# Patient Record
Sex: FEMALE | Race: WHITE | NOT HISPANIC OR LATINO | Employment: OTHER | URBAN - METROPOLITAN AREA
[De-identification: names, ages, dates, MRNs, and addresses within clinical notes are randomized per-mention and may not be internally consistent; named-entity substitution may affect disease eponyms.]

---

## 2017-01-02 ENCOUNTER — HOSPITAL ENCOUNTER (OUTPATIENT)
Dept: RADIOLOGY | Age: 72
Discharge: HOME | End: 2017-01-02
Payer: MEDICARE

## 2017-01-02 ENCOUNTER — TRANSCRIBE ORDERS (OUTPATIENT)
Dept: ADMINISTRATIVE | Age: 72
End: 2017-01-02

## 2017-01-02 DIAGNOSIS — J11.00 PNEUMONIA OF RIGHT MIDDLE LOBE DUE TO INFLUENZA A VIRUS: Primary | ICD-10-CM

## 2017-01-02 DIAGNOSIS — J11.00 PNEUMONIA OF RIGHT MIDDLE LOBE DUE TO INFLUENZA A VIRUS: ICD-10-CM

## 2017-01-02 PROCEDURE — 71020 HB CHEST X-RAY 2VW FRONTAL&LATL: CPT

## 2017-03-17 ENCOUNTER — ALLSCRIPTS OFFICE VISIT (OUTPATIENT)
Dept: OTHER | Facility: OTHER | Age: 72
End: 2017-03-17

## 2017-03-17 DIAGNOSIS — E04.1 NONTOXIC SINGLE THYROID NODULE: ICD-10-CM

## 2017-03-17 DIAGNOSIS — E78.5 HYPERLIPIDEMIA: ICD-10-CM

## 2017-03-17 DIAGNOSIS — E11.65 TYPE 2 DIABETES MELLITUS WITH HYPERGLYCEMIA (HCC): ICD-10-CM

## 2017-03-23 ENCOUNTER — TRANSCRIBE ORDERS (OUTPATIENT)
Dept: ADMINISTRATIVE | Facility: HOSPITAL | Age: 72
End: 2017-03-23

## 2017-03-23 DIAGNOSIS — D75.89 ERYTHRODYSPLASIA: Primary | ICD-10-CM

## 2017-03-24 ENCOUNTER — APPOINTMENT (OUTPATIENT)
Dept: LAB | Age: 72
End: 2017-03-24
Payer: MEDICARE

## 2017-03-24 ENCOUNTER — TRANSCRIBE ORDERS (OUTPATIENT)
Dept: ADMINISTRATIVE | Age: 72
End: 2017-03-24

## 2017-03-24 DIAGNOSIS — E11.65 TYPE 2 DIABETES MELLITUS WITH HYPERGLYCEMIA (HCC): ICD-10-CM

## 2017-03-24 DIAGNOSIS — E78.5 HYPERLIPIDEMIA: ICD-10-CM

## 2017-03-24 DIAGNOSIS — E04.1 NONTOXIC SINGLE THYROID NODULE: ICD-10-CM

## 2017-03-24 DIAGNOSIS — S42.302A CLOSED FRACTURE OF SHAFT OF LEFT HUMERUS, UNSPECIFIED FRACTURE MORPHOLOGY, INITIAL ENCOUNTER: ICD-10-CM

## 2017-03-24 DIAGNOSIS — S42.302A CLOSED FRACTURE OF SHAFT OF LEFT HUMERUS, UNSPECIFIED FRACTURE MORPHOLOGY, INITIAL ENCOUNTER: Primary | ICD-10-CM

## 2017-03-24 LAB
ALBUMIN SERPL BCP-MCNC: 3.8 G/DL (ref 3.5–5)
ALP SERPL-CCNC: 90 U/L (ref 46–116)
ALT SERPL W P-5'-P-CCNC: 25 U/L (ref 12–78)
ANION GAP SERPL CALCULATED.3IONS-SCNC: 10 MMOL/L (ref 4–13)
AST SERPL W P-5'-P-CCNC: 16 U/L (ref 5–45)
BASOPHILS # BLD AUTO: 0.03 THOUSANDS/ΜL (ref 0–0.1)
BASOPHILS NFR BLD AUTO: 0 % (ref 0–1)
BILIRUB SERPL-MCNC: 0.79 MG/DL (ref 0.2–1)
BUN SERPL-MCNC: 28 MG/DL (ref 5–25)
CALCIUM SERPL-MCNC: 9.5 MG/DL (ref 8.3–10.1)
CHLORIDE SERPL-SCNC: 103 MMOL/L (ref 100–108)
CHOLEST SERPL-MCNC: 136 MG/DL (ref 50–200)
CO2 SERPL-SCNC: 24 MMOL/L (ref 21–32)
CREAT SERPL-MCNC: 0.91 MG/DL (ref 0.6–1.3)
CREAT UR-MCNC: 34.9 MG/DL
EOSINOPHIL # BLD AUTO: 0.23 THOUSAND/ΜL (ref 0–0.61)
EOSINOPHIL NFR BLD AUTO: 3 % (ref 0–6)
ERYTHROCYTE [DISTWIDTH] IN BLOOD BY AUTOMATED COUNT: 13.9 % (ref 11.6–15.1)
EST. AVERAGE GLUCOSE BLD GHB EST-MCNC: 177 MG/DL
GFR SERPL CREATININE-BSD FRML MDRD: >60 ML/MIN/1.73SQ M
GLUCOSE P FAST SERPL-MCNC: 133 MG/DL (ref 65–99)
HBA1C MFR BLD: 7.8 % (ref 4.2–6.3)
HCT VFR BLD AUTO: 39.4 % (ref 34.8–46.1)
HDLC SERPL-MCNC: 46 MG/DL (ref 40–60)
HGB BLD-MCNC: 13 G/DL (ref 11.5–15.4)
LDLC SERPL CALC-MCNC: 39 MG/DL (ref 0–100)
LYMPHOCYTES # BLD AUTO: 2.37 THOUSANDS/ΜL (ref 0.6–4.47)
LYMPHOCYTES NFR BLD AUTO: 32 % (ref 14–44)
MCH RBC QN AUTO: 28.8 PG (ref 26.8–34.3)
MCHC RBC AUTO-ENTMCNC: 33 G/DL (ref 31.4–37.4)
MCV RBC AUTO: 87 FL (ref 82–98)
MICROALBUMIN UR-MCNC: 70.2 MG/L (ref 0–20)
MICROALBUMIN/CREAT 24H UR: 201 MG/G CREATININE (ref 0–30)
MONOCYTES # BLD AUTO: 0.63 THOUSAND/ΜL (ref 0.17–1.22)
MONOCYTES NFR BLD AUTO: 8 % (ref 4–12)
NEUTROPHILS # BLD AUTO: 4.21 THOUSANDS/ΜL (ref 1.85–7.62)
NEUTS SEG NFR BLD AUTO: 57 % (ref 43–75)
NRBC BLD AUTO-RTO: 0 /100 WBCS
PLATELET # BLD AUTO: 241 THOUSANDS/UL (ref 149–390)
PMV BLD AUTO: 10.3 FL (ref 8.9–12.7)
POTASSIUM SERPL-SCNC: 3.9 MMOL/L (ref 3.5–5.3)
PROT SERPL-MCNC: 7.9 G/DL (ref 6.4–8.2)
RBC # BLD AUTO: 4.51 MILLION/UL (ref 3.81–5.12)
SODIUM SERPL-SCNC: 137 MMOL/L (ref 136–145)
T4 FREE SERPL-MCNC: 1.07 NG/DL (ref 0.76–1.46)
TRIGL SERPL-MCNC: 253 MG/DL
TSH SERPL DL<=0.05 MIU/L-ACNC: 2.18 UIU/ML (ref 0.36–3.74)
WBC # BLD AUTO: 7.48 THOUSAND/UL (ref 4.31–10.16)

## 2017-03-24 PROCEDURE — 80053 COMPREHEN METABOLIC PANEL: CPT

## 2017-03-24 PROCEDURE — 84443 ASSAY THYROID STIM HORMONE: CPT

## 2017-03-24 PROCEDURE — 82570 ASSAY OF URINE CREATININE: CPT

## 2017-03-24 PROCEDURE — 80061 LIPID PANEL: CPT

## 2017-03-24 PROCEDURE — 85025 COMPLETE CBC W/AUTO DIFF WBC: CPT

## 2017-03-24 PROCEDURE — 83036 HEMOGLOBIN GLYCOSYLATED A1C: CPT

## 2017-03-24 PROCEDURE — 36415 COLL VENOUS BLD VENIPUNCTURE: CPT

## 2017-03-24 PROCEDURE — 84165 PROTEIN E-PHORESIS SERUM: CPT

## 2017-03-24 PROCEDURE — 84439 ASSAY OF FREE THYROXINE: CPT

## 2017-03-24 PROCEDURE — 82043 UR ALBUMIN QUANTITATIVE: CPT

## 2017-03-27 ENCOUNTER — GENERIC CONVERSION - ENCOUNTER (OUTPATIENT)
Dept: OTHER | Facility: OTHER | Age: 72
End: 2017-03-27

## 2017-03-28 LAB
ALBUMIN SERPL ELPH-MCNC: 4.06 G/DL (ref 3.5–5)
ALBUMIN SERPL ELPH-MCNC: 53.4 % (ref 52–65)
ALPHA1 GLOB SERPL ELPH-MCNC: 0.33 G/DL (ref 0.1–0.4)
ALPHA1 GLOB SERPL ELPH-MCNC: 4.3 % (ref 2.5–5)
ALPHA2 GLOB SERPL ELPH-MCNC: 1.08 G/DL (ref 0.4–1.2)
ALPHA2 GLOB SERPL ELPH-MCNC: 14.2 % (ref 7–13)
BETA GLOB ABNORMAL SERPL ELPH-MCNC: 0.51 G/DL (ref 0.4–0.8)
BETA1 GLOB SERPL ELPH-MCNC: 6.7 % (ref 5–13)
BETA2 GLOB SERPL ELPH-MCNC: 5.2 % (ref 2–8)
BETA2+GAMMA GLOB SERPL ELPH-MCNC: 0.4 G/DL (ref 0.2–0.5)
GAMMA GLOB ABNORMAL SERPL ELPH-MCNC: 1.23 G/DL (ref 0.5–1.6)
GAMMA GLOB SERPL ELPH-MCNC: 16.2 % (ref 12–22)
IGG/ALB SER: 1.15 {RATIO} (ref 1.1–1.8)
PROT PATTERN SERPL ELPH-IMP: ABNORMAL
PROT SERPL-MCNC: 7.6 G/DL (ref 6.4–8.2)

## 2017-04-03 ENCOUNTER — HOSPITAL ENCOUNTER (OUTPATIENT)
Dept: RADIOLOGY | Age: 72
Discharge: HOME | End: 2017-04-03
Payer: MEDICARE

## 2017-04-03 DIAGNOSIS — D75.89 ERYTHRODYSPLASIA: ICD-10-CM

## 2017-04-03 PROCEDURE — 78306 BONE IMAGING WHOLE BODY: CPT

## 2017-04-03 PROCEDURE — A9503 TC99M MEDRONATE: HCPCS

## 2017-06-26 ENCOUNTER — HOSPITAL ENCOUNTER (OUTPATIENT)
Dept: NON INVASIVE DIAGNOSTICS | Facility: HOSPITAL | Age: 72
Discharge: HOME | End: 2017-06-26
Payer: MEDICARE

## 2017-06-26 ENCOUNTER — TRANSCRIBE ORDERS (OUTPATIENT)
Dept: ADMINISTRATIVE | Facility: HOSPITAL | Age: 72
End: 2017-06-26

## 2017-06-26 DIAGNOSIS — R60.1 ANASARCA: ICD-10-CM

## 2017-06-26 DIAGNOSIS — I82.409 DEEP PHLEBOTHROMBOSIS, ANTEPARTUM, WITH DELIVERY (HCC): ICD-10-CM

## 2017-06-26 DIAGNOSIS — I82.409 DEEP PHLEBOTHROMBOSIS, ANTEPARTUM, WITH DELIVERY (HCC): Primary | ICD-10-CM

## 2017-06-26 PROCEDURE — 93970 EXTREMITY STUDY: CPT

## 2017-06-27 ENCOUNTER — TRANSCRIBE ORDERS (OUTPATIENT)
Dept: ADMINISTRATIVE | Age: 72
End: 2017-06-27

## 2017-06-27 ENCOUNTER — APPOINTMENT (OUTPATIENT)
Dept: LAB | Age: 72
End: 2017-06-27
Payer: MEDICARE

## 2017-06-27 DIAGNOSIS — I82.409 DEEP PHLEBOTHROMBOSIS, ANTEPARTUM, WITH DELIVERY (HCC): Primary | ICD-10-CM

## 2017-06-27 DIAGNOSIS — O22.30 DEEP PHLEBOTHROMBOSIS, ANTEPARTUM, WITH DELIVERY (HCC): Primary | ICD-10-CM

## 2017-06-27 DIAGNOSIS — R60.1 ANASARCA: ICD-10-CM

## 2017-06-27 DIAGNOSIS — I82.409 DEEP PHLEBOTHROMBOSIS, ANTEPARTUM, WITH DELIVERY (HCC): ICD-10-CM

## 2017-06-27 LAB
ALBUMIN SERPL BCP-MCNC: 3.8 G/DL (ref 3.5–5)
ALP SERPL-CCNC: 84 U/L (ref 46–116)
ALT SERPL W P-5'-P-CCNC: 29 U/L (ref 12–78)
ANION GAP SERPL CALCULATED.3IONS-SCNC: 7 MMOL/L (ref 4–13)
AST SERPL W P-5'-P-CCNC: 24 U/L (ref 5–45)
BACTERIA UR QL AUTO: ABNORMAL /HPF
BASOPHILS # BLD AUTO: 0.03 THOUSANDS/ΜL (ref 0–0.1)
BASOPHILS NFR BLD AUTO: 0 % (ref 0–1)
BILIRUB SERPL-MCNC: 0.88 MG/DL (ref 0.2–1)
BILIRUB UR QL STRIP: NEGATIVE
BUN SERPL-MCNC: 25 MG/DL (ref 5–25)
CALCIUM SERPL-MCNC: 9.9 MG/DL (ref 8.3–10.1)
CHLORIDE SERPL-SCNC: 104 MMOL/L (ref 100–108)
CLARITY UR: ABNORMAL
CO2 SERPL-SCNC: 27 MMOL/L (ref 21–32)
COLOR UR: YELLOW
CREAT SERPL-MCNC: 0.89 MG/DL (ref 0.6–1.3)
DEPRECATED D DIMER PPP: 520 NG/ML (FEU) (ref 0–424)
EOSINOPHIL # BLD AUTO: 0.21 THOUSAND/ΜL (ref 0–0.61)
EOSINOPHIL NFR BLD AUTO: 3 % (ref 0–6)
ERYTHROCYTE [DISTWIDTH] IN BLOOD BY AUTOMATED COUNT: 13.9 % (ref 11.6–15.1)
GFR SERPL CREATININE-BSD FRML MDRD: >60 ML/MIN/1.73SQ M
GLUCOSE P FAST SERPL-MCNC: 123 MG/DL (ref 65–99)
GLUCOSE UR STRIP-MCNC: NEGATIVE MG/DL
HCT VFR BLD AUTO: 38.8 % (ref 34.8–46.1)
HGB BLD-MCNC: 12.5 G/DL (ref 11.5–15.4)
HGB UR QL STRIP.AUTO: NEGATIVE
HYALINE CASTS #/AREA URNS LPF: ABNORMAL /LPF
KETONES UR STRIP-MCNC: NEGATIVE MG/DL
LEUKOCYTE ESTERASE UR QL STRIP: ABNORMAL
LYMPHOCYTES # BLD AUTO: 2.79 THOUSANDS/ΜL (ref 0.6–4.47)
LYMPHOCYTES NFR BLD AUTO: 37 % (ref 14–44)
MCH RBC QN AUTO: 28 PG (ref 26.8–34.3)
MCHC RBC AUTO-ENTMCNC: 32.2 G/DL (ref 31.4–37.4)
MCV RBC AUTO: 87 FL (ref 82–98)
MONOCYTES # BLD AUTO: 0.6 THOUSAND/ΜL (ref 0.17–1.22)
MONOCYTES NFR BLD AUTO: 8 % (ref 4–12)
NEUTROPHILS # BLD AUTO: 3.9 THOUSANDS/ΜL (ref 1.85–7.62)
NEUTS SEG NFR BLD AUTO: 52 % (ref 43–75)
NITRITE UR QL STRIP: NEGATIVE
NON-SQ EPI CELLS URNS QL MICRO: ABNORMAL /HPF
NRBC BLD AUTO-RTO: 0 /100 WBCS
PH UR STRIP.AUTO: 6 [PH] (ref 4.5–8)
PLATELET # BLD AUTO: 229 THOUSANDS/UL (ref 149–390)
PMV BLD AUTO: 10.9 FL (ref 8.9–12.7)
POTASSIUM SERPL-SCNC: 3.9 MMOL/L (ref 3.5–5.3)
PROT SERPL-MCNC: 7.9 G/DL (ref 6.4–8.2)
PROT UR STRIP-MCNC: ABNORMAL MG/DL
RBC # BLD AUTO: 4.46 MILLION/UL (ref 3.81–5.12)
RBC #/AREA URNS AUTO: ABNORMAL /HPF
SODIUM SERPL-SCNC: 138 MMOL/L (ref 136–145)
SP GR UR STRIP.AUTO: 1.02 (ref 1–1.03)
TSH SERPL DL<=0.05 MIU/L-ACNC: 1.3 UIU/ML (ref 0.36–3.74)
UROBILINOGEN UR QL STRIP.AUTO: 1 E.U./DL
WBC # BLD AUTO: 7.55 THOUSAND/UL (ref 4.31–10.16)
WBC #/AREA URNS AUTO: ABNORMAL /HPF

## 2017-06-27 PROCEDURE — 85025 COMPLETE CBC W/AUTO DIFF WBC: CPT

## 2017-06-27 PROCEDURE — 80053 COMPREHEN METABOLIC PANEL: CPT

## 2017-06-27 PROCEDURE — 85379 FIBRIN DEGRADATION QUANT: CPT

## 2017-06-27 PROCEDURE — 84443 ASSAY THYROID STIM HORMONE: CPT

## 2017-06-27 PROCEDURE — 36415 COLL VENOUS BLD VENIPUNCTURE: CPT

## 2017-06-27 PROCEDURE — 81001 URINALYSIS AUTO W/SCOPE: CPT | Performed by: INTERNAL MEDICINE

## 2017-06-30 ENCOUNTER — LAB REQUISITION (OUTPATIENT)
Dept: LAB | Facility: HOSPITAL | Age: 72
End: 2017-06-30
Payer: MEDICARE

## 2017-06-30 DIAGNOSIS — N39.0 URINARY TRACT INFECTION: ICD-10-CM

## 2017-06-30 LAB
BILIRUB UR QL STRIP: NEGATIVE
CLARITY UR: CLEAR
COLOR UR: YELLOW
GLUCOSE UR STRIP-MCNC: NEGATIVE MG/DL
HGB UR QL STRIP.AUTO: NEGATIVE
KETONES UR STRIP-MCNC: NEGATIVE MG/DL
LEUKOCYTE ESTERASE UR QL STRIP: NEGATIVE
NITRITE UR QL STRIP: NEGATIVE
PH UR STRIP.AUTO: 6 [PH] (ref 4.5–8)
PROT UR STRIP-MCNC: NEGATIVE MG/DL
SP GR UR STRIP.AUTO: 1.02 (ref 1–1.03)
UROBILINOGEN UR QL STRIP.AUTO: 1 E.U./DL

## 2017-06-30 PROCEDURE — 81003 URINALYSIS AUTO W/O SCOPE: CPT | Performed by: INTERNAL MEDICINE

## 2017-06-30 PROCEDURE — 87086 URINE CULTURE/COLONY COUNT: CPT | Performed by: INTERNAL MEDICINE

## 2017-07-01 ENCOUNTER — APPOINTMENT (OUTPATIENT)
Dept: LAB | Age: 72
End: 2017-07-01
Payer: MEDICARE

## 2017-07-01 DIAGNOSIS — R60.1 ANASARCA: ICD-10-CM

## 2017-07-01 DIAGNOSIS — O22.30 DEEP PHLEBOTHROMBOSIS, ANTEPARTUM, WITH DELIVERY (HCC): ICD-10-CM

## 2017-07-01 DIAGNOSIS — I82.409 DEEP PHLEBOTHROMBOSIS, ANTEPARTUM, WITH DELIVERY (HCC): ICD-10-CM

## 2017-07-01 PROCEDURE — 85732 THROMBOPLASTIN TIME PARTIAL: CPT

## 2017-07-01 PROCEDURE — 85670 THROMBIN TIME PLASMA: CPT

## 2017-07-01 PROCEDURE — 85705 THROMBOPLASTIN INHIBITION: CPT

## 2017-07-01 PROCEDURE — 81241 F5 GENE: CPT

## 2017-07-01 PROCEDURE — 85303 CLOT INHIBIT PROT C ACTIVITY: CPT

## 2017-07-01 PROCEDURE — 36415 COLL VENOUS BLD VENIPUNCTURE: CPT

## 2017-07-01 PROCEDURE — 85613 RUSSELL VIPER VENOM DILUTED: CPT

## 2017-07-01 PROCEDURE — 85306 CLOT INHIBIT PROT S FREE: CPT

## 2017-07-02 LAB — BACTERIA UR CULT: NORMAL

## 2017-07-03 ENCOUNTER — HOSPITAL ENCOUNTER (OUTPATIENT)
Dept: RADIOLOGY | Age: 72
Discharge: HOME | End: 2017-07-03
Payer: MEDICARE

## 2017-07-03 DIAGNOSIS — I82.409: ICD-10-CM

## 2017-07-03 DIAGNOSIS — O22.30 DEEP PHLEBOTHROMBOSIS, ANTEPARTUM, WITH DELIVERY (HCC): ICD-10-CM

## 2017-07-03 DIAGNOSIS — I82.409 DEEP PHLEBOTHROMBOSIS, ANTEPARTUM, WITH DELIVERY (HCC): ICD-10-CM

## 2017-07-03 LAB — PROT C AG ACT/NOR PPP IA: 97 % OF NORMAL (ref 60–150)

## 2017-07-03 PROCEDURE — 76856 US EXAM PELVIC COMPLETE: CPT

## 2017-07-03 PROCEDURE — 76830 TRANSVAGINAL US NON-OB: CPT

## 2017-07-05 LAB — PROT S ACT/NOR PPP: 185 % (ref 63–140)

## 2017-07-06 LAB
APTT SCREEN TO CONFIRM RATIO: 0.84 RATIO (ref 0–1.4)
APTT-LA IMM 4:1 NP PPP: 46.4 SEC (ref 0–48.9)
CONFIRM APTT/NORMAL: 56.5 SEC (ref 0–55)
DRVVT IMM 1:2 NP PPP: 60.9 SEC (ref 0–47)
DRVVT SCREEN TO CONFIRM RATIO: 1.1 RATIO (ref 0.8–1.2)
LA PPP-IMP: ABNORMAL
SCREEN APTT: 55.6 SEC (ref 0–51.9)
SCREEN DRVVT: 92.4 SEC (ref 0–47)
THROMBIN TIME: 18.1 SEC (ref 0–20.9)

## 2017-07-08 ENCOUNTER — HOSPITAL ENCOUNTER (EMERGENCY)
Facility: HOSPITAL | Age: 72
Discharge: HOME | End: 2017-07-08
Attending: EMERGENCY MEDICINE
Payer: MEDICARE

## 2017-07-08 VITALS
BODY MASS INDEX: 33.04 KG/M2 | TEMPERATURE: 98.3 F | HEIGHT: 61 IN | RESPIRATION RATE: 18 BRPM | SYSTOLIC BLOOD PRESSURE: 166 MMHG | DIASTOLIC BLOOD PRESSURE: 77 MMHG | HEART RATE: 72 BPM | WEIGHT: 175 LBS | OXYGEN SATURATION: 96 %

## 2017-07-08 DIAGNOSIS — R31.0 GROSS HEMATURIA: Primary | ICD-10-CM

## 2017-07-08 LAB
BACTERIA UR QL AUTO: ABNORMAL /HPF
BILIRUB UR QL STRIP: NEGATIVE
CLARITY UR: ABNORMAL
COLOR UR: ABNORMAL
COMMENT: ABNORMAL
F5 GENE MUT ANL BLD/T: ABNORMAL
GLUCOSE UR STRIP-MCNC: ABNORMAL MG/DL
HGB UR QL STRIP.AUTO: ABNORMAL
KETONES UR STRIP-MCNC: NEGATIVE MG/DL
LEUKOCYTE ESTERASE UR QL STRIP: ABNORMAL
NITRITE UR QL STRIP: NEGATIVE
NON-SQ EPI CELLS URNS QL MICRO: ABNORMAL /HPF
PH UR STRIP.AUTO: 6 [PH] (ref 4.5–8)
PROT UR STRIP-MCNC: ABNORMAL MG/DL
RBC #/AREA URNS AUTO: ABNORMAL /HPF
SP GR UR STRIP.AUTO: 1.02 (ref 1–1.03)
UROBILINOGEN UR QL STRIP.AUTO: 1 E.U./DL
WBC #/AREA URNS AUTO: ABNORMAL /HPF

## 2017-07-08 PROCEDURE — 81001 URINALYSIS AUTO W/SCOPE: CPT | Performed by: EMERGENCY MEDICINE

## 2017-07-08 PROCEDURE — 99283 EMERGENCY DEPT VISIT LOW MDM: CPT

## 2017-07-08 PROCEDURE — 87086 URINE CULTURE/COLONY COUNT: CPT | Performed by: EMERGENCY MEDICINE

## 2017-07-08 RX ORDER — LOSARTAN POTASSIUM AND HYDROCHLOROTHIAZIDE 12.5; 1 MG/1; MG/1
TABLET ORAL
COMMUNITY
End: 2022-03-25 | Stop reason: SDUPTHER

## 2017-07-08 RX ORDER — ATORVASTATIN CALCIUM 20 MG/1
TABLET, FILM COATED ORAL
COMMUNITY

## 2017-07-08 RX ORDER — PROPRANOLOL HYDROCHLORIDE 20 MG/1
20 TABLET ORAL
COMMUNITY

## 2017-07-08 RX ORDER — GLIMEPIRIDE 2 MG/1
2 TABLET ORAL 2 TIMES DAILY
COMMUNITY
Start: 2014-12-15 | End: 2020-02-10 | Stop reason: SDUPTHER

## 2017-07-09 LAB — BACTERIA UR CULT: NORMAL

## 2017-09-11 ENCOUNTER — TRANSCRIBE ORDERS (OUTPATIENT)
Dept: ADMINISTRATIVE | Age: 72
End: 2017-09-11

## 2017-09-11 ENCOUNTER — APPOINTMENT (OUTPATIENT)
Dept: LAB | Age: 72
End: 2017-09-11
Payer: MEDICARE

## 2017-09-11 DIAGNOSIS — N39.0 URINARY TRACT INFECTION, SITE NOT SPECIFIED: ICD-10-CM

## 2017-09-11 DIAGNOSIS — N39.0 URINARY TRACT INFECTION, SITE NOT SPECIFIED: Primary | ICD-10-CM

## 2017-09-11 LAB
BACTERIA UR QL AUTO: ABNORMAL /HPF
BILIRUB UR QL STRIP: NEGATIVE
CLARITY UR: CLEAR
COLOR UR: YELLOW
GLUCOSE UR STRIP-MCNC: ABNORMAL MG/DL
HGB UR QL STRIP.AUTO: NEGATIVE
KETONES UR STRIP-MCNC: NEGATIVE MG/DL
LEUKOCYTE ESTERASE UR QL STRIP: ABNORMAL
NITRITE UR QL STRIP: NEGATIVE
NON-SQ EPI CELLS URNS QL MICRO: ABNORMAL /HPF
PH UR STRIP.AUTO: 6 [PH] (ref 4.5–8)
PROT UR STRIP-MCNC: ABNORMAL MG/DL
RBC #/AREA URNS AUTO: ABNORMAL /HPF
SP GR UR STRIP.AUTO: 1.02 (ref 1–1.03)
UROBILINOGEN UR QL STRIP.AUTO: 0.2 E.U./DL
WBC #/AREA URNS AUTO: ABNORMAL /HPF

## 2017-09-11 PROCEDURE — 81001 URINALYSIS AUTO W/SCOPE: CPT | Performed by: INTERNAL MEDICINE

## 2017-09-11 PROCEDURE — 87086 URINE CULTURE/COLONY COUNT: CPT

## 2017-09-12 LAB — BACTERIA UR CULT: NORMAL

## 2017-11-01 ENCOUNTER — TRANSCRIBE ORDERS (OUTPATIENT)
Dept: ADMINISTRATIVE | Age: 72
End: 2017-11-01

## 2017-11-01 ENCOUNTER — APPOINTMENT (OUTPATIENT)
Dept: LAB | Age: 72
End: 2017-11-01
Payer: MEDICARE

## 2017-11-01 DIAGNOSIS — E11.8 TYPE 2 DIABETES MELLITUS WITH COMPLICATION, WITHOUT LONG-TERM CURRENT USE OF INSULIN (HCC): ICD-10-CM

## 2017-11-01 DIAGNOSIS — E11.8 TYPE 2 DIABETES MELLITUS WITH COMPLICATION, WITHOUT LONG-TERM CURRENT USE OF INSULIN (HCC): Primary | ICD-10-CM

## 2017-11-01 LAB
ANION GAP SERPL CALCULATED.3IONS-SCNC: 7 MMOL/L (ref 4–13)
BUN SERPL-MCNC: 23 MG/DL (ref 5–25)
CALCIUM SERPL-MCNC: 10 MG/DL (ref 8.3–10.1)
CHLORIDE SERPL-SCNC: 103 MMOL/L (ref 100–108)
CO2 SERPL-SCNC: 26 MMOL/L (ref 21–32)
CREAT SERPL-MCNC: 0.81 MG/DL (ref 0.6–1.3)
EST. AVERAGE GLUCOSE BLD GHB EST-MCNC: 186 MG/DL
GFR SERPL CREATININE-BSD FRML MDRD: 73 ML/MIN/1.73SQ M
GLUCOSE P FAST SERPL-MCNC: 146 MG/DL (ref 65–99)
HBA1C MFR BLD: 8.1 % (ref 4.2–6.3)
POTASSIUM SERPL-SCNC: 4.4 MMOL/L (ref 3.5–5.3)
SODIUM SERPL-SCNC: 136 MMOL/L (ref 136–145)
TRIGL SERPL-MCNC: 307 MG/DL

## 2017-11-01 PROCEDURE — 84478 ASSAY OF TRIGLYCERIDES: CPT

## 2017-11-01 PROCEDURE — 36415 COLL VENOUS BLD VENIPUNCTURE: CPT

## 2017-11-01 PROCEDURE — 80048 BASIC METABOLIC PNL TOTAL CA: CPT

## 2017-11-01 PROCEDURE — 83036 HEMOGLOBIN GLYCOSYLATED A1C: CPT

## 2017-12-27 ENCOUNTER — ALLSCRIPTS OFFICE VISIT (OUTPATIENT)
Dept: OTHER | Facility: OTHER | Age: 72
End: 2017-12-27

## 2017-12-28 NOTE — PROGRESS NOTES
Assessment   1  Acute deep vein thrombosis of peroneal vein, bilateral (453 42) (I49 137)   2  Never a smoker    Plan   Acute deep vein thrombosis of peroneal vein, bilateral    · Follow-up PRN Evaluation and Treatment  Follow-up  Status: Complete  Done:    [id number]   Ordered; For: Acute deep vein thrombosis of peroneal vein, bilateral; Ordered By: Marcia Muniz Performed:  Due: 49QHK7382   · *1 - [facility] Co-Management  B DVT 6/17; review hypercoag    testing; discuss length of treatment; patient wants second opinion  Status: Active     Requested for: 06Sgm6728   Ordered; For: Acute deep vein thrombosis of peroneal vein, bilateral; Ordered By: Marcia Muniz Performed:  Due: 07CIP0253; Last Updated By: Ricky Mercado; 12/27/2017 9:39:36 AM  Care Summary provided  : Yes    Discussion/Summary   Discussion Summary:    Patient is a 70 yo F w/ HTN, HLD, DM, found to have acute B peroneal DVT in 6/17 B peroneal DVT venous DU which shows B peroneal acute DVT from 6/17 has been maintained on Eliquis without issue, planned for lifelong treatment by PCP; patient wants 2nd opinion to ascertain if this is first or second episode (some other epidose of thrombus in her 46s but doesn't know if DVT or SVT) or if provoked or unprovoked (did have trip to [place] but can't remember if this was before or after the swellign started  Also had some hypercoag testing but this was done while on NOAC  Did show heterozygous Factor V though  refer to hematology to discuss these issues further and make decision about length of anticoagulation role for surgical intervention at this time to wear compression stockings daily legs whenever possible and stay active PRN  Counseling Documentation With Imm: The patient, patient's family was counseled regarding diagnostic results,-- prognosis,-- patient and family education,-- risks and benefits of treatment options        Chief Complaint   Chief Complaint Free Text Note Form: I'm a new patient and I'm here to check legs for blood clots is new to our practice, self referred  Pt has HX of DVT  Pt had LEV on 06/26/17  Pt states by end of day she has a little swelling on bilateral legs  Pt denies pain in leg  Pt denies open wounds or sores  Pt is on eliquis daily managed by PCP Dr Gay Patel  History of Present Illness   HPI: Patient is a 72 yo F w/ HTN, HLD, DM, found to have acute B peroneal DVT in 6/17  Patient and  are very poor historians  notes hx of some sort of blood clot to posterior L knee but doesn't know if this was SVT or DVT  She doesn't know when this occurred but thinks it might have been in her 46s  She knows none of the circumstances  this most recent DVT, patient noted BLE ankle edema around 4/17  She didn't seek care until around 6/17 when she went for DU and was diagnosed with DVT  She denies any surgery or hospitalization around the time the symptoms started  She took a plane trip to [place] but cant' remember if this was before or after sxs started or even what month the trip was in  She was started on Eliquis and continues this  She also appears to have had hypercoagulable testing done in 7/17 but she was taking Eliquis at the time  Review of Systems   Complete Female - Vasc:      Constitutional: No fever or chills, feels well, no tiredness, no recent weight gain or weight loss  Eyes: No sudden vision loss, no blurred vision, no double vision  ENT: hearing loss, but-- no earache,-- no nosebleeds,-- no sore throat,-- no nasal discharge-- and-- no hoarseness  Cardiovascular: no leg pain with walking, but-- no chest pain, regular heart rate-- and-- no intermittent leg claudication  Respiratory: No sob, no wheezing, no cough, no sob with exertion, no orthopnea  Gastrointestinal: No nausea, No vomiting, no diarrhea, no blood in stool  Genitourinary: no dysuria, no Hematuria,no urinary incontinence        Musculoskeletal: no lumbar pain,-- joint swelling,-- limb swelling-- and-- L shoulder pain, but-- no myalgias-- and-- no joint stiffness  Integumentary: no ulcers, but-- no rash, no lesions, no wounds, no ulcer  Neurological: no dementia, no headache, no numbness, no limb weakness, no dizziness, no difficulty walking-- and-- no difficulty walking  Psychiatric: no depression, no mood disorders, no anxiety  Hematologic/Lymphatic: a tendency for easy bruising  ROS Reviewed:    ROS reviewed  Active Problems   1  Benign essential hypertension (401 1) (I10)   2  Diabetes mellitus (250 00) (E11 9)   3  Hyperlipidemia (272 4) (E78 5)   4  Left ear pain (388 70) (H92 02)   5  Microalbuminuria (791 0) (R80 9)   6  Swelling of face (784 2) (R22 0)   7  Thyroid nodule (241 0) (E04 1)   8  Type 2 diabetes mellitus with hyperglycemia, with long-term current use of insulin     (250 00,790 29,V58 67) (E11 65,Z79  4)    Past Medical History   1  History of Blood clot in vein (453 9) (I82 90)   2  History of Kidney stones (592 0) (N20 0)  Active Problems And Past Medical History Reviewed: The active problems and past medical history were reviewed and updated today  Surgical History   1  History of Hand Surgery   2  History of Hysterectomy  Surgical History Reviewed: The surgical history was reviewed and updated today  Family History   Mother    1  Family history of Cancer   2  Family history of   Father    3  Family history of Cancer   4  Family history of   Sister    11  Family history of   Family History Reviewed: The family history was reviewed and updated today  Social History    · Never a smoker   · No alcohol use   · No drug use  Social History Reviewed: The social history was reviewed and updated today  The social history was reviewed and is unchanged  Current Meds    1  Atorvastatin Calcium 20 MG Oral Tablet; Therapy: (Recorded:2014) to Recorded   2   Eliquis 5 MG Oral Tablet; Therapy: (Recorded:49Scx4786) to Recorded   3  Glimepiride 2 MG Oral Tablet; 1 tablet twice per day breakfast and supper; Therapy: 83TUR0506 to (Evaluate:24Scg1966)  Requested for: 23Mar2017; Last     Rx:23Mar2017 Ordered   4  HumaLOG KwikPen 100 UNIT/ML Subcutaneous Solution Pen-injector; 18 units before     largest meal;     Therapy: 21Jun2016 to (Evaluate:99Qbt6135); Last Rx:05Jan2017 Ordered   5  Lantus SoloStar 100 UNIT/ML Subcutaneous Solution Pen-injector; Inject 40 units at     night  Requested for: 83UNO7213; Last Rx:04Adg2829 Ordered   6  Latanoprost 0 005 % Ophthalmic Solution; Therapy: (Recorded:83Pgz9804) to Recorded   7  Losartan Potassium-HCTZ 100-12 5 MG Oral Tablet; Therapy: (Recorded:06Nov2014) to Recorded   8  MetFORMIN HCl - 1000 MG Oral Tablet; TAKE 1 TABLET TWICE DAILY -TAKE WITH A     MEAL OR FOOD; Last Rx:17Mar2017 Ordered   9  Propranolol HCl - 20 MG Oral Tablet; Therapy: (Recorded:06Nov2014) to Recorded   10  Vitamin D 2000 UNIT Oral Capsule; 1 tablet daily; Last Rx:73Wym6053 Ordered  Medication List Reviewed: The medication list was reviewed and updated today  Allergies   1  Sulfa Drugs    Vitals   Vital Signs    Recorded: 76GOF6793 09:00AM   Temperature 97 6 F, Tympanic   Heart Rate 76, R Radial   Pulse Quality Normal, R Radial   Respiration Quality Normal   Respiration 16   Systolic 164, RUE, Sitting   Diastolic 78, RUE, Sitting   Height 5 ft 1 in   Weight 179 lb    BMI Calculated 33 82   BSA Calculated 1 8     Physical Exam        Carotid: no bruit heard on the right-- and-- no bruit on the left  Radial: right 2+-- and-- left 2+  Popliteal: right 1+-- and-- left 1+  Posterior tibialis: right 2+-- and-- left 2+  Dorsalis pedis: right 2+-- and-- left 2+  Distal Pulse Exam: Normal Capillary Refill           Extremities: bilateral ankle 2+ pitting edema,-- bilateral pretibial 1+ pitting edema-- and-- bilateral foot 2+ pitting edema       LE Varicose Veins: no right leg truncal veins,-- no left leg truncal veins,-- left leg reticular veins,-- right leg spider veins-- and-- left leg spider veins  mild spiders; some retics L medial knee The heart rate was normal  The rhythm was regular  Heart sounds: normal S1-- and-- normal S2       Murmurs: No murmurs were heard  Pulmonary      Respiratory effort: No increased work of breathing or signs of respiratory distress  Auscultation of lungs: Clear to auscultation  No wheezing, no rales, no rhonchi  Abdomen      Abdomen: Abdomen soft, non-tender, no masses, non distended, no rebound tenderness  Psychiatric      Orientation to person, place and time: Normal -- oriented but poor memory  Mood and affect: Normal       Eyes      Conjunctiva and lids: No swelling, erythema, or discharge  Ears, Nose, Mouth, and Throat      Hearing: Normal       Neck      Neck: No jugular venous distention, normal ROM and extension  No cervical adenopathy, trachea midline, neck supple  Neurologic      Motor exam abnormal-- tremor of head and BUE  Musculoskeletal      Gait and station: Normal       Skin      Skin and subcutaneous tissue: Normal without rashes or lesions  Future Appointments      Date/Time Provider Specialty Site   01/12/2018 11:00 AM ORLY Mendenhall   [facility] [address] [place] [address]     Signatures    Electronically signed by : Js Zacarias MD; Dec 27 2017 10:57AM EST                       (Author)

## 2018-01-09 NOTE — PROGRESS NOTES
Plan    1  DSMT/MNT Time Record; Status:Complete;   Done: 78PGS1168 01:03PM    Discussion/Summary    PATIENT EDUCATION RECORD   Healthy Eating: Response: Her current weight is 175 5  Discussed Behavior chains/behavior modification  Method: Instruction and Handout  Response: Verbalizes Understanding   Being Active:   Stated the benefits of exercise: Method: Instruction  Response: Verbalizes Understanding She was given the following educational materials:  Behavior chain handouts  Chief Complaint  Patient with T2DM seen for diet follow-up visit  History of Present Illness  Weight at today's visit was 175 5 pounds  Weight up one pound since her last visit in May  Patient states, "I was on vacation in [place] for the past 9 days  I didn't keep track of what I was eating  I am sure my weight went up"  Unable to assess intake as patient has not been keeping food records  Patient feels that eating breakfast stimulates her to start snacking and admits that between meal snacking continues to be her main problem  Sundar London is aware she spends much time around the kitchen and even reads standing up at the kitchen counter  She states, "I am always by the kitchen and so it is always easy to pick at something to eat  My best days are when the kids are over  They keep me busy"  Discussed the concept of a behavior chain and suggested patient think about what the chain of events are that lead to snacking  Recommended she find was to break the cycle of the chain so the same event of snacking doesn't continue to take place  Sundar London appeared to understand what was presented in the visit and agreed to bring her behavior chains with her to her next visit  Patient has not been exercising  She would like to get back to walking on her treadmill and going to the community [place]  She feels she has been lazy and unable to get the motivation to leave the house to do anything  Patient is scheduled for follow-up on 10/4/2016   RD will remain available for further dietary questions/concerns  Medical Nutrition Therapy Intervention: Exercise Guidelines and Behavior modification strategies  Her comprehension was good   Her motivation was good   Her compliance was good   Goals:  1  Complete behavior chains  2  Initiate aerobic exercise        Results/Data  DSMT/MNT Time Record [id number] 01:03PM Tate Auguste     Test Name Result Flag Reference   Date of Service 7/18/2016     Start - Stop Time 8:30-9:00AM     Total MInutes 30 minutes     Group Or Individual Instruction MNT-I       Future Appointments    Date/Time Provider Specialty Site   10/04/2016 09:00 AM Tate Auguste RD, LDN Diabetes Educator [facility] ENDOCRINOLOGY   08/31/2016 08:30 AM Willam Carrasco [facility] ENDOCRINOLOGY     Signatures   Electronically signed by : Phyllis Vargas [facility]; Jul 18 2016  4:55PM EST                       (Author)    Electronically signed by : Beryle Cross, M D ; Jul 19 2016  7:40AM EST

## 2018-01-09 NOTE — PROGRESS NOTES
Plan    1  DSMT/MNT Time Record; Status:Complete;   Done: 24TEX5915 02:11PM    Discussion/Summary    PATIENT EDUCATION RECORD   Indication for Services: type 2 Diabetes Mellitus  She is ready to learn  She has no barriers to learning  Healthy Eating:   Provided food diary and instructions on use: Method: Instruction and HandoutResponse: Verbalizes UnderstandingResponse: Her current weight is 174 5  Her keal needs are ~1400 calories  Her CHO's per meal are 30-45 grams  Discussed Stop LIght Strategy and behavior modification  Method: Instruction and Handout  Response: Verbalizes Understanding   Being Active:   Stated the benefits of exercise: Method: Instruction  Response: Verbalizes Understanding   Discussed Initiate exercise  Method:  She was given the following educational materials:  Stop Light Strategy  Chief Complaint  Patient with T2DM seen for annual MNT follow-up visit per MD request       History of Present Illness  Weight at today's visit was 174 5 pounds  Patient continues to struggle with "picking/snacking all day"  She admits some eating may be out of boredom  [facility] states, "I tend to eat breakfast and afterwards I go on my computer for about an hour  Then, I go back to the kitchen to clean up from breakfast and I start snacking on whatever is around"  Discussed the possibility she is eating out of habit and encouraged her to change the order she is doing things (i e  clean the kitchen right after breakfast and then go on the computer) to help break the habit  If she continues to struggle with her snacking habit, advised her to focus on making healthier food choices when she does snack (i e  snack on veggies instead if crackers or cookies)  Provided instruction on the Stop Light Strategy for making healthy food choices  Patient seemed receptive to the suggestions made and agreed to try them  [facility] has not been exercising despite joining the [facility]   Encouraged her to initiate aerobic exercise and work her way up to 150 minutes a week  Marco Hansen agreed to keep daily food logs  She is scheduled to return for follow-up on 7/18/2016  RD will remain available for further dietary questions/concerns  This is her follow-up assessment   Present at session: patient    Medical Diagnosis/Reason for Referral:T2DM  She has no special learning needs  Her caloric needs are ~1400 calories  She has had no recent weight change  Patient  shops for food  Patient  cooks the food  Exercise routine:  Patient does not exercise beyond her daily activities  She has not exercised since having pneumonia in January  She eats breakfast at  7:30-8:00 AM 1 cup Rice Krispies, 1/2 cup skim milk, occasionally 1/2 a small banana   She snacks at 9:00AM AM When cleaning up after breakfast: 1 slice provolone cheese or pepperoni, or 1 cookie (may do this 2 times in a morning)   She eats lunch at  12-1:30PM PM Ham or turkey (and, occasionally, 1 slice of salami) and 1 slice of American cheese with lettuce and tomato on 2 slices of wheat bread, may have 1/3 cup of berries or grapes, diet beverage; other lunch choices may be egg or tuna salad made with mayonnaise   She snacks at 2:30 PM Same as the morning snack   She eats dinner at  3:30-4:00PM PM 1 cup cereal (Cheerios or Rice Krispies), 1/2 cup skim milk   She snacks at 6:30 PM [address] with : chicken fingers, 3/4 cup rice, spinach     NUTRITION DIAGNOSES   Overweight Obesity   Overweight obesity related to: Not ready for diet/lifestyle change  As evidenced by: Tawanna  Not able to make suggested lifestyle changes  Medical Nutrition Therapy Intervention: Behavior modification strategies and Stop Light Strategy   Her comprehension was good   Her motivation was good   Her compliance was good   Goals:  1  Consume dinner at ~5:00 PM and have a small snack with  at 6:30-7:00 PM   2  Make healthier snack choices    3  Work on behavior modification to help changes habits  4  Initiate aerobic exercise        Vitals  Signs [Data Includes: Current Encounter]   Recorded: [id number] 02:12PM   Weight: 174 lb 8 oz  BMI Calculated: 32 97  BSA Calculated: 1 78    Results/Data  Encounter Results   DSMT/MNT Time Record [id number] 02:11PM Mirtha Fuchs     Test Name Result Flag Reference   Date of Service 5/23/2016     Start - Stop Time 8:30-9:00AM     Total MInutes 30 minutes     Group Or Individual Instruction MNT-I       Future Appointments    Date/Time Provider Specialty Site   07/18/2016 09:00 AM Mirtha Fuchs RD, LDN Diabetes Educator [facility] ENDOCRINOLOGY   08/31/2016 08:30 AM Rober Coleman [facility] Endocrinology [facility] ENDOCRINOLOGY     Signatures   Electronically signed by : Mackenzie Carpenter; May 24 2016  1:54PM EST                       (Author)    Electronically signed by : ORLY Carey ; May 24 2016  9:12PM EST                       (Author)

## 2018-01-11 NOTE — RESULT NOTES
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  Continue with losartan which helps protect kidneys  CHolesterol ok but triglycerides high-- work on dietary improvements and weight loss  Thyroid function OK  Verified Results  (1) HEMOGLOBIN A1C 13Oct2016 09:18AM [facility] Order Number: [id number]     Test Name Result Flag Reference   HEMOGLOBIN A1C 7 5 % H 4 2-6 3   EST  AVG  GLUCOSE 169 mg/dl       (1) MICROALBUMIN CREATININE RATIO, RANDOM URINE 13Oct2016 09:18AM [facility] Order Number: [id number]     Test Name Result Flag Reference   MICROALBUMIN/ CREAT R 60 mg/g creatinine H 0-30   MICROALBUMIN,URINE 68 9 mg/L H 0 0-20 0   CREATININE URINE 115 0 mg/dL       (1) LIPID PANEL, FASTING 13Oct2016 09:18AM [facility] Order Number: [id number]     Test Name Result Flag Reference   CHOLESTEROL 125 mg/dL     HDL,DIRECT 39 mg/dL L 40-60   Specimen collection should occur prior to Metamizole administration due to the potential for falsely depressed results  LDL CHOLESTEROL CALCULATED 34 mg/dL  0-100   - Patient Instructions: This is a fasting blood test  Water,black tea or black  coffee only after 9:00pm the night before test   Drink 2 glasses of water the morning of test     - Patient Instructions: This is a fasting blood test  Water,black tea or black  coffee only after 9:00pm the night before test Drink 2 glasses of water the morning of test - Patient Instructions: This bloodwork is non-fasting  Please drink two glasses of   water morning of bloodwork    Triglyceride:         Normal              <150 mg/dl       Borderline High    150-199 mg/dl       High               200-499 mg/dl       Very High          >499 mg/dl  Cholesterol: Desirable        <200 mg/dl      Borderline High  200-239 mg/dl      High             >239 mg/dl  HDL Cholesterol:        High    >59 mg/dL      Low     <41 mg/dL  LDL CALCULATED:    This screening LDL is a calculated result  It does not have the accuracy of the Direct Measured LDL in the monitoring of patients with hyperlipidemia and/or statin therapy  Direct Measure LDL (VOH035) must be ordered separately in these patients  TRIGLYCERIDES 262 mg/dL H <=150   Specimen collection should occur prior to N-Acetylcysteine or Metamizole administration due to the potential for falsely depressed results  (1) TSH 13Oct2016 09:18AM Fernanda Blackburn Order Number: [id number]     Test Name Result Flag Reference   TSH 1 370 uIU/mL  0 358-3 740   - Patient Instructions: This bloodwork is non-fasting  Please drink two glasses of water morning of bloodwork  - Patient Instructions: This is a fasting blood test  Water,black tea or black  coffee only after 9:00pm the night before test Drink 2 glasses of water the morning of test - Patient Instructions: This bloodwork is non-fasting  Please drink two glasses of   water morning of bloodwork  Patients undergoing fluorescein dye angiography may retain small amounts of fluorescein in the body for 48-72 hours post procedure  Samples containing fluorescein can produce falsely depressed TSH values  If the patient had this procedure,a specimen should be resubmitted post fluorescein clearance  The recommended reference ranges for TSH during pregnancy are as follows:  First trimester 0 1 to 2 5 uIU/mL  Second trimester  0 2 to 3 0 uIU/mL  Third trimester 0 3 to 3 0 uIU/m     (1) T4, FREE 13Oct2016 09:18AM Fernanda Blackburn Order Number: [id number]     Test Name Result Flag Reference   T4,FREE 1 02 ng/dL  0 76-1 46   - Patient Instructions:  This is a fasting blood test  Water,black tea or black  coffee only after 9:00pm the night before test Drink 2 glasses of water the morning of test - Patient Instructions: This bloodwork is non-fasting  Please drink two glasses of   water morning of bloodwork  (1) COMPREHENSIVE METABOLIC PANEL 26UAR9407 85:83YO Chantell Vences Order Number: [id number]     Test Name Result Flag Reference   GLUCOSE,RANDM 92 mg/dL     If the patient is fasting, the ADA then defines impaired fasting glucose as > 100 mg/dL and diabetes as > or equal to 123 mg/dL  SODIUM 137 mmol/L  136-145   POTASSIUM 3 9 mmol/L  3 5-5 3   CHLORIDE 104 mmol/L  100-108   CARBON DIOXIDE 25 mmol/L  21-32   ANION GAP (CALC) 8 mmol/L  4-13   BLOOD UREA NITROGEN 24 mg/dL  5-25   CREATININE 0 87 mg/dL  0 60-1 30   Standardized to IDMS reference method   CALCIUM 9 2 mg/dL  8 3-10 1   BILI, TOTAL 0 87 mg/dL  0 20-1 00   ALK PHOSPHATAS 81 U/L     ALT (SGPT) 22 U/L  12-78   AST(SGOT) 17 U/L  5-45   ALBUMIN 3 7 g/dL  3 5-5 0   TOTAL PROTEIN 7 4 g/dL  6 4-8 2   eGFR Non-African American      >60 0 ml/min/1 73sq m   - Patient Instructions: This is a fasting blood test  Water,black tea or black  coffee only after 9:00pm the night before test Drink 2 glasses of water the morning of test - Patient Instructions: This bloodwork is non-fasting  Please drink two glasses of   water morning of bloodwork  National Kidney Disease Education Program recommendations are as follows:  GFR calculation is accurate only with a steady state creatinine  Chronic Kidney disease less than 60 ml/min/1 73 sq  meters  Kidney failure less than 15 ml/min/1 73 sq  meters

## 2018-01-14 VITALS
DIASTOLIC BLOOD PRESSURE: 78 MMHG | WEIGHT: 179 LBS | HEART RATE: 80 BPM | SYSTOLIC BLOOD PRESSURE: 126 MMHG | BODY MASS INDEX: 33.82 KG/M2

## 2018-01-15 NOTE — RESULT NOTES
Message   thyroid nodule is stable     Verified Results  [address] [place] [address] [id number] 09:02AM Ed Gordon Order Number: [id number]    - Patient Instructions: To schedule this appointment, please contact Central Scheduling at [phone]   Order Number: [id number]    - Patient Instructions: To schedule this appointment, please contact Central Scheduling at [phone]  Test Name Result Flag Reference   US THYROID (Report)     THYROID ULTRASOUND     INDICATION: Follow-up nodules, nontoxic single thyroid nodule  COMPARISON: Ultrasound 11/9/2015     TECHNIQUE:  Ultrasound of the thyroid was performed with a high frequency linear transducer in transverse and sagittal planes including volumetric imaging sweeps as well as traditional still imaging technique  FINDINGS:   Minimally heterogeneous parenchymal echotexture  Right gland: 3 8 x 1 5 x 1 6 cm  No dominant nodules  Left gland: 4 0 x 1 5 x 1 4 cm  No dominant nodules  Small colloid cyst upper pole  Isthmus: 0 3 cm in AP dimension  1 5 x 0 8 x 1 4 cm left isthmic nodule, previously 1 5 x 0 8 x 1 3 cm  IMPRESSION:      Stable left isthmic nodule         Workstation performed: [id number]     Signed by:   Bc Coleman DO   10/15/16

## 2018-01-16 NOTE — RESULT NOTES
Verified Results  (1) COMPREHENSIVE METABOLIC PANEL [id number] 85:19QB Emma Shaikh   National Kidney Disease Education Program recommendations are as follows:  GFR calculation is accurate only with a steady state creatinine  Chronic Kidney disease less than 60 ml/min/1 73 sq  meters  Kidney failure less than 15 ml/min/1 73 sq  meters  Test Name Result Flag Reference   GLUCOSE,RANDM 128 mg/dL     If the patient is fasting, the ADA then defines impaired fasting glucose as > 100 mg/dL and diabetes as > or equal to 123 mg/dL  SODIUM 139 mmol/L  136-145   POTASSIUM 4 0 mmol/L  3 5-5 3   CHLORIDE 105 mmol/L  100-108   CARBON DIOXIDE 25 mmol/L  21-32   ANION GAP (CALC) 9 mmol/L  4-13   BLOOD UREA NITROGEN 24 mg/dL  5-25   CREATININE 0 89 mg/dL  0 60-1 30   Standardized to IDMS reference method   CALCIUM 9 4 mg/dL  8 3-10 1   BILI, TOTAL 0 71 mg/dL  0 20-1 00   ALK PHOSPHATAS 81 U/L     ALT (SGPT) 27 U/L  12-78   AST(SGOT) 17 U/L  5-45   ALBUMIN 3 9 g/dL  3 5-5 0   TOTAL PROTEIN 7 5 g/dL  6 4-8 2   eGFR Non-African American      >60 0 ml/min/1 73sq m     (1) LIPID PANEL, FASTING 27Apr2016 03:50PM Emma Shaikh   Triglyceride:         Normal              <150 mg/dl       Borderline High    150-199 mg/dl       High               200-499 mg/dl       Very High          >499 mg/dl  Cholesterol:         Desirable        <200 mg/dl      Borderline High  200-239 mg/dl      High             >239 mg/dl  HDL Cholesterol:        High    >59 mg/dL      Low     <41 mg/dL  LDL CALCULATED:    This screening LDL is a calculated result  It does not have the accuracy of the Direct Measured LDL in the monitoring of patients with hyperlipidemia and/or statin therapy  Direct Measure LDL (PIM171) must be ordered separately in these patients       Test Name Result Flag Reference   CHOLESTEROL 127 mg/dL     HDL,DIRECT 39 mg/dL L 40-60   Specimen collection should occur prior to Metamizole administration due to the potential for falsely depressed results  LDL CHOLESTEROL CALCULATED 34 mg/dL  0-100   TRIGLYCERIDES 270 mg/dL H <=150   Specimen collection should occur prior to N-Acetylcysteine or Metamizole administration due to the potential for falsely depressed results

## 2018-01-17 NOTE — RESULT NOTES
Verified Results  (1) HEMOGLOBIN A1C 27Apr2016 03:50PM Emma Shaikh   5 7-6 4% impaired fasting glucose  >=6 5% diagnosis of diabetes    Falsely low levels are seen in conditions linked to short RBC life span-  hemolytic anemia, and splenomegaly  Falsely elevated levels are seen in situations where there is an increased production of RBC- receipt of erythropoietin or blood transfusions  Adopted from ADA-Clinical Practice Recommendations     Test Name Result Flag Reference   HEMOGLOBIN A1C 8 0 % H 4 0-5 6   EST  AVG   GLUCOSE 183 mg/dl

## 2018-01-17 NOTE — RESULT NOTES
Message   Hemoglobin A1c has increased  Had jardiance 25 mg per day  She does have protein in the urine  We will need to follow this over time  [facility] laboratory testing is normal      Verified Results  (1) COMPREHENSIVE METABOLIC PANEL [id number] 61:87PA Irajlenin Chapmanann Order Number: [id number]     Test Name Result Flag Reference   SODIUM 137 mmol/L  136-145   POTASSIUM 3 9 mmol/L  3 5-5 3   CHLORIDE 103 mmol/L  100-108   CARBON DIOXIDE 24 mmol/L  21-32   ANION GAP (CALC) 10 mmol/L  4-13   BLOOD UREA NITROGEN 28 mg/dL H 5-25   CREATININE 0 91 mg/dL  0 60-1 30   Standardized to IDMS reference method   CALCIUM 9 5 mg/dL  8 3-10 1   BILI, TOTAL 0 79 mg/dL  0 20-1 00   ALK PHOSPHATAS 90 U/L     ALT (SGPT) 25 U/L  12-78   AST(SGOT) 16 U/L  5-45   ALBUMIN 3 8 g/dL  3 5-5 0   TOTAL PROTEIN 7 9 g/dL  6 4-8 2   eGFR Non-African American      >60 0 ml/min/1 73sq m   - Patient Instructions: This is a fasting blood test  Water,black tea or black  coffee only after 9:00pm the night before test Drink 2 glasses of water the morning of test - Patient Instructions: This bloodwork is non-fasting  Please drink two glasses of   water morning of bloodwork  National Kidney Disease Education Program recommendations are as follows:  GFR calculation is accurate only with a steady state creatinine  Chronic Kidney disease less than 60 ml/min/1 73 sq  meters  Kidney failure less than 15 ml/min/1 73 sq  meters  GLUCOSE FASTING 133 mg/dL H 65-99     (1) HEMOGLOBIN A1C [id number] 09:32AM lenin Ger Order Number: [id number]     Test Name Result Flag Reference   HEMOGLOBIN A1C 7 8 % H 4 2-6 3   EST  AVG   GLUCOSE 177 mg/dl       (1) MICROALBUMIN CREATININE RATIO, RANDOM URINE 24Mar2017 09:32AM lenin Ger Order Number: [id number]     Test Name Result Flag Reference   MICROALBUMIN/ CREAT R 201 mg/g creatinine H 0-30   MICROALBUMIN,URINE 70 2 mg/L H 0 0-20 0   CREATININE URINE 34 9 mg/dL (1) T4, FREE 24Mar2017 09:32AM Anjali Bonine Order Number: [id number]     Test Name Result Flag Reference   T4,FREE 1 07 ng/dL  0 76-1 46   - Patient Instructions: This is a fasting blood test  Water,black tea or black  coffee only after 9:00pm the night before test Drink 2 glasses of water the morning of test - Patient Instructions: This bloodwork is non-fasting  Please drink two glasses of   water morning of bloodwork  (1) [facility] [id number] 09:32AM [facility] Number: [id number]     Test Name Result Flag Reference   TSH 2 180 uIU/mL  0 358-3 740   - Patient Instructions: This bloodwork is non-fasting  Please drink two glasses of water morning of bloodwork  - Patient Instructions: This is a fasting blood test  Water,black tea or black  coffee only after 9:00pm the night before test Drink 2 glasses of water the morning of test - Patient Instructions: This bloodwork is non-fasting  Please drink two glasses of   water morning of bloodwork  Patients undergoing fluorescein dye angiography may retain small amounts of fluorescein in the body for 48-72 hours post procedure  Samples containing fluorescein can produce falsely depressed TSH values  If the patient had this procedure,a specimen should be resubmitted post fluorescein clearance  The recommended reference ranges for TSH during pregnancy are as follows:  First trimester 0 1 to 2 5 uIU/mL  Second trimester  0 2 to 3 0 uIU/mL  Third trimester 0 3 to 3 0 uIU/m     (1) LIPID PANEL, FASTING 24Mar2017 09:32AM Anjali Bonine Order Number: [id number]     Test Name Result Flag Reference   CHOLESTEROL 136 mg/dL     HDL,DIRECT 46 mg/dL  40-60   Specimen collection should occur prior to Metamizole administration due to the potential for falsely depressed results  LDL CHOLESTEROL CALCULATED 39 mg/dL  0-100   - Patient Instructions:  This is a fasting blood test  Water,black tea or black  coffee only after 9:00pm the night before test   Drink 2 glasses of water the morning of test     - Patient Instructions: This is a fasting blood test  Water,black tea or black  coffee only after 9:00pm the night before test Drink 2 glasses of water the morning of test - Patient Instructions: This bloodwork is non-fasting  Please drink two glasses of   water morning of bloodwork  Triglyceride:         Normal              <150 mg/dl       Borderline High    150-199 mg/dl       High               200-499 mg/dl       Very High          >499 mg/dl  Cholesterol:         Desirable        <200 mg/dl      Borderline High  200-239 mg/dl      High             >239 mg/dl  HDL Cholesterol:        High    >59 mg/dL      Low     <41 mg/dL  LDL CALCULATED:    This screening LDL is a calculated result  It does not have the accuracy of the Direct Measured LDL in the monitoring of patients with hyperlipidemia and/or statin therapy  Direct Measure LDL (PSZ711) must be ordered separately in these patients  TRIGLYCERIDES 253 mg/dL H <=150   Specimen collection should occur prior to N-Acetylcysteine or Metamizole administration due to the potential for falsely depressed results

## 2018-01-22 VITALS
HEART RATE: 76 BPM | HEIGHT: 61 IN | WEIGHT: 179 LBS | DIASTOLIC BLOOD PRESSURE: 78 MMHG | RESPIRATION RATE: 16 BRPM | TEMPERATURE: 97.6 F | BODY MASS INDEX: 33.79 KG/M2 | SYSTOLIC BLOOD PRESSURE: 138 MMHG

## 2018-03-12 ENCOUNTER — TRANSCRIBE ORDERS (OUTPATIENT)
Dept: ADMINISTRATIVE | Age: 73
End: 2018-03-12

## 2018-03-12 ENCOUNTER — APPOINTMENT (OUTPATIENT)
Dept: LAB | Age: 73
End: 2018-03-12
Payer: MEDICARE

## 2018-03-12 DIAGNOSIS — E11.9 DIABETES MELLITUS WITHOUT COMPLICATION (HCC): ICD-10-CM

## 2018-03-12 DIAGNOSIS — E78.5 HYPERLIPIDEMIA, UNSPECIFIED HYPERLIPIDEMIA TYPE: ICD-10-CM

## 2018-03-12 DIAGNOSIS — I10 ESSENTIAL HYPERTENSION, MALIGNANT: Primary | ICD-10-CM

## 2018-03-12 DIAGNOSIS — I10 ESSENTIAL HYPERTENSION, MALIGNANT: ICD-10-CM

## 2018-03-12 LAB
ALBUMIN SERPL BCP-MCNC: 3.7 G/DL (ref 3.5–5)
ALP SERPL-CCNC: 82 U/L (ref 46–116)
ALT SERPL W P-5'-P-CCNC: 27 U/L (ref 12–78)
ANION GAP SERPL CALCULATED.3IONS-SCNC: 8 MMOL/L (ref 4–13)
AST SERPL W P-5'-P-CCNC: 34 U/L (ref 5–45)
BILIRUB SERPL-MCNC: 0.76 MG/DL (ref 0.2–1)
BUN SERPL-MCNC: 23 MG/DL (ref 5–25)
CALCIUM SERPL-MCNC: 9.8 MG/DL (ref 8.3–10.1)
CHLORIDE SERPL-SCNC: 104 MMOL/L (ref 100–108)
CO2 SERPL-SCNC: 24 MMOL/L (ref 21–32)
CREAT SERPL-MCNC: 0.88 MG/DL (ref 0.6–1.3)
EST. AVERAGE GLUCOSE BLD GHB EST-MCNC: 200 MG/DL
GFR SERPL CREATININE-BSD FRML MDRD: 66 ML/MIN/1.73SQ M
GLUCOSE P FAST SERPL-MCNC: 147 MG/DL (ref 65–99)
HBA1C MFR BLD: 8.6 % (ref 4.2–6.3)
LDLC SERPL DIRECT ASSAY-MCNC: 51 MG/DL (ref 0–100)
POTASSIUM SERPL-SCNC: 4.1 MMOL/L (ref 3.5–5.3)
PROT SERPL-MCNC: 7.8 G/DL (ref 6.4–8.2)
SODIUM SERPL-SCNC: 136 MMOL/L (ref 136–145)

## 2018-03-12 PROCEDURE — 80053 COMPREHEN METABOLIC PANEL: CPT

## 2018-03-12 PROCEDURE — 36415 COLL VENOUS BLD VENIPUNCTURE: CPT

## 2018-03-12 PROCEDURE — 83721 ASSAY OF BLOOD LIPOPROTEIN: CPT

## 2018-03-12 PROCEDURE — 83036 HEMOGLOBIN GLYCOSYLATED A1C: CPT

## 2018-05-21 ENCOUNTER — HOSPITAL ENCOUNTER (OUTPATIENT)
Dept: RADIOLOGY | Age: 73
Discharge: HOME | End: 2018-05-21
Payer: MEDICARE

## 2018-05-21 DIAGNOSIS — Z12.31 ENCOUNTER FOR SCREENING MAMMOGRAM FOR MALIGNANT NEOPLASM OF BREAST: ICD-10-CM

## 2018-05-21 PROCEDURE — 77067 SCR MAMMO BI INCL CAD: CPT

## 2018-07-12 ENCOUNTER — TRANSCRIBE ORDERS (OUTPATIENT)
Dept: ADMINISTRATIVE | Age: 73
End: 2018-07-12

## 2018-07-12 ENCOUNTER — APPOINTMENT (OUTPATIENT)
Dept: LAB | Age: 73
End: 2018-07-12
Payer: MEDICARE

## 2018-07-12 DIAGNOSIS — E13.8 DIABETES MELLITUS OF OTHER TYPE WITH COMPLICATION, UNSPECIFIED WHETHER LONG TERM INSULIN USE: Primary | ICD-10-CM

## 2018-07-12 DIAGNOSIS — E78.5 HYPERLIPIDEMIA, UNSPECIFIED HYPERLIPIDEMIA TYPE: ICD-10-CM

## 2018-07-12 DIAGNOSIS — E13.8 DIABETES MELLITUS OF OTHER TYPE WITH COMPLICATION, UNSPECIFIED WHETHER LONG TERM INSULIN USE: ICD-10-CM

## 2018-07-12 LAB
ALBUMIN SERPL BCP-MCNC: 3.8 G/DL (ref 3.5–5)
ALP SERPL-CCNC: 84 U/L (ref 46–116)
ALT SERPL W P-5'-P-CCNC: 28 U/L (ref 12–78)
ANION GAP SERPL CALCULATED.3IONS-SCNC: 8 MMOL/L (ref 4–13)
AST SERPL W P-5'-P-CCNC: 25 U/L (ref 5–45)
BILIRUB SERPL-MCNC: 0.8 MG/DL (ref 0.2–1)
BUN SERPL-MCNC: 21 MG/DL (ref 5–25)
CALCIUM SERPL-MCNC: 10.1 MG/DL (ref 8.3–10.1)
CHLORIDE SERPL-SCNC: 103 MMOL/L (ref 100–108)
CHOLEST SERPL-MCNC: 125 MG/DL (ref 50–200)
CO2 SERPL-SCNC: 25 MMOL/L (ref 21–32)
CREAT SERPL-MCNC: 0.9 MG/DL (ref 0.6–1.3)
EST. AVERAGE GLUCOSE BLD GHB EST-MCNC: 192 MG/DL
GFR SERPL CREATININE-BSD FRML MDRD: 64 ML/MIN/1.73SQ M
GLUCOSE P FAST SERPL-MCNC: 144 MG/DL (ref 65–99)
HBA1C MFR BLD: 8.3 % (ref 4.2–6.3)
HDLC SERPL-MCNC: 40 MG/DL (ref 40–60)
LDLC SERPL CALC-MCNC: 31 MG/DL (ref 0–100)
NONHDLC SERPL-MCNC: 85 MG/DL
POTASSIUM SERPL-SCNC: 4.1 MMOL/L (ref 3.5–5.3)
PROT SERPL-MCNC: 8 G/DL (ref 6.4–8.2)
SODIUM SERPL-SCNC: 136 MMOL/L (ref 136–145)
TRIGL SERPL-MCNC: 269 MG/DL

## 2018-07-12 PROCEDURE — 36415 COLL VENOUS BLD VENIPUNCTURE: CPT

## 2018-07-12 PROCEDURE — 80053 COMPREHEN METABOLIC PANEL: CPT

## 2018-07-12 PROCEDURE — 83036 HEMOGLOBIN GLYCOSYLATED A1C: CPT

## 2018-07-12 PROCEDURE — 80061 LIPID PANEL: CPT

## 2018-09-12 ENCOUNTER — TRANSCRIBE ORDERS (OUTPATIENT)
Dept: ADMINISTRATIVE | Age: 73
End: 2018-09-12

## 2018-09-12 ENCOUNTER — APPOINTMENT (OUTPATIENT)
Dept: RADIOLOGY | Age: 73
End: 2018-09-12
Payer: MEDICARE

## 2018-09-12 DIAGNOSIS — J18.9 PNEUMONIA DUE TO INFECTIOUS ORGANISM, UNSPECIFIED LATERALITY, UNSPECIFIED PART OF LUNG: Primary | ICD-10-CM

## 2018-09-12 DIAGNOSIS — J18.9 PNEUMONIA DUE TO INFECTIOUS ORGANISM, UNSPECIFIED LATERALITY, UNSPECIFIED PART OF LUNG: ICD-10-CM

## 2018-09-12 PROCEDURE — 71046 X-RAY EXAM CHEST 2 VIEWS: CPT

## 2018-11-02 NOTE — TELEPHONE ENCOUNTER
Pt called requesting One touch Verio IQ test strips  Dr Daniela Monroe had advised pt to come to office PRN and to follow up with family physician  Last ov 3/17/17  Advised pt to call family physician any problems she will call back

## 2019-02-25 ENCOUNTER — APPOINTMENT (OUTPATIENT)
Dept: LAB | Age: 74
End: 2019-02-25
Payer: MEDICARE

## 2019-02-25 ENCOUNTER — TRANSCRIBE ORDERS (OUTPATIENT)
Dept: ADMINISTRATIVE | Age: 74
End: 2019-02-25

## 2019-02-25 DIAGNOSIS — E11.69 TYPE 2 DIABETES MELLITUS WITH OTHER SPECIFIED COMPLICATION, UNSPECIFIED WHETHER LONG TERM INSULIN USE (HCC): ICD-10-CM

## 2019-02-25 DIAGNOSIS — I10 ESSENTIAL HYPERTENSION, MALIGNANT: ICD-10-CM

## 2019-02-25 DIAGNOSIS — I10 ESSENTIAL HYPERTENSION, MALIGNANT: Primary | ICD-10-CM

## 2019-02-25 DIAGNOSIS — E55.9 VITAMIN D DEFICIENCY DISEASE: ICD-10-CM

## 2019-02-25 LAB
25(OH)D3 SERPL-MCNC: 26 NG/ML (ref 30–100)
ANION GAP SERPL CALCULATED.3IONS-SCNC: 6 MMOL/L (ref 4–13)
BUN SERPL-MCNC: 28 MG/DL (ref 5–25)
CALCIUM SERPL-MCNC: 9.4 MG/DL (ref 8.3–10.1)
CHLORIDE SERPL-SCNC: 106 MMOL/L (ref 100–108)
CO2 SERPL-SCNC: 24 MMOL/L (ref 21–32)
CREAT SERPL-MCNC: 0.88 MG/DL (ref 0.6–1.3)
EST. AVERAGE GLUCOSE BLD GHB EST-MCNC: 226 MG/DL
GFR SERPL CREATININE-BSD FRML MDRD: 66 ML/MIN/1.73SQ M
GLUCOSE P FAST SERPL-MCNC: 152 MG/DL (ref 65–99)
HBA1C MFR BLD: 9.5 % (ref 4.2–6.3)
POTASSIUM SERPL-SCNC: 4 MMOL/L (ref 3.5–5.3)
SODIUM SERPL-SCNC: 136 MMOL/L (ref 136–145)

## 2019-02-25 PROCEDURE — 80048 BASIC METABOLIC PNL TOTAL CA: CPT

## 2019-02-25 PROCEDURE — 83036 HEMOGLOBIN GLYCOSYLATED A1C: CPT

## 2019-02-25 PROCEDURE — 82306 VITAMIN D 25 HYDROXY: CPT

## 2019-02-25 PROCEDURE — 36415 COLL VENOUS BLD VENIPUNCTURE: CPT

## 2019-05-15 DIAGNOSIS — Z71.89 COMPLEX CARE COORDINATION: Primary | ICD-10-CM

## 2019-05-16 ENCOUNTER — PATIENT OUTREACH (OUTPATIENT)
Dept: CASE MANAGEMENT | Facility: OTHER | Age: 74
End: 2019-05-16

## 2019-05-23 ENCOUNTER — PATIENT OUTREACH (OUTPATIENT)
Dept: CASE MANAGEMENT | Facility: OTHER | Age: 74
End: 2019-05-23

## 2019-05-30 ENCOUNTER — PATIENT OUTREACH (OUTPATIENT)
Dept: CASE MANAGEMENT | Facility: OTHER | Age: 74
End: 2019-05-30

## 2019-06-05 ENCOUNTER — PATIENT OUTREACH (OUTPATIENT)
Dept: CASE MANAGEMENT | Facility: OTHER | Age: 74
End: 2019-06-05

## 2019-06-06 ENCOUNTER — PATIENT OUTREACH (OUTPATIENT)
Dept: CASE MANAGEMENT | Facility: OTHER | Age: 74
End: 2019-06-06

## 2019-06-06 DIAGNOSIS — Z78.9 NEED FOR FOLLOW-UP BY SOCIAL WORKER: Primary | ICD-10-CM

## 2019-06-12 ENCOUNTER — PATIENT OUTREACH (OUTPATIENT)
Dept: CASE MANAGEMENT | Facility: OTHER | Age: 74
End: 2019-06-12

## 2019-06-25 ENCOUNTER — TRANSCRIBE ORDERS (OUTPATIENT)
Dept: ADMINISTRATIVE | Age: 74
End: 2019-06-25

## 2019-06-25 ENCOUNTER — APPOINTMENT (OUTPATIENT)
Dept: LAB | Age: 74
End: 2019-06-25
Payer: MEDICARE

## 2019-06-25 ENCOUNTER — PATIENT OUTREACH (OUTPATIENT)
Dept: CASE MANAGEMENT | Facility: OTHER | Age: 74
End: 2019-06-25

## 2019-06-25 DIAGNOSIS — E13.8 DIABETES MELLITUS OF OTHER TYPE WITH COMPLICATION, UNSPECIFIED WHETHER LONG TERM INSULIN USE: ICD-10-CM

## 2019-06-25 DIAGNOSIS — E78.5 HYPERLIPIDEMIA, UNSPECIFIED HYPERLIPIDEMIA TYPE: ICD-10-CM

## 2019-06-25 DIAGNOSIS — E13.8 DIABETES MELLITUS OF OTHER TYPE WITH COMPLICATION, UNSPECIFIED WHETHER LONG TERM INSULIN USE: Primary | ICD-10-CM

## 2019-06-25 LAB
ALBUMIN SERPL BCP-MCNC: 3.8 G/DL (ref 3.5–5)
ALP SERPL-CCNC: 79 U/L (ref 46–116)
ALT SERPL W P-5'-P-CCNC: 26 U/L (ref 12–78)
ANION GAP SERPL CALCULATED.3IONS-SCNC: 9 MMOL/L (ref 4–13)
AST SERPL W P-5'-P-CCNC: 26 U/L (ref 5–45)
BILIRUB SERPL-MCNC: 0.73 MG/DL (ref 0.2–1)
BUN SERPL-MCNC: 20 MG/DL (ref 5–25)
CALCIUM SERPL-MCNC: 9.7 MG/DL (ref 8.3–10.1)
CHLORIDE SERPL-SCNC: 105 MMOL/L (ref 100–108)
CO2 SERPL-SCNC: 25 MMOL/L (ref 21–32)
CREAT SERPL-MCNC: 0.82 MG/DL (ref 0.6–1.3)
EST. AVERAGE GLUCOSE BLD GHB EST-MCNC: 209 MG/DL
GFR SERPL CREATININE-BSD FRML MDRD: 72 ML/MIN/1.73SQ M
GLUCOSE P FAST SERPL-MCNC: 121 MG/DL (ref 65–99)
HBA1C MFR BLD: 8.9 % (ref 4.2–6.3)
LDLC SERPL DIRECT ASSAY-MCNC: 68 MG/DL (ref 0–100)
POTASSIUM SERPL-SCNC: 3.9 MMOL/L (ref 3.5–5.3)
PROT SERPL-MCNC: 7.6 G/DL (ref 6.4–8.2)
SODIUM SERPL-SCNC: 139 MMOL/L (ref 136–145)

## 2019-06-25 PROCEDURE — 80053 COMPREHEN METABOLIC PANEL: CPT

## 2019-06-25 PROCEDURE — 36415 COLL VENOUS BLD VENIPUNCTURE: CPT

## 2019-06-25 PROCEDURE — 83721 ASSAY OF BLOOD LIPOPROTEIN: CPT

## 2019-06-25 PROCEDURE — 83036 HEMOGLOBIN GLYCOSYLATED A1C: CPT

## 2019-07-01 ENCOUNTER — PATIENT OUTREACH (OUTPATIENT)
Dept: CASE MANAGEMENT | Facility: OTHER | Age: 74
End: 2019-07-01

## 2019-07-03 ENCOUNTER — PATIENT OUTREACH (OUTPATIENT)
Dept: CASE MANAGEMENT | Facility: OTHER | Age: 74
End: 2019-07-03

## 2019-07-03 NOTE — PROGRESS NOTES
Patient states she was seen by her PCP and was told her A1C was now down to 8 9 from 9 5  Patient expresses that she was hoping it would have gone down more  Congratulated patient for being motivated and assured her that she is on the right track with her activity level  Patient continues to exercise on the treadmill and is active with her grandchildren and yard work  Patient states she has not lost any weight, however  This care manager emphasized the importance of a proper diabetic diet and its importance in losing weight and maintaining normal blood sugar values  Patient verbalizes understanding of same  Patient's new goal is to get her A1C level down to an 8 by her next check  She will also be scheduling an appointment for a diabetic eye exam     No other questions or concerns at this time  Patient agreeable to continued outreach

## 2019-08-08 ENCOUNTER — PATIENT OUTREACH (OUTPATIENT)
Dept: CASE MANAGEMENT | Facility: OTHER | Age: 74
End: 2019-08-08

## 2019-08-08 NOTE — PROGRESS NOTES
Patient doing well  She states her FBS's range between 150-170  She continues to exercise on the treadmill but not as much as she used to because she has been watching her grandchildren every day  She states they will be going back to school so she will try to increase her exercise time on the treadmill  Patient has not weighed herself recently  Educated patient on proper diet as well as exercising to control blood sugars and weight loss  Encouraged patient to continue to read labels and watch sugar and caloric intake, as well as increase her activity on the treadmill or go for more frequent walks  Patient verbalizes understanding of same  Patient has not yet scheduled diabetic eye exam   Encouraged patient to do so  Patient agreeable to continued outreach

## 2019-09-13 ENCOUNTER — PATIENT OUTREACH (OUTPATIENT)
Dept: CASE MANAGEMENT | Facility: OTHER | Age: 74
End: 2019-09-13

## 2019-10-28 ENCOUNTER — TRANSCRIBE ORDERS (OUTPATIENT)
Dept: ADMINISTRATIVE | Age: 74
End: 2019-10-28

## 2019-10-28 ENCOUNTER — APPOINTMENT (OUTPATIENT)
Dept: LAB | Age: 74
End: 2019-10-28
Payer: MEDICARE

## 2019-10-28 DIAGNOSIS — I10 ESSENTIAL HYPERTENSION: ICD-10-CM

## 2019-10-28 DIAGNOSIS — E55.9 VITAMIN D DEFICIENCY: ICD-10-CM

## 2019-10-28 DIAGNOSIS — E13.69 OTHER SPECIFIED DIABETES MELLITUS WITH OTHER SPECIFIED COMPLICATION, UNSPECIFIED WHETHER LONG TERM INSULIN USE (HCC): ICD-10-CM

## 2019-10-28 DIAGNOSIS — E13.69 OTHER SPECIFIED DIABETES MELLITUS WITH OTHER SPECIFIED COMPLICATION, UNSPECIFIED WHETHER LONG TERM INSULIN USE (HCC): Primary | ICD-10-CM

## 2019-10-28 LAB
25(OH)D3 SERPL-MCNC: 22.7 NG/ML (ref 30–100)
ALBUMIN SERPL BCP-MCNC: 4.1 G/DL (ref 3.5–5)
ALP SERPL-CCNC: 87 U/L (ref 46–116)
ALT SERPL W P-5'-P-CCNC: 23 U/L (ref 12–78)
ANION GAP SERPL CALCULATED.3IONS-SCNC: 8 MMOL/L (ref 4–13)
AST SERPL W P-5'-P-CCNC: 17 U/L (ref 5–45)
BILIRUB SERPL-MCNC: 0.59 MG/DL (ref 0.2–1)
BUN SERPL-MCNC: 26 MG/DL (ref 5–25)
CALCIUM SERPL-MCNC: 9.7 MG/DL (ref 8.3–10.1)
CHLORIDE SERPL-SCNC: 108 MMOL/L (ref 100–108)
CO2 SERPL-SCNC: 25 MMOL/L (ref 21–32)
CREAT SERPL-MCNC: 0.99 MG/DL (ref 0.6–1.3)
EST. AVERAGE GLUCOSE BLD GHB EST-MCNC: 214 MG/DL
GFR SERPL CREATININE-BSD FRML MDRD: 57 ML/MIN/1.73SQ M
GLUCOSE P FAST SERPL-MCNC: 176 MG/DL (ref 65–99)
HBA1C MFR BLD: 9.1 % (ref 4.2–6.3)
POTASSIUM SERPL-SCNC: 4.4 MMOL/L (ref 3.5–5.3)
PROT SERPL-MCNC: 8 G/DL (ref 6.4–8.2)
SODIUM SERPL-SCNC: 141 MMOL/L (ref 136–145)

## 2019-10-28 PROCEDURE — 83036 HEMOGLOBIN GLYCOSYLATED A1C: CPT

## 2019-10-28 PROCEDURE — 82306 VITAMIN D 25 HYDROXY: CPT

## 2019-10-28 PROCEDURE — 80053 COMPREHEN METABOLIC PANEL: CPT

## 2019-10-28 PROCEDURE — 36415 COLL VENOUS BLD VENIPUNCTURE: CPT

## 2019-12-04 ENCOUNTER — TELEPHONE (OUTPATIENT)
Dept: ENDOCRINOLOGY | Facility: CLINIC | Age: 74
End: 2019-12-04

## 2019-12-04 DIAGNOSIS — E11.65 TYPE 2 DIABETES MELLITUS WITH HYPERGLYCEMIA, UNSPECIFIED WHETHER LONG TERM INSULIN USE (HCC): Primary | ICD-10-CM

## 2019-12-04 DIAGNOSIS — E04.2 MULTINODULAR GOITER: ICD-10-CM

## 2019-12-04 DIAGNOSIS — E55.9 VITAMIN D DEFICIENCY: ICD-10-CM

## 2019-12-04 NOTE — TELEPHONE ENCOUNTER
Left message for pt re: lab orders, we will mail, she is to call back for a sooner appt with Desiree then the one she has in march  A1C higher then before      Labs are not to be done before 1/29/2020

## 2019-12-04 NOTE — TELEPHONE ENCOUNTER
Patient is scheduled to see you on 3/16/20 and she is asking if you'd like her to have labs done prior? I told her I would mail them to her  Her number is [phone]  Thank you

## 2019-12-04 NOTE — TELEPHONE ENCOUNTER
Lab testing ordered-- should not be done before jan 29th  Also she should bring meter to visit check at least twice per day  Last A1C was elevated if possible she should come sooner than march, hasn't been here in a few years

## 2019-12-05 ENCOUNTER — TELEPHONE (OUTPATIENT)
Dept: ENDOCRINOLOGY | Facility: CLINIC | Age: 74
End: 2019-12-05

## 2019-12-11 ENCOUNTER — PATIENT OUTREACH (OUTPATIENT)
Dept: CASE MANAGEMENT | Facility: OTHER | Age: 74
End: 2019-12-11

## 2019-12-11 NOTE — PROGRESS NOTES
Patient due for outreach under quarterly surveillance  Chart review completed  Noted that patient's A1c increased to 9 1 on 10/28/19 from 8 9 on 6/25/19  Voicemail left requesting call back  Name and number provided

## 2019-12-16 ENCOUNTER — PATIENT OUTREACH (OUTPATIENT)
Dept: CASE MANAGEMENT | Facility: OTHER | Age: 74
End: 2019-12-16

## 2020-01-30 ENCOUNTER — APPOINTMENT (OUTPATIENT)
Dept: LAB | Age: 75
End: 2020-01-30
Payer: MEDICARE

## 2020-01-30 DIAGNOSIS — E04.2 MULTINODULAR GOITER: ICD-10-CM

## 2020-01-30 DIAGNOSIS — E11.65 TYPE 2 DIABETES MELLITUS WITH HYPERGLYCEMIA, UNSPECIFIED WHETHER LONG TERM INSULIN USE (HCC): ICD-10-CM

## 2020-01-30 LAB
ALBUMIN SERPL BCP-MCNC: 3.8 G/DL (ref 3.5–5)
ALP SERPL-CCNC: 83 U/L (ref 46–116)
ALT SERPL W P-5'-P-CCNC: 23 U/L (ref 12–78)
ANION GAP SERPL CALCULATED.3IONS-SCNC: 7 MMOL/L (ref 4–13)
AST SERPL W P-5'-P-CCNC: 16 U/L (ref 5–45)
BILIRUB SERPL-MCNC: 0.68 MG/DL (ref 0.2–1)
BUN SERPL-MCNC: 19 MG/DL (ref 5–25)
CALCIUM SERPL-MCNC: 10 MG/DL (ref 8.3–10.1)
CHLORIDE SERPL-SCNC: 106 MMOL/L (ref 100–108)
CO2 SERPL-SCNC: 28 MMOL/L (ref 21–32)
CREAT SERPL-MCNC: 0.83 MG/DL (ref 0.6–1.3)
CREAT UR-MCNC: 67.6 MG/DL
EST. AVERAGE GLUCOSE BLD GHB EST-MCNC: 194 MG/DL
GFR SERPL CREATININE-BSD FRML MDRD: 70 ML/MIN/1.73SQ M
GLUCOSE P FAST SERPL-MCNC: 135 MG/DL (ref 65–99)
HBA1C MFR BLD: 8.4 % (ref 4.2–6.3)
MICROALBUMIN UR-MCNC: 195 MG/L (ref 0–20)
MICROALBUMIN/CREAT 24H UR: 288 MG/G CREATININE (ref 0–30)
POTASSIUM SERPL-SCNC: 4.1 MMOL/L (ref 3.5–5.3)
PROT SERPL-MCNC: 8.3 G/DL (ref 6.4–8.2)
SODIUM SERPL-SCNC: 141 MMOL/L (ref 136–145)
T4 FREE SERPL-MCNC: 0.99 NG/DL (ref 0.76–1.46)
TSH SERPL DL<=0.05 MIU/L-ACNC: 1.67 UIU/ML (ref 0.36–3.74)

## 2020-01-30 PROCEDURE — 82570 ASSAY OF URINE CREATININE: CPT

## 2020-01-30 PROCEDURE — 83036 HEMOGLOBIN GLYCOSYLATED A1C: CPT

## 2020-01-30 PROCEDURE — 36415 COLL VENOUS BLD VENIPUNCTURE: CPT

## 2020-01-30 PROCEDURE — 84443 ASSAY THYROID STIM HORMONE: CPT

## 2020-01-30 PROCEDURE — 84439 ASSAY OF FREE THYROXINE: CPT

## 2020-01-30 PROCEDURE — 80053 COMPREHEN METABOLIC PANEL: CPT

## 2020-01-30 PROCEDURE — 82043 UR ALBUMIN QUANTITATIVE: CPT

## 2020-02-03 ENCOUNTER — TELEPHONE (OUTPATIENT)
Dept: ENDOCRINOLOGY | Facility: CLINIC | Age: 75
End: 2020-02-03

## 2020-02-03 NOTE — TELEPHONE ENCOUNTER
----- Message from Yandy Wolfe PA-C sent at 2/3/2020 12:47 PM EST -----  A1C 8 4- high but improevd from prior testing    Follow up as scheduled please bring BG readings to upcoming visit will review rest of labs in detail at that time

## 2020-02-07 RX ORDER — LATANOPROST 50 UG/ML
1 SOLUTION/ DROPS OPHTHALMIC
COMMUNITY
Start: 2019-11-26

## 2020-02-07 RX ORDER — LANCETS 30 GAUGE
EACH MISCELLANEOUS
COMMUNITY
Start: 2014-11-06

## 2020-02-07 RX ORDER — BLOOD SUGAR DIAGNOSTIC
STRIP MISCELLANEOUS
Refills: 3 | COMMUNITY
Start: 2019-11-01 | End: 2020-06-15 | Stop reason: SDUPTHER

## 2020-02-10 ENCOUNTER — OFFICE VISIT (OUTPATIENT)
Dept: ENDOCRINOLOGY | Facility: CLINIC | Age: 75
End: 2020-02-10
Payer: MEDICARE

## 2020-02-10 VITALS
HEART RATE: 66 BPM | SYSTOLIC BLOOD PRESSURE: 140 MMHG | BODY MASS INDEX: 32.65 KG/M2 | HEIGHT: 62 IN | DIASTOLIC BLOOD PRESSURE: 74 MMHG | WEIGHT: 177.4 LBS

## 2020-02-10 DIAGNOSIS — Z79.4 TYPE 2 DIABETES MELLITUS WITH HYPERGLYCEMIA, WITH LONG-TERM CURRENT USE OF INSULIN (HCC): Primary | ICD-10-CM

## 2020-02-10 DIAGNOSIS — E78.5 HYPERLIPIDEMIA, UNSPECIFIED HYPERLIPIDEMIA TYPE: ICD-10-CM

## 2020-02-10 DIAGNOSIS — E11.65 TYPE 2 DIABETES MELLITUS WITH HYPERGLYCEMIA, WITH LONG-TERM CURRENT USE OF INSULIN (HCC): Primary | ICD-10-CM

## 2020-02-10 DIAGNOSIS — R80.9 MICROALBUMINURIA: ICD-10-CM

## 2020-02-10 DIAGNOSIS — Z79.4 TYPE 2 DIABETES MELLITUS WITH HYPERGLYCEMIA, WITH LONG-TERM CURRENT USE OF INSULIN (HCC): ICD-10-CM

## 2020-02-10 DIAGNOSIS — E11.65 TYPE 2 DIABETES MELLITUS WITH HYPERGLYCEMIA, WITH LONG-TERM CURRENT USE OF INSULIN (HCC): ICD-10-CM

## 2020-02-10 DIAGNOSIS — E04.1 THYROID NODULE: ICD-10-CM

## 2020-02-10 DIAGNOSIS — I10 HYPERTENSION, UNSPECIFIED TYPE: ICD-10-CM

## 2020-02-10 DIAGNOSIS — E55.9 VITAMIN D DEFICIENCY: ICD-10-CM

## 2020-02-10 PROBLEM — I82.409 DVT (DEEP VENOUS THROMBOSIS) (HCC): Status: ACTIVE | Noted: 2020-02-10

## 2020-02-10 PROBLEM — I82.409 DVT (DEEP VENOUS THROMBOSIS) (HCC): Status: RESOLVED | Noted: 2020-02-10 | Resolved: 2020-02-10

## 2020-02-10 PROBLEM — N17.9 AKI (ACUTE KIDNEY INJURY) (HCC): Status: ACTIVE | Noted: 2019-12-30

## 2020-02-10 PROBLEM — I82.453: Status: ACTIVE | Noted: 2017-12-27

## 2020-02-10 PROBLEM — R25.1 TREMOR: Status: ACTIVE | Noted: 2020-02-10

## 2020-02-10 PROBLEM — I82.453: Status: RESOLVED | Noted: 2017-12-27 | Resolved: 2020-02-10

## 2020-02-10 PROBLEM — N17.9 AKI (ACUTE KIDNEY INJURY) (HCC): Status: RESOLVED | Noted: 2019-12-30 | Resolved: 2020-02-10

## 2020-02-10 PROCEDURE — 99215 OFFICE O/P EST HI 40 MIN: CPT | Performed by: PHYSICIAN ASSISTANT

## 2020-02-10 RX ORDER — BLOOD-GLUCOSE METER
EACH MISCELLANEOUS
Qty: 1 KIT | Refills: 1 | Status: SHIPPED | OUTPATIENT
Start: 2020-02-10

## 2020-02-10 RX ORDER — GLIMEPIRIDE 2 MG/1
2 TABLET ORAL 2 TIMES DAILY
Qty: 180 TABLET | Refills: 3 | Status: SHIPPED | OUTPATIENT
Start: 2020-02-10 | End: 2020-11-23

## 2020-02-10 RX ORDER — ACETAMINOPHEN 160 MG
2000 TABLET,DISINTEGRATING ORAL DAILY
Qty: 30 CAPSULE | Refills: 5
Start: 2020-02-10 | End: 2021-01-13 | Stop reason: SDUPTHER

## 2020-02-10 RX ORDER — BLOOD-GLUCOSE METER
EACH MISCELLANEOUS
Qty: 1 KIT | Refills: 1 | Status: SHIPPED | OUTPATIENT
Start: 2020-02-10 | End: 2020-02-10 | Stop reason: SDUPTHER

## 2020-02-10 NOTE — PROGRESS NOTES
Established Patient Progress Note      Chief Complaint   Patient presents with    Diabetes Type 2    Hypertension    Hyperlipidemia        History of Present Illness:   Shamika Chicas is a 69 y o  female with a history of type 2 diabetes with long term use of insulin since many years ago  Reports complications of microalbuminuiria  Has been following with PCP past few years but decided to return to endocrinology due to increasing A1C  Denies recent severe hypoglycemic or severe hyperglycemic episodes  Denies any issues with her current regimen  home glucose monitoring: are performed regularly about 2x per day  Reporting hunger- could snack all day  Used to exercise on treadmill-- hasn't since kidney stones New years sonja, plans to resume walking on treadmill  Has met with dietician in past    Home blood glucose readings:   Before breakfast: Typically in the low 100s, occasionally higher up to 160s  Rest of day usually mid 100s with occasional excursions over 200, with 1 high of 317 after chinese food  Current regimen:   Metformin 1000mg twice per day   Humalog 18 units before dinner  Lantus 40 units bedtime  Glimepiride 2mg breakfast and dinner    Last Eye Exam: UTD-- appt this week, Dr Suzan Irene  Last Foot Exam: today at visit    Has hypertension: Taking losartan HCT  Has hyperlipidemia: Taking atorvastatin    Thyroid nodule 2016 no dysphagia  No FH thyroid CA, no rad  Patient Active Problem List   Diagnosis    Hyperlipidemia    Hypertension    Microalbuminuria    Thyroid nodule    Tremor    Type 2 diabetes mellitus with hyperglycemia, with long-term current use of insulin (HCC)    Vitamin D deficiency      Past Medical History:   Diagnosis Date    [facility] mellitus ([facility] [address] )     Disease of thyroid gland     DVT, bilateral lower limbs ([facility] [address] [facility]     Hyperlipidemia     Hypertension     Kidney stones     Renal disorder       History reviewed  No pertinent surgical history  History reviewed  No pertinent family history  Social History     Tobacco Use    Smoking status: Never Smoker    Smokeless tobacco: Never Used   Substance Use Topics    Alcohol use: No     Allergies   Allergen Reactions    Sulfa Antibiotics Rash         Current Outpatient Medications:     apixaban (ELIQUIS) 5 mg, Take 5 mg by mouth 2 (two) times a day, Disp: , Rfl:     atorvastatin (LIPITOR) 20 mg tablet, Take by mouth, Disp: , Rfl:     glimepiride (AMARYL) 2 mg tablet, Take 1 tablet (2 mg total) by mouth 2 (two) times a day, Disp: 180 tablet, Rfl: 3    Insulin Glargine (LANTUS SOLOSTAR) 100 UNIT/ML SOPN, Inject 40 Units under the skin daily at bedtime , Disp: , Rfl:     latanoprost (XALATAN) 0 005 % ophthalmic solution, Administer 1 drop to both eyes daily at bedtime, Disp: , Rfl:     losartan-hydrochlorothiazide (HYZAAR) 100-12 5 MG per tablet, Take by mouth, Disp: , Rfl:     metFORMIN (GLUCOPHAGE) 1000 MG tablet, Take 1,000 mg by mouth 2 (two) times a day with meals , Disp: , Rfl:     ONETOUCH DELICA LANCETS 56M MISC, TEST BLOOD SUGARS THREE TIMES DAILY, Disp: , Rfl:     ONETOUCH VERIO test strip, TEST BLOOD SUGARS THREE TIMES DAILY, Disp: , Rfl: 3    propranolol (INDERAL) 20 mg tablet, Take by mouth, Disp: , Rfl:     Blood Glucose Monitoring Suppl (ONETOUCH VERIO) w/Device KIT, Use to check BG  DX E11 9, Disp: 1 kit, Rfl: 1    Cholecalciferol (VITAMIN D3) 50 MCG (2000 UT) capsule, Take 1 capsule (2,000 Units total) by mouth daily, Disp: 30 capsule, Rfl: 5    Dulaglutide (TRULICITY) 4 24 RM/0 8KW SOPN, Inject 0 5 mL (0 75 mg total) under the skin once a week, Disp: 4 pen, Rfl: 5    Review of Systems   Constitutional: Negative for activity change, appetite change, chills, diaphoresis, fatigue, fever and unexpected weight change  HENT: Negative for trouble swallowing and voice change  Eyes: Negative for visual disturbance  Respiratory: Negative for shortness of breath      Cardiovascular: Negative for chest pain and palpitations  Gastrointestinal: Negative for abdominal pain, constipation and diarrhea  Endocrine: Negative for cold intolerance, heat intolerance, polydipsia, polyphagia and polyuria  Genitourinary: Negative for frequency and menstrual problem  Musculoskeletal: Negative for arthralgias and myalgias  Skin: Negative for rash  Allergic/Immunologic: Negative for food allergies  Neurological: Positive for tremors  Negative for dizziness  Hematological: Negative for adenopathy  Psychiatric/Behavioral: Negative for sleep disturbance  All other systems reviewed and are negative  Physical Exam:  Body mass index is 32 98 kg/m²  /74   Pulse 66   Ht 5' 1 5" (1 562 m)   Wt 80 5 kg (177 lb 6 4 oz)   BMI 32 98 kg/m²    Wt Readings from Last 3 Encounters:   02/10/20 80 5 kg (177 lb 6 4 oz)   12/27/17 81 2 kg (179 lb)   07/08/17 79 4 kg (175 lb)       Physical Exam   Constitutional: She is oriented to person, place, and time  She appears well-developed and well-nourished  No distress  HENT:   Head: Normocephalic and atraumatic  Eyes: Pupils are equal, round, and reactive to light  Conjunctivae are normal    Neck: Normal range of motion  Neck supple  No thyromegaly present  Cardiovascular: Normal rate, regular rhythm and normal heart sounds  Pulses are no weak pulses  Pulses:       Dorsalis pedis pulses are 2+ on the right side, and 2+ on the left side  Posterior tibial pulses are 2+ on the right side, and 2+ on the left side  Pulmonary/Chest: Effort normal and breath sounds normal  No respiratory distress  She has no wheezes  She has no rales  Abdominal: Soft  Bowel sounds are normal  She exhibits no distension  There is no tenderness  Musculoskeletal: Normal range of motion  She exhibits no edema  Feet:   Right Foot:   Skin Integrity: Negative for ulcer, skin breakdown, erythema, warmth, callus or dry skin     Left Foot:   Skin Integrity: Negative for ulcer, skin breakdown, erythema, warmth, callus or dry skin  Neurological: She is alert and oriented to person, place, and time  tremor   Skin: Skin is warm and dry  Psychiatric: She has a normal mood and affect  Vitals reviewed  Patient's shoes and socks removed  Right Foot/Ankle   Right Foot Inspection  Skin Exam: skin normal and skin intact no dry skin, no warmth, no callus, no erythema, no maceration, no abnormal color, no pre-ulcer, no ulcer and no callus                          Toe Exam: ROM and strength within normal limitsno swelling, no tenderness, erythema and  no right toe deformity  Sensory       Monofilament testing: intact  Vascular  Capillary refills: < 3 seconds  The right DP pulse is 2+  The right PT pulse is 2+  Left Foot/Ankle  Left Foot Inspection  Skin Exam: skin normal and skin intactno dry skin, no warmth, no erythema, no maceration, normal color, no pre-ulcer, no ulcer and no callus                         Toe Exam: ROM and strength within normal limitsno swelling, no tenderness, no erythema and no left toe deformity                   Sensory       Monofilament: intact  Vascular  Capillary refills: < 3 seconds  The left DP pulse is 2+  The left PT pulse is 2+  Assign Risk Category:  No deformity present; No loss of protective sensation;  No weak pulses       Risk: 0      Labs:   Lab Results   Component Value Date    HGBA1C 8 4 (H) 01/30/2020    HGBA1C 9 1 (H) 10/28/2019    HGBA1C 8 9 (H) 06/25/2019     Lab Results   Component Value Date    CREATININE 0 83 01/30/2020    CREATININE 0 99 10/28/2019    CREATININE 0 82 06/25/2019    BUN 19 01/30/2020     09/18/2015    K 4 1 01/30/2020     01/30/2020    CO2 28 01/30/2020     eGFR   Date Value Ref Range Status   01/30/2020 70 ml/min/1 73sq m Final     Lab Results   Component Value Date    CHOL 119 03/26/2015    HDL 40 07/12/2018    TRIG 269 (H) 07/12/2018     Lab Results   Component Value Date    ALT 23 01/30/2020    AST 16 01/30/2020    ALKPHOS 83 01/30/2020    BILITOT 0 61 09/18/2015     Lab Results   Component Value Date    UCW2WUZPWSCY 1 670 01/30/2020    ZZE7XGANMXEX 1 300 06/27/2017    VRD6ITMSPZDY 2 180 03/24/2017     Lab Results   Component Value Date    FREET4 0 99 01/30/2020       Impression & Plan:    Problem List Items Addressed This Visit        Endocrine    Thyroid nodule     Will monitor with ultrasound  Relevant Orders    US thyroid    Type 2 diabetes mellitus with hyperglycemia, with long-term current use of insulin ([address] ) - Primary       Poorly controlled with A1C of 8 4  Morning BG readings are generally within target likely having post prandial hyperglycemia  She has been using humalog with dinner only  Discussed option of adding SGLT2 inhibitor, GLP-1 agonist, or increasing Humalog dosing to be taken with all meals  For now she would like to try weekly GLP-1 agonist and it appears that Trulicity is on formulary  This may help with her increased appetite  Start Trulicity 2 24FP weekly  Reviewed risks and side effects  For now stop humalog at dinner time but may need to resume  She is concerned about cost of medications and if trulicity is too expensive she will let us know  Send BG log for review in two weeks  She declines f/u with dietician, will plan to resume walking on treadmill  Relevant Medications    Blood Glucose Monitoring Suppl (ONETOUCH VERIO) w/Device KIT    Dulaglutide (TRULICITY) 8 17 TB/4 2ZM SOPN    glimepiride (AMARYL) 2 mg tablet    Other Relevant Orders    Hemoglobin A1C       Cardiovascular and Mediastinum    Hypertension     Not at goal today 140/74- she reports home readings are lower  Will monitor and adjust medications if needed  Other    Hyperlipidemia     Continue statin  Microalbuminuria     Continue ARB            Vitamin D deficiency     Advised her to increase supplements to 2,000 units daily- she will do this but currently off all vitamins for urology testing  Relevant Medications    Cholecalciferol (VITAMIN D3) 50 MCG (2000 UT) capsule          Orders Placed This Encounter   Procedures    US thyroid     Standing Status:   Future     Standing Expiration Date:   2/10/2021     Scheduling Instructions:      No prep required  Please bring your physician order, insurance cards, a form of photo ID and a list of your medications with you  Arrive 15 minutes prior to your appointment time in order to register  To schedule this appointment, please contact central scheduling at [phone]     Order Specific Question:   Reason for Exam:     Answer:   Thyroid disorder    Hemoglobin A1C     Standing Status:   Future     Standing Expiration Date:   2/10/2021       There are no Patient Instructions on file for this visit  Discussed with the patient and all questioned fully answered  She will call me if any problems arise  Follow-up appointment in 3 months       Counseled patient on diagnostic results, prognosis, risk and benefit of treatment options, instruction for management, importance of treatment compliance, Risk  factor reduction and impressions    Dariel Hamman, PA-C

## 2020-02-10 NOTE — ASSESSMENT & PLAN NOTE
Advised her to increase supplements to 2,000 units daily- she will do this but currently off all vitamins for urology testing

## 2020-02-10 NOTE — ASSESSMENT & PLAN NOTE
Not at goal today 140/74- she reports home readings are lower  Will monitor and adjust medications if needed

## 2020-02-10 NOTE — ASSESSMENT & PLAN NOTE
Poorly controlled with A1C of 8 4  Morning BG readings are generally within target likely having post prandial hyperglycemia  She has been using humalog with dinner only  Discussed option of adding SGLT2 inhibitor, GLP-1 agonist, or increasing Humalog dosing to be taken with all meals  For now she would like to try weekly GLP-1 agonist and it appears that Trulicity is on formulary  This may help with her increased appetite  Start Trulicity 6 41SD weekly  Reviewed risks and side effects  For now stop humalog at dinner time but may need to resume  She is concerned about cost of medications and if trulicity is too expensive she will let us know  Send BG log for review in two weeks  She declines f/u with dietician, will plan to resume walking on treadmill

## 2020-03-02 ENCOUNTER — APPOINTMENT (OUTPATIENT)
Dept: LAB | Age: 75
End: 2020-03-02
Payer: MEDICARE

## 2020-03-02 ENCOUNTER — TRANSCRIBE ORDERS (OUTPATIENT)
Dept: ADMINISTRATIVE | Age: 75
End: 2020-03-02

## 2020-03-02 DIAGNOSIS — N25.81 SECONDARY HYPERPARATHYROIDISM OF RENAL ORIGIN (HCC): ICD-10-CM

## 2020-03-02 DIAGNOSIS — N20.0 URIC ACID NEPHROLITHIASIS: ICD-10-CM

## 2020-03-02 DIAGNOSIS — N25.81 SECONDARY HYPERPARATHYROIDISM OF RENAL ORIGIN (HCC): Primary | ICD-10-CM

## 2020-03-02 LAB
25(OH)D3 SERPL-MCNC: 24 NG/ML (ref 30–100)
CA-I BLD-SCNC: 1.28 MMOL/L (ref 1.12–1.32)
PTH-INTACT SERPL-MCNC: 63.9 PG/ML (ref 18.4–80.1)
URATE SERPL-MCNC: 4.1 MG/DL (ref 2–6.8)

## 2020-03-02 PROCEDURE — 84550 ASSAY OF BLOOD/URIC ACID: CPT

## 2020-03-02 PROCEDURE — 82330 ASSAY OF CALCIUM: CPT

## 2020-03-02 PROCEDURE — 83970 ASSAY OF PARATHORMONE: CPT

## 2020-03-02 PROCEDURE — 36415 COLL VENOUS BLD VENIPUNCTURE: CPT

## 2020-03-02 PROCEDURE — 82306 VITAMIN D 25 HYDROXY: CPT

## 2020-03-26 ENCOUNTER — PATIENT OUTREACH (OUTPATIENT)
Dept: CASE MANAGEMENT | Facility: OTHER | Age: 75
End: 2020-03-26

## 2020-04-01 ENCOUNTER — TELEPHONE (OUTPATIENT)
Dept: ENDOCRINOLOGY | Facility: CLINIC | Age: 75
End: 2020-04-01

## 2020-04-15 ENCOUNTER — TELEPHONE (OUTPATIENT)
Dept: ENDOCRINOLOGY | Facility: CLINIC | Age: 75
End: 2020-04-15

## 2020-05-08 ENCOUNTER — TELEPHONE (OUTPATIENT)
Dept: ENDOCRINOLOGY | Facility: CLINIC | Age: 75
End: 2020-05-08

## 2020-05-12 ENCOUNTER — APPOINTMENT (OUTPATIENT)
Dept: LAB | Age: 75
End: 2020-05-12
Payer: MEDICARE

## 2020-05-12 DIAGNOSIS — E11.65 TYPE 2 DIABETES MELLITUS WITH HYPERGLYCEMIA, WITH LONG-TERM CURRENT USE OF INSULIN (HCC): ICD-10-CM

## 2020-05-12 DIAGNOSIS — Z79.4 TYPE 2 DIABETES MELLITUS WITH HYPERGLYCEMIA, WITH LONG-TERM CURRENT USE OF INSULIN (HCC): ICD-10-CM

## 2020-05-12 LAB
EST. AVERAGE GLUCOSE BLD GHB EST-MCNC: 171 MG/DL
HBA1C MFR BLD: 7.6 %

## 2020-05-12 PROCEDURE — 36415 COLL VENOUS BLD VENIPUNCTURE: CPT

## 2020-05-12 PROCEDURE — 83036 HEMOGLOBIN GLYCOSYLATED A1C: CPT

## 2020-05-13 ENCOUNTER — TELEPHONE (OUTPATIENT)
Dept: ENDOCRINOLOGY | Facility: CLINIC | Age: 75
End: 2020-05-13

## 2020-05-15 ENCOUNTER — TELEMEDICINE (OUTPATIENT)
Dept: ENDOCRINOLOGY | Facility: CLINIC | Age: 75
End: 2020-05-15
Payer: MEDICARE

## 2020-05-15 VITALS — HEIGHT: 62 IN | BODY MASS INDEX: 30.36 KG/M2 | WEIGHT: 165 LBS

## 2020-05-15 DIAGNOSIS — E55.9 VITAMIN D DEFICIENCY: ICD-10-CM

## 2020-05-15 DIAGNOSIS — R80.9 MICROALBUMINURIA: ICD-10-CM

## 2020-05-15 DIAGNOSIS — I10 ESSENTIAL HYPERTENSION: ICD-10-CM

## 2020-05-15 DIAGNOSIS — E78.5 HYPERLIPIDEMIA, UNSPECIFIED HYPERLIPIDEMIA TYPE: ICD-10-CM

## 2020-05-15 DIAGNOSIS — Z79.4 TYPE 2 DIABETES MELLITUS WITH HYPERGLYCEMIA, WITH LONG-TERM CURRENT USE OF INSULIN (HCC): Primary | ICD-10-CM

## 2020-05-15 DIAGNOSIS — E04.1 THYROID NODULE: ICD-10-CM

## 2020-05-15 DIAGNOSIS — E11.65 TYPE 2 DIABETES MELLITUS WITH HYPERGLYCEMIA, WITH LONG-TERM CURRENT USE OF INSULIN (HCC): Primary | ICD-10-CM

## 2020-05-15 PROCEDURE — 99442 PR PHYS/QHP TELEPHONE EVALUATION 11-20 MIN: CPT | Performed by: INTERNAL MEDICINE

## 2020-06-15 DIAGNOSIS — Z79.4 TYPE 2 DIABETES MELLITUS WITH HYPERGLYCEMIA, WITH LONG-TERM CURRENT USE OF INSULIN (HCC): Primary | ICD-10-CM

## 2020-06-15 DIAGNOSIS — E11.65 TYPE 2 DIABETES MELLITUS WITH HYPERGLYCEMIA, WITH LONG-TERM CURRENT USE OF INSULIN (HCC): Primary | ICD-10-CM

## 2020-06-16 RX ORDER — BLOOD SUGAR DIAGNOSTIC
1 STRIP MISCELLANEOUS 3 TIMES DAILY
Qty: 300 EACH | Refills: 1 | Status: SHIPPED | OUTPATIENT
Start: 2020-06-16 | End: 2021-05-04 | Stop reason: SDUPTHER

## 2020-07-20 ENCOUNTER — TELEPHONE (OUTPATIENT)
Dept: ENDOCRINOLOGY | Facility: CLINIC | Age: 75
End: 2020-07-20

## 2020-07-20 DIAGNOSIS — Z79.4 TYPE 2 DIABETES MELLITUS WITH HYPERGLYCEMIA, WITH LONG-TERM CURRENT USE OF INSULIN (HCC): ICD-10-CM

## 2020-07-20 DIAGNOSIS — E11.65 TYPE 2 DIABETES MELLITUS WITH HYPERGLYCEMIA, WITH LONG-TERM CURRENT USE OF INSULIN (HCC): ICD-10-CM

## 2020-07-20 NOTE — TELEPHONE ENCOUNTER
Is she still taking Lantus 40 units daily?   IF so, reduce to 34 units daily  Send BG log in 1 week or ASAP if still having lows less than 80

## 2020-07-20 NOTE — TELEPHONE ENCOUNTER
Called pt and is aware to lower her dose of lantus to 34 units and to send in blood sugars in 1 week but sooner if blood sugars less than 80

## 2020-07-20 NOTE — TELEPHONE ENCOUNTER
Pt said her blood sugars have been running low in am in 80 and 70 and now it is running in the 60's for like a week, she has to keep eating high to get it up in am

## 2020-07-30 PROBLEM — H61.23 BILATERAL IMPACTED CERUMEN: Status: ACTIVE | Noted: 2020-07-30

## 2020-07-30 PROBLEM — H90.3 SENSORINEURAL HEARING LOSS (SNHL), BILATERAL: Status: ACTIVE | Noted: 2020-07-30

## 2020-08-17 ENCOUNTER — TELEPHONE (OUTPATIENT)
Dept: ENDOCRINOLOGY | Facility: CLINIC | Age: 75
End: 2020-08-17

## 2020-08-18 ENCOUNTER — TELEPHONE (OUTPATIENT)
Dept: ENDOCRINOLOGY | Facility: CLINIC | Age: 75
End: 2020-08-18

## 2020-08-18 NOTE — TELEPHONE ENCOUNTER
Due to low blood sugars in the morning, decrease Lantus to 20 units  Send blood sugar log in two weeks

## 2020-08-25 ENCOUNTER — LAB (OUTPATIENT)
Dept: LAB | Age: 75
End: 2020-08-25
Payer: MEDICARE

## 2020-08-25 DIAGNOSIS — E11.65 TYPE 2 DIABETES MELLITUS WITH HYPERGLYCEMIA, WITH LONG-TERM CURRENT USE OF INSULIN (HCC): ICD-10-CM

## 2020-08-25 DIAGNOSIS — Z79.4 TYPE 2 DIABETES MELLITUS WITH HYPERGLYCEMIA, WITH LONG-TERM CURRENT USE OF INSULIN (HCC): ICD-10-CM

## 2020-08-25 LAB
ALBUMIN SERPL BCP-MCNC: 3.5 G/DL (ref 3.5–5)
ALP SERPL-CCNC: 68 U/L (ref 46–116)
ALT SERPL W P-5'-P-CCNC: 20 U/L (ref 12–78)
ANION GAP SERPL CALCULATED.3IONS-SCNC: 6 MMOL/L (ref 4–13)
AST SERPL W P-5'-P-CCNC: 12 U/L (ref 5–45)
BILIRUB SERPL-MCNC: 0.6 MG/DL (ref 0.2–1)
BUN SERPL-MCNC: 19 MG/DL (ref 5–25)
CALCIUM SERPL-MCNC: 9.4 MG/DL (ref 8.3–10.1)
CHLORIDE SERPL-SCNC: 105 MMOL/L (ref 100–108)
CHOLEST SERPL-MCNC: 116 MG/DL (ref 50–200)
CO2 SERPL-SCNC: 28 MMOL/L (ref 21–32)
CREAT SERPL-MCNC: 0.76 MG/DL (ref 0.6–1.3)
EST. AVERAGE GLUCOSE BLD GHB EST-MCNC: 160 MG/DL
GFR SERPL CREATININE-BSD FRML MDRD: 78 ML/MIN/1.73SQ M
GLUCOSE P FAST SERPL-MCNC: 122 MG/DL (ref 65–99)
HBA1C MFR BLD: 7.2 %
HDLC SERPL-MCNC: 38 MG/DL
LDLC SERPL CALC-MCNC: 37 MG/DL (ref 0–100)
NONHDLC SERPL-MCNC: 78 MG/DL
POTASSIUM SERPL-SCNC: 3.8 MMOL/L (ref 3.5–5.3)
PROT SERPL-MCNC: 7.3 G/DL (ref 6.4–8.2)
SODIUM SERPL-SCNC: 139 MMOL/L (ref 136–145)
TRIGL SERPL-MCNC: 205 MG/DL

## 2020-08-25 PROCEDURE — 83036 HEMOGLOBIN GLYCOSYLATED A1C: CPT

## 2020-08-25 PROCEDURE — 80053 COMPREHEN METABOLIC PANEL: CPT

## 2020-08-25 PROCEDURE — 80061 LIPID PANEL: CPT

## 2020-08-25 PROCEDURE — 36415 COLL VENOUS BLD VENIPUNCTURE: CPT

## 2020-08-26 NOTE — RESULT ENCOUNTER NOTE
Please call the patient regarding her abnormal result  Diabetes remains under good control  The triglyceride is elevated  Discuss further at the visit tomorrow

## 2020-08-27 ENCOUNTER — TELEMEDICINE (OUTPATIENT)
Dept: ENDOCRINOLOGY | Facility: CLINIC | Age: 75
End: 2020-08-27
Payer: MEDICARE

## 2020-08-27 DIAGNOSIS — E78.5 HYPERLIPIDEMIA, UNSPECIFIED HYPERLIPIDEMIA TYPE: ICD-10-CM

## 2020-08-27 DIAGNOSIS — E11.65 TYPE 2 DIABETES MELLITUS WITH HYPERGLYCEMIA, WITH LONG-TERM CURRENT USE OF INSULIN (HCC): Primary | ICD-10-CM

## 2020-08-27 DIAGNOSIS — E04.1 THYROID NODULE: ICD-10-CM

## 2020-08-27 DIAGNOSIS — E55.9 VITAMIN D DEFICIENCY: ICD-10-CM

## 2020-08-27 DIAGNOSIS — I10 ESSENTIAL HYPERTENSION: ICD-10-CM

## 2020-08-27 DIAGNOSIS — Z79.4 TYPE 2 DIABETES MELLITUS WITH HYPERGLYCEMIA, WITH LONG-TERM CURRENT USE OF INSULIN (HCC): Primary | ICD-10-CM

## 2020-08-27 PROCEDURE — 99442 PR PHYS/QHP TELEPHONE EVALUATION 11-20 MIN: CPT | Performed by: PHYSICIAN ASSISTANT

## 2020-08-27 RX ORDER — INSULIN GLARGINE 100 [IU]/ML
20 INJECTION, SOLUTION SUBCUTANEOUS
Qty: 5 PEN
Start: 2020-08-27 | End: 2020-11-23 | Stop reason: SDUPTHER

## 2020-08-27 NOTE — ASSESSMENT & PLAN NOTE
She has order for ultrasound but hasn't completed yet due to concerns about COVID-19  Advised her to complete the ultrasound when comfortable on a non-urgent basis  Epidermal Closure: running

## 2020-08-27 NOTE — ASSESSMENT & PLAN NOTE
Control has improved significantly  Hypoglycemia has improved after lantus was recently reduced to 20 units daily  Discussed option of increasing trulicity to 3 2XW weekly and make further reduction/discontinuation of lantus- she just filled a supply of lantus so no interested in changing regimen right now but will let us know as she is near end of supply of lantus  Continue current regimen     Lab Results   Component Value Date    HGBA1C 7 2 (H) 08/25/2020

## 2020-08-27 NOTE — PROGRESS NOTES
Virtual Regular Visit      Assessment/Plan:    Problem List Items Addressed This Visit        Endocrine    Thyroid nodule     She has order for ultrasound but hasn't completed yet due to concerns about COVID-19  Advised her to complete the ultrasound when comfortable on a non-urgent basis  Type 2 diabetes mellitus with hyperglycemia, with long-term current use of insulin (HCC) - Primary     Control has improved significantly  Hypoglycemia has improved after lantus was recently reduced to 20 units daily  Discussed option of increasing trulicity to 5 8EH weekly and make further reduction/discontinuation of lantus- she just filled a supply of lantus so no interested in changing regimen right now but will let us know as she is near end of supply of lantus  Continue current regimen  Lab Results   Component Value Date    HGBA1C 7 2 (H) 08/25/2020            Relevant Medications    insulin glargine (Lantus SoloStar) 100 units/mL injection pen    Other Relevant Orders    Hemoglobin A1C    Comprehensive metabolic panel    Microalbumin / creatinine urine ratio       Cardiovascular and Mediastinum    Hypertension     Advised her to check BP at home and contact us with the results so we can determine if medication adjustment is necessary  Other    Hyperlipidemia     Continue Atorvastatin         Vitamin D deficiency     Continue supplements  Relevant Orders    Vitamin D 25 hydroxy               Reason for visit is   Chief Complaint   Patient presents with    Virtual Regular Visit        Encounter provider Radha Brunson PA-C    Provider located at [address] [facility] [address]  [address] [place] [address] [id number] [place] [id number]      Recent Visits  No visits were found meeting these conditions     Showing recent visits within past 7 days and meeting all other requirements     Today's Visits  Date Type Provider Dept   08/27/20 Telemedicine Torres Limon PA-C [facility] [address] [facility] today's visits and meeting all other requirements     Future Appointments  No visits were found meeting these conditions  Showing future appointments within next 150 days and meeting all other requirements        The patient was identified by name and date of birth  Geo Deras was informed that this is a telemedicine visit and that the visit is being conducted through telephone  My office door was closed  No one else was in the room  She acknowledged consent and understanding of privacy and security of the video platform  The patient has agreed to participate and understands they can discontinue the visit at any time  It was my intent to perform this visit via video technology but the patient was not able to do a video connection so the visit was completed via audio telephone only  Patient is aware this is a billable service  Subjective  Geo Deras is a 77 y o  female with a history of type 2 diabetes with long term use of insulin since many years ago  Reports complications of microalbuminuiria  Since last visit, she was experiencing morning hypoglycemia and Lantus was recently reduced to 20 units daily and this has improved  Blood sugars have mostly been in the low 100s, one reading a little higher around 155 after eating chinese food the night before  She has done a good job with weight loss       Current regimen:   Metformin 1000mg twice per day   Lantus 20 units bedtime  Glimepiride 2mg breakfast and dinner     Last Eye Exam: UTD   Last Foot Exam: UTD     Has hypertension: Taking losartan HCT  Has hyperlipidemia: Taking atorvastatin          Thyroid nodule 2016 no dysphagia  No FH thyroid CA, no rad  Hasn't completed repeat ultrasound due to not feeling comfortable with covid-19         HPI     Past Medical History:   Diagnosis Date    [facility]     [facility]     Disease of thyroid gland     DVT, bilateral lower limbs (HCC)     Hyperlipidemia     Hypertension     Hypertension     Kidney stones     Renal disorder        Past Surgical History:   Procedure Laterality Date    HYSTERECTOMY      partial       Current Outpatient Medications   Medication Sig Dispense Refill    apixaban (ELIQUIS) 5 mg Take 5 mg by mouth 2 (two) times a day      atorvastatin (LIPITOR) 20 mg tablet Take by mouth      Blood Glucose Monitoring Suppl (ONETOUCH VERIO) w/Device KIT Use to check BG  DX E11 9, patient will check blood sugars 3 times daily 1 kit 1    Cholecalciferol (VITAMIN D3) 50 MCG (2000 UT) capsule Take 1 capsule (2,000 Units total) by mouth daily 30 capsule 5    Dulaglutide (Trulicity) 7 88 RI/7 9VT SOPN Inject 0 5 mL (0 75 mg total) under the skin once a week 4 pen 5    glimepiride (AMARYL) 2 mg tablet Take 1 tablet (2 mg total) by mouth 2 (two) times a day 180 tablet 3    insulin glargine (Lantus SoloStar) 100 units/mL injection pen Inject 20 Units under the skin daily at bedtime for 20 days 5 pen     latanoprost (XALATAN) 0 005 % ophthalmic solution Administer 1 drop to both eyes daily at bedtime      losartan-hydrochlorothiazide (HYZAAR) 100-12 5 MG per tablet Take by mouth      metFORMIN (GLUCOPHAGE) 1000 MG tablet Take 1,000 mg by mouth 2 (two) times a day with meals       ONETOUCH DELICA LANCETS 34M MISC TEST BLOOD SUGARS THREE TIMES DAILY      ONETOUCH VERIO test strip 1 each by Other route 3 (three) times a day 300 each 1    propranolol (INDERAL) 20 mg tablet Take by mouth       No current facility-administered medications for this visit  Allergies   Allergen Reactions    Sulfa Antibiotics Rash       Review of Systems   Constitutional: Negative for activity change, appetite change, chills, diaphoresis, fatigue, fever and unexpected weight change  HENT: Negative for trouble swallowing and voice change  Eyes: Negative for visual disturbance     Respiratory: Negative for shortness of breath  Cardiovascular: Negative for chest pain and palpitations  Gastrointestinal: Negative for abdominal pain, constipation and diarrhea  Endocrine: Negative for cold intolerance, heat intolerance, polydipsia, polyphagia and polyuria  Genitourinary: Negative for frequency and menstrual problem  Musculoskeletal: Negative for arthralgias and myalgias  Skin: Negative for rash  Allergic/Immunologic: Negative for food allergies  Neurological: Negative for dizziness and tremors  Hematological: Negative for adenopathy  Psychiatric/Behavioral: Negative for sleep disturbance  All other systems reviewed and are negative  Video Exam    There were no vitals filed for this visit  Physical Exam   Unable by phone    Component      Latest Ref Rng & Units 3/2/2020 3/2/2020 3/2/2020           8:43 AM  8:43 AM  8:43 AM   Sodium      136 - 145 mmol/L      Potassium      3 5 - 5 3 mmol/L      Chloride      100 - 108 mmol/L      CO2      21 - 32 mmol/L      Anion Gap      4 - 13 mmol/L      BUN      5 - 25 mg/dL      Creatinine      0 60 - 1 30 mg/dL      GLUCOSE FASTING      65 - 99 mg/dL      Calcium      8 3 - 10 1 mg/dL      AST      5 - 45 U/L      ALT      12 - 78 U/L      Alkaline Phosphatase      46 - 116 U/L      Total Protein      6 4 - 8 2 g/dL      Albumin      3 5 - 5 0 g/dL      TOTAL BILIRUBIN      0 20 - 1 00 mg/dL      eGFR      ml/min/1 73sq m      Cholesterol      50 - 200 mg/dL      Triglycerides      <=150 mg/dL      HDL      >=40 mg/dL      LDL Calculated      0 - 100 mg/dL      Non-HDL Cholesterol      mg/dl      Hemoglobin A1C      Normal 3 8-5 6%; PreDiabetic 5 7-6 4%;  Diabetic >=6 5%; Glycemic control for adults with diabetes <7 0% %      eAG, EST AVG Glucose      mg/dl      PARATHYROID HORMONE      18 4 - 80 1 pg/mL   63 9   URIC ACID      2 0 - 6 8 mg/dL  4 1    Calcium, Ionized      1 12 - 1 32 mmol/L 1 28     Vit D, 25-Hydroxy      30 0 - 100 0 ng/mL Component      Latest Ref Rng & Units 3/2/2020 5/12/2020 8/25/2020           8:43 AM  9:22 AM  8:31 AM   Sodium      136 - 145 mmol/L   139   Potassium      3 5 - 5 3 mmol/L   3 8   Chloride      100 - 108 mmol/L   105   CO2      21 - 32 mmol/L   28   Anion Gap      4 - 13 mmol/L   6   BUN      5 - 25 mg/dL   19   Creatinine      0 60 - 1 30 mg/dL   0 76   GLUCOSE FASTING      65 - 99 mg/dL   122 (H)   Calcium      8 3 - 10 1 mg/dL   9 4   AST      5 - 45 U/L   12   ALT      12 - 78 U/L   20   Alkaline Phosphatase      46 - 116 U/L   68   Total Protein      6 4 - 8 2 g/dL   7 3   Albumin      3 5 - 5 0 g/dL   3 5   TOTAL BILIRUBIN      0 20 - 1 00 mg/dL   0 60   eGFR      ml/min/1 73sq m   78   Cholesterol      50 - 200 mg/dL      Triglycerides      <=150 mg/dL      HDL      >=40 mg/dL      LDL Calculated      0 - 100 mg/dL      Non-HDL Cholesterol      mg/dl      Hemoglobin A1C      Normal 3 8-5 6%; PreDiabetic 5 7-6 4%;  Diabetic >=6 5%; Glycemic control for adults with diabetes <7 0% %  7 6 (H)    eAG, EST AVG Glucose      mg/dl  171    PARATHYROID HORMONE      18 4 - 80 1 pg/mL      URIC ACID      2 0 - 6 8 mg/dL      Calcium, Ionized      1 12 - 1 32 mmol/L      Vit D, 25-Hydroxy      30 0 - 100 0 ng/mL 24 0 (L)       Component      Latest Ref Rng & Units 8/25/2020 8/25/2020           8:31 AM  8:31 AM   Sodium      136 - 145 mmol/L     Potassium      3 5 - 5 3 mmol/L     Chloride      100 - 108 mmol/L     CO2      21 - 32 mmol/L     Anion Gap      4 - 13 mmol/L     BUN      5 - 25 mg/dL     Creatinine      0 60 - 1 30 mg/dL     GLUCOSE FASTING      65 - 99 mg/dL     Calcium      8 3 - 10 1 mg/dL     AST      5 - 45 U/L     ALT      12 - 78 U/L     Alkaline Phosphatase      46 - 116 U/L     Total Protein      6 4 - 8 2 g/dL     Albumin      3 5 - 5 0 g/dL     TOTAL BILIRUBIN      0 20 - 1 00 mg/dL     eGFR      ml/min/1 73sq m     Cholesterol      50 - 200 mg/dL 116    Triglycerides      <=150 mg/dL 205 (H)    HDL      >=40 mg/dL 38 (L)    LDL Calculated      0 - 100 mg/dL 37    Non-HDL Cholesterol      mg/dl 78    Hemoglobin A1C      Normal 3 8-5 6%; PreDiabetic 5 7-6 4%; Diabetic >=6 5%; Glycemic control for adults with diabetes <7 0% %  7 2 (H)   eAG, EST AVG Glucose      mg/dl  160   PARATHYROID HORMONE      18 4 - 80 1 pg/mL     URIC ACID      2 0 - 6 8 mg/dL     Calcium, Ionized      1 12 - 1 32 mmol/L     Vit D, 25-Hydroxy      30 0 - 100 0 ng/mL         I spent 15 minutes with patient today in which greater than 50% of the time was spent in counseling/coordination of care regarding medication options, treatment of hypglycemia, review of lab results      VIRTUAL VISIT DISCLAIMER    Rashida Nils acknowledges that she has consented to an online visit or consultation  She understands that the online visit is based solely on information provided by her, and that, in the absence of a face-to-face physical evaluation by the physician, the diagnosis she receives is both limited and provisional in terms of accuracy and completeness  This is not intended to replace a full medical face-to-face evaluation by the physician  Rashida Wray understands and accepts these terms

## 2020-08-27 NOTE — ASSESSMENT & PLAN NOTE
Advised her to check BP at home and contact us with the results so we can determine if medication adjustment is necessary

## 2020-09-08 ENCOUNTER — TELEPHONE (OUTPATIENT)
Dept: ENDOCRINOLOGY | Facility: CLINIC | Age: 75
End: 2020-09-08

## 2020-09-08 NOTE — TELEPHONE ENCOUNTER
There are a few lows, but insulin has been reduced since that time    Send a new log in the next few weeks or sooner if more lows less than 80

## 2020-09-25 ENCOUNTER — TELEPHONE (OUTPATIENT)
Dept: ENDOCRINOLOGY | Facility: CLINIC | Age: 75
End: 2020-09-25

## 2020-11-05 ENCOUNTER — TELEPHONE (OUTPATIENT)
Dept: ENDOCRINOLOGY | Facility: CLINIC | Age: 75
End: 2020-11-05

## 2020-11-23 DIAGNOSIS — E11.65 TYPE 2 DIABETES MELLITUS WITH HYPERGLYCEMIA, WITH LONG-TERM CURRENT USE OF INSULIN (HCC): ICD-10-CM

## 2020-11-23 DIAGNOSIS — Z79.4 TYPE 2 DIABETES MELLITUS WITH HYPERGLYCEMIA, WITH LONG-TERM CURRENT USE OF INSULIN (HCC): ICD-10-CM

## 2020-11-23 RX ORDER — INSULIN GLARGINE 100 [IU]/ML
20 INJECTION, SOLUTION SUBCUTANEOUS
Qty: 6 PEN | Refills: 1 | Status: SHIPPED | OUTPATIENT
Start: 2020-11-23 | End: 2020-11-27 | Stop reason: SDUPTHER

## 2020-11-23 RX ORDER — GLIMEPIRIDE 2 MG/1
TABLET ORAL
Qty: 180 TABLET | Refills: 3 | Status: SHIPPED | OUTPATIENT
Start: 2020-11-23 | End: 2021-06-22 | Stop reason: SDUPTHER

## 2020-11-27 DIAGNOSIS — E11.65 TYPE 2 DIABETES MELLITUS WITH HYPERGLYCEMIA, WITH LONG-TERM CURRENT USE OF INSULIN (HCC): ICD-10-CM

## 2020-11-27 DIAGNOSIS — Z79.4 TYPE 2 DIABETES MELLITUS WITH HYPERGLYCEMIA, WITH LONG-TERM CURRENT USE OF INSULIN (HCC): ICD-10-CM

## 2020-11-27 RX ORDER — INSULIN GLARGINE 100 [IU]/ML
INJECTION, SOLUTION SUBCUTANEOUS
Qty: 10 PEN | Refills: 1 | Status: SHIPPED | OUTPATIENT
Start: 2020-11-27 | End: 2021-01-13 | Stop reason: SDUPTHER

## 2020-12-10 DIAGNOSIS — Z79.4 TYPE 2 DIABETES MELLITUS WITH HYPERGLYCEMIA, WITH LONG-TERM CURRENT USE OF INSULIN (HCC): ICD-10-CM

## 2020-12-10 DIAGNOSIS — E11.65 TYPE 2 DIABETES MELLITUS WITH HYPERGLYCEMIA, WITH LONG-TERM CURRENT USE OF INSULIN (HCC): ICD-10-CM

## 2020-12-11 RX ORDER — DULAGLUTIDE 0.75 MG/.5ML
0.75 INJECTION, SOLUTION SUBCUTANEOUS WEEKLY
Qty: 4 PEN | Refills: 5 | Status: SHIPPED | OUTPATIENT
Start: 2020-12-11 | End: 2021-06-22 | Stop reason: SDUPTHER

## 2020-12-14 ENCOUNTER — TELEPHONE (OUTPATIENT)
Dept: ENDOCRINOLOGY | Facility: CLINIC | Age: 75
End: 2020-12-14

## 2021-01-07 ENCOUNTER — LAB (OUTPATIENT)
Dept: LAB | Age: 76
End: 2021-01-07
Payer: MEDICARE

## 2021-01-07 DIAGNOSIS — E55.9 VITAMIN D DEFICIENCY: ICD-10-CM

## 2021-01-07 DIAGNOSIS — E11.65 TYPE 2 DIABETES MELLITUS WITH HYPERGLYCEMIA, WITH LONG-TERM CURRENT USE OF INSULIN (HCC): ICD-10-CM

## 2021-01-07 DIAGNOSIS — Z79.4 TYPE 2 DIABETES MELLITUS WITH HYPERGLYCEMIA, WITH LONG-TERM CURRENT USE OF INSULIN (HCC): ICD-10-CM

## 2021-01-07 LAB
25(OH)D3 SERPL-MCNC: 37.3 NG/ML (ref 30–100)
ALBUMIN SERPL BCP-MCNC: 3.7 G/DL (ref 3.5–5)
ALP SERPL-CCNC: 75 U/L (ref 46–116)
ALT SERPL W P-5'-P-CCNC: 17 U/L (ref 12–78)
ANION GAP SERPL CALCULATED.3IONS-SCNC: 5 MMOL/L (ref 4–13)
AST SERPL W P-5'-P-CCNC: 12 U/L (ref 5–45)
BILIRUB SERPL-MCNC: 0.6 MG/DL (ref 0.2–1)
BUN SERPL-MCNC: 15 MG/DL (ref 5–25)
CALCIUM SERPL-MCNC: 10.8 MG/DL (ref 8.3–10.1)
CHLORIDE SERPL-SCNC: 105 MMOL/L (ref 100–108)
CO2 SERPL-SCNC: 28 MMOL/L (ref 21–32)
CREAT SERPL-MCNC: 0.74 MG/DL (ref 0.6–1.3)
CREAT UR-MCNC: 62.8 MG/DL
EST. AVERAGE GLUCOSE BLD GHB EST-MCNC: 163 MG/DL
GFR SERPL CREATININE-BSD FRML MDRD: 80 ML/MIN/1.73SQ M
GLUCOSE P FAST SERPL-MCNC: 109 MG/DL (ref 65–99)
HBA1C MFR BLD: 7.3 %
MICROALBUMIN UR-MCNC: 190 MG/L (ref 0–20)
MICROALBUMIN/CREAT 24H UR: 303 MG/G CREATININE (ref 0–30)
POTASSIUM SERPL-SCNC: 4 MMOL/L (ref 3.5–5.3)
PROT SERPL-MCNC: 7.7 G/DL (ref 6.4–8.2)
SODIUM SERPL-SCNC: 138 MMOL/L (ref 136–145)

## 2021-01-07 PROCEDURE — 82043 UR ALBUMIN QUANTITATIVE: CPT

## 2021-01-07 PROCEDURE — 82570 ASSAY OF URINE CREATININE: CPT

## 2021-01-07 PROCEDURE — 83036 HEMOGLOBIN GLYCOSYLATED A1C: CPT

## 2021-01-07 PROCEDURE — 82306 VITAMIN D 25 HYDROXY: CPT

## 2021-01-07 PROCEDURE — 36415 COLL VENOUS BLD VENIPUNCTURE: CPT

## 2021-01-07 PROCEDURE — 80053 COMPREHEN METABOLIC PANEL: CPT

## 2021-01-11 ENCOUNTER — TELEPHONE (OUTPATIENT)
Dept: ENDOCRINOLOGY | Facility: CLINIC | Age: 76
End: 2021-01-11

## 2021-01-13 ENCOUNTER — TELEMEDICINE (OUTPATIENT)
Dept: ENDOCRINOLOGY | Facility: CLINIC | Age: 76
End: 2021-01-13
Payer: MEDICARE

## 2021-01-13 DIAGNOSIS — I10 ESSENTIAL HYPERTENSION: ICD-10-CM

## 2021-01-13 DIAGNOSIS — E78.5 HYPERLIPIDEMIA, UNSPECIFIED HYPERLIPIDEMIA TYPE: ICD-10-CM

## 2021-01-13 DIAGNOSIS — E83.52 HYPERCALCEMIA: Primary | ICD-10-CM

## 2021-01-13 DIAGNOSIS — Z79.4 TYPE 2 DIABETES MELLITUS WITH HYPERGLYCEMIA, WITH LONG-TERM CURRENT USE OF INSULIN (HCC): ICD-10-CM

## 2021-01-13 DIAGNOSIS — E55.9 VITAMIN D DEFICIENCY: ICD-10-CM

## 2021-01-13 DIAGNOSIS — R80.9 MICROALBUMINURIA: ICD-10-CM

## 2021-01-13 DIAGNOSIS — E11.65 TYPE 2 DIABETES MELLITUS WITH HYPERGLYCEMIA, WITH LONG-TERM CURRENT USE OF INSULIN (HCC): ICD-10-CM

## 2021-01-13 DIAGNOSIS — E04.1 THYROID NODULE: ICD-10-CM

## 2021-01-13 PROCEDURE — 99213 OFFICE O/P EST LOW 20 MIN: CPT | Performed by: PHYSICIAN ASSISTANT

## 2021-01-13 RX ORDER — ACETAMINOPHEN 160 MG
TABLET,DISINTEGRATING ORAL
Qty: 30 CAPSULE | Refills: 5
Start: 2021-01-13

## 2021-01-13 RX ORDER — INSULIN GLARGINE 100 [IU]/ML
INJECTION, SOLUTION SUBCUTANEOUS
Qty: 10 PEN | Refills: 1
Start: 2021-01-13 | End: 2021-03-24 | Stop reason: SDUPTHER

## 2021-01-13 NOTE — ASSESSMENT & PLAN NOTE
Control is stable with no hypoglycemia  Continue current regimen     Lab Results   Component Value Date    HGBA1C 7 3 (H) 01/07/2021

## 2021-01-13 NOTE — ASSESSMENT & PLAN NOTE
Calcium level high x 1  Repeat lab testing as ordered and keep well hydrated  Will order Bone density scan if labs indicate primary hyperparathyroidism  She reports normal dexa scan many years ago and prefers to delay any testing at this time due to Ana Laura

## 2021-01-13 NOTE — PROGRESS NOTES
Virtual Regular Visit      Assessment/Plan:    Problem List Items Addressed This Visit        Endocrine    Thyroid nodule     No dysphagia  On hold until Concerns about COVID  She has order and will schedule when comfortable  Type 2 diabetes mellitus with hyperglycemia, with long-term current use of insulin (HCC)     Control is stable with no hypoglycemia  Continue current regimen  Lab Results   Component Value Date    HGBA1C 7 3 (H) 01/07/2021            Relevant Medications    insulin glargine (Lantus SoloStar) 100 units/mL injection pen       Cardiovascular and Mediastinum    Hypertension     Well controlled according to home BP readings  Other    Hyperlipidemia     Continue atorvastatin  Microalbuminuria     Continue Losartan         Vitamin D deficiency    Relevant Medications    Cholecalciferol (Vitamin D3) 50 MCG (2000 UT) capsule    Hypercalcemia - Primary     Calcium level high x 1  Repeat lab testing as ordered and keep well hydrated  Will order Bone density scan if labs indicate primary hyperparathyroidism  She reports normal dexa scan many years ago and prefers to delay any testing at this time due to Ana Laura            Relevant Orders    Albumin Lab Collect    Calcium Lab Collect    Phosphorus Lab Collect    PTH, intact Lab Collect Lab Collect               Reason for visit is   Chief Complaint   Patient presents with    Virtual Regular Visit        Encounter provider Carmen Otoole PA-C    Provider located at [address] [place] [address] [address] [phone]      Recent Visits  Date Type Provider Dept   01/11/21 Telephone Carmen Otoole PA-C [facility] [address]   Showing recent visits within past 7 days and meeting all other requirements     Today's Visits  Date Type Provider Dept   01/13/21 Telemedicine Carmen Otoole PA-C [facility]   Showing today's visits and meeting all other requirements     Future Appointments  No visits were found meeting these conditions  Showing future appointments within next 150 days and meeting all other requirements        The patient was identified by name and date of birth  Maycol Milan was informed that this is a telemedicine visit and that the visit is being conducted through telephone  My office door was closed  No one else was in the room  She acknowledged consent and understanding of privacy and security of the video platform  The patient has agreed to participate and understands they can discontinue the visit at any time  It was my intent to perform this visit via video technology but the patient was not able to do a video connection so the visit was completed via audio telephone only  Patient is aware this is a billable service  Subjective  Maycol Milan is a 77 y o  female with history of Type 2 Diabetes, HTN, Hyperlipidemia, and Thyroid Nodules  For the Diabetes, she is taking meds as directed  She did send BG log for review and denies problems with her blood sugars  No hypoglycemia  She is usually checking bG 2x per day  Current Diabetes Regimen:   Trulicity 4 78SX weekly  Glimepiride 2mg twice per day   Lantus 25 daily QHS  Metfomin 1000mg twice per day     UTD on foot exam, no problems, checking at home daily  EYE exam UTD, 2/2020  BP has been good when checked 130/70s  Taking losartan HCTZ and propranolol  For hyperlipidemia, taking atorvastatin  For Vitamin D Deficiency, has been taking supplements 200 units twice per day     High calcium on recent labs  No previous calcium problems  May not keep very well hydrated  No kidney stones, not taking calcium supplements  Occasionally drinks milk  No FH calcium problems  Thyroid nodules, no dysphagia  Hasn't done repeat scan yet due to covid     Has [facility] ago, reportedly normal      HPI     Past Medical History:   Diagnosis Date    [facility] ([facility]     Diabetes mellitus [facility]     Disease of thyroid gland     DVT, bilateral lower limbs ([facility] )     Hyperlipidemia     Hypertension     Hypertension     Kidney stones     Renal disorder        Past Surgical History:   Procedure Laterality Date    HYSTERECTOMY      partial       Current Outpatient Medications   Medication Sig Dispense Refill    apixaban (ELIQUIS) 5 mg Take 5 mg by mouth 2 (two) times a day      atorvastatin (LIPITOR) 20 mg tablet Take by mouth      Blood Glucose Monitoring Suppl (ONETOUCH VERIO) w/Device KIT Use to check BG  DX E11 9, patient will check blood sugars 3 times daily 1 kit 1    Cholecalciferol (Vitamin D3) 50 MCG (2000 UT) capsule 2000 units twice per day 30 capsule 5    Dulaglutide (Trulicity) 8 74 FV/9 0RF SOPN Inject 0 5 mL (0 75 mg total) under the skin once a week 4 pen 5    glimepiride (AMARYL) 2 mg tablet TAKE 1 TABLET BY MOUTH TWO  TIMES DAILY 180 tablet 3    insulin glargine (Lantus SoloStar) 100 units/mL injection pen Use up to 25 units daily 10 pen 1    latanoprost (XALATAN) 0 005 % ophthalmic solution Administer 1 drop to both eyes daily at bedtime      losartan-hydrochlorothiazide (HYZAAR) 100-12 5 MG per tablet Take by mouth      metFORMIN (GLUCOPHAGE) 1000 MG tablet Take 1 tablet (1,000 mg total) by mouth 2 (two) times a day with meals 180 tablet 1    ONETOUCH DELICA LANCETS 44Q MISC TEST BLOOD SUGARS THREE TIMES DAILY      ONETOUCH VERIO test strip 1 each by Other route 3 (three) times a day 300 each 1    propranolol (INDERAL) 20 mg tablet Take by mouth       No current facility-administered medications for this visit  Allergies   Allergen Reactions    Sulfa Antibiotics Rash       Review of Systems   Constitutional: Negative for activity change, appetite change, chills, diaphoresis, fatigue, fever and unexpected weight change     HENT: Negative for trouble swallowing and voice change  Eyes: Negative for visual disturbance  Respiratory: Negative for shortness of breath  Cardiovascular: Negative for chest pain and palpitations  Gastrointestinal: Negative for abdominal pain, constipation and diarrhea  Endocrine: Negative for cold intolerance, heat intolerance, polydipsia, polyphagia and polyuria  Genitourinary: Negative for frequency and menstrual problem  Musculoskeletal: Positive for arthralgias (bursitis  ) and back pain (sciatica  )  Negative for myalgias  Skin: Negative for rash  Allergic/Immunologic: Negative for food allergies  Neurological: Negative for dizziness and tremors  Hematological: Negative for adenopathy  Psychiatric/Behavioral: Negative for sleep disturbance  All other systems reviewed and are negative  Video Exam    There were no vitals filed for this visit      Physical Exam   Unable by phone  Component      Latest Ref Rng & Units 8/25/2020 1/7/2021   Sodium      136 - 145 mmol/L 139 138   Potassium      3 5 - 5 3 mmol/L 3 8 4 0   Chloride      100 - 108 mmol/L 105 105   CO2      21 - 32 mmol/L 28 28   Anion Gap      4 - 13 mmol/L 6 5   BUN      5 - 25 mg/dL 19 15   Creatinine      0 60 - 1 30 mg/dL 0 76 0 74   GLUCOSE FASTING      65 - 99 mg/dL 122 (H) 109 (H)   Calcium      8 3 - 10 1 mg/dL 9 4 10 8 (H)   AST      5 - 45 U/L 12 12   ALT      12 - 78 U/L 20 17   Alkaline Phosphatase      46 - 116 U/L 68 75   Total Protein      6 4 - 8 2 g/dL 7 3 7 7   Albumin      3 5 - 5 0 g/dL 3 5 3 7   TOTAL BILIRUBIN      0 20 - 1 00 mg/dL 0 60 0 60   eGFR      ml/min/1 73sq m 78 80   Cholesterol      50 - 200 mg/dL 116    Triglycerides      <=150 mg/dL 205 (H)    HDL      >=40 mg/dL 38 (L)    LDL Calculated      0 - 100 mg/dL 37    Non-HDL Cholesterol      mg/dl 78    EXT Creatinine Urine      mg/dL  62 8   MICROALBUM ,U,RANDOM      0 0 - 20 0 mg/L  190 0 (H)   MICROALBUMIN/CREATININE RATIO      0 - 30 mg/g creatinine  303 (H) Hemoglobin A1C      Normal 3 8-5 6%; PreDiabetic 5 7-6 4%; Diabetic >=6 5%; Glycemic control for adults with diabetes <7 0% % 7 2 (H) 7 3 (H)   eAG, EST AVG Glucose      mg/dl 160 163   Vit D, 25-Hydroxy      30 0 - 100 0 ng/mL  37 3     I spent 15 minutes with patient today in which greater than 50% of the time was spent in counseling/coordination of care regarding reviewing medication list, medical history, and BG readings  VIRTUAL VISIT DISCLAIMER    Leif Prince acknowledges that she has consented to an online visit or consultation  She understands that the online visit is based solely on information provided by her, and that, in the absence of a face-to-face physical evaluation by the physician, the diagnosis she receives is both limited and provisional in terms of accuracy and completeness  This is not intended to replace a full medical face-to-face evaluation by the physician  Leif Prince understands and accepts these terms

## 2021-02-15 LAB
LEFT EYE DIABETIC RETINOPATHY: NORMAL
RIGHT EYE DIABETIC RETINOPATHY: NORMAL

## 2021-03-02 DIAGNOSIS — Z23 ENCOUNTER FOR IMMUNIZATION: ICD-10-CM

## 2021-03-03 ENCOUNTER — LAB (OUTPATIENT)
Dept: LAB | Age: 76
End: 2021-03-03
Payer: MEDICARE

## 2021-03-03 DIAGNOSIS — E83.52 HYPERCALCEMIA: Primary | ICD-10-CM

## 2021-03-03 DIAGNOSIS — Z13.820 SCREENING FOR OSTEOPOROSIS: ICD-10-CM

## 2021-03-03 DIAGNOSIS — Z78.0 POST-MENOPAUSAL: ICD-10-CM

## 2021-03-03 DIAGNOSIS — E83.52 HYPERCALCEMIA: ICD-10-CM

## 2021-03-03 LAB
ALBUMIN SERPL BCP-MCNC: 3.7 G/DL (ref 3.5–5)
CALCIUM SERPL-MCNC: 10.4 MG/DL (ref 8.3–10.1)
PHOSPHATE SERPL-MCNC: 3.3 MG/DL (ref 2.3–4.1)
PTH-INTACT SERPL-MCNC: 43.9 PG/ML (ref 18.4–80.1)

## 2021-03-03 PROCEDURE — 82040 ASSAY OF SERUM ALBUMIN: CPT

## 2021-03-03 PROCEDURE — 36415 COLL VENOUS BLD VENIPUNCTURE: CPT

## 2021-03-03 PROCEDURE — 82310 ASSAY OF CALCIUM: CPT

## 2021-03-03 PROCEDURE — 83970 ASSAY OF PARATHORMONE: CPT

## 2021-03-03 PROCEDURE — 84100 ASSAY OF PHOSPHORUS: CPT

## 2021-03-04 ENCOUNTER — IMMUNIZATIONS (OUTPATIENT)
Dept: FAMILY MEDICINE CLINIC | Facility: HOSPITAL | Age: 76
End: 2021-03-04

## 2021-03-04 DIAGNOSIS — Z23 ENCOUNTER FOR IMMUNIZATION: Primary | ICD-10-CM

## 2021-03-04 PROCEDURE — 0001A SARS-COV-2 / COVID-19 MRNA VACCINE (PFIZER-BIONTECH) 30 MCG: CPT

## 2021-03-04 PROCEDURE — 91300 SARS-COV-2 / COVID-19 MRNA VACCINE (PFIZER-BIONTECH) 30 MCG: CPT

## 2021-03-24 DIAGNOSIS — E11.65 TYPE 2 DIABETES MELLITUS WITH HYPERGLYCEMIA, WITH LONG-TERM CURRENT USE OF INSULIN (HCC): ICD-10-CM

## 2021-03-24 DIAGNOSIS — Z79.4 TYPE 2 DIABETES MELLITUS WITH HYPERGLYCEMIA, WITH LONG-TERM CURRENT USE OF INSULIN (HCC): ICD-10-CM

## 2021-03-24 RX ORDER — INSULIN GLARGINE 100 [IU]/ML
25 INJECTION, SOLUTION SUBCUTANEOUS DAILY
Qty: 22.5 ML | Refills: 1 | Status: SHIPPED | OUTPATIENT
Start: 2021-03-24 | End: 2021-06-16

## 2021-03-25 ENCOUNTER — IMMUNIZATIONS (OUTPATIENT)
Dept: FAMILY MEDICINE CLINIC | Facility: HOSPITAL | Age: 76
End: 2021-03-25

## 2021-03-25 DIAGNOSIS — Z23 ENCOUNTER FOR IMMUNIZATION: Primary | ICD-10-CM

## 2021-03-25 PROCEDURE — 91300 SARS-COV-2 / COVID-19 MRNA VACCINE (PFIZER-BIONTECH) 30 MCG: CPT

## 2021-03-25 PROCEDURE — 0002A SARS-COV-2 / COVID-19 MRNA VACCINE (PFIZER-BIONTECH) 30 MCG: CPT

## 2021-05-04 DIAGNOSIS — Z79.4 TYPE 2 DIABETES MELLITUS WITH HYPERGLYCEMIA, WITH LONG-TERM CURRENT USE OF INSULIN (HCC): ICD-10-CM

## 2021-05-04 DIAGNOSIS — E11.65 TYPE 2 DIABETES MELLITUS WITH HYPERGLYCEMIA, WITH LONG-TERM CURRENT USE OF INSULIN (HCC): ICD-10-CM

## 2021-05-04 RX ORDER — BLOOD SUGAR DIAGNOSTIC
1 STRIP MISCELLANEOUS 3 TIMES DAILY
Qty: 300 EACH | Refills: 1 | Status: SHIPPED | OUTPATIENT
Start: 2021-05-04 | End: 2021-07-14 | Stop reason: SDUPTHER

## 2021-05-19 ENCOUNTER — OFFICE VISIT (OUTPATIENT)
Dept: ENDOCRINOLOGY | Facility: CLINIC | Age: 76
End: 2021-05-19
Payer: MEDICARE

## 2021-05-19 VITALS
BODY MASS INDEX: 30.96 KG/M2 | HEIGHT: 61 IN | DIASTOLIC BLOOD PRESSURE: 70 MMHG | WEIGHT: 164 LBS | HEART RATE: 67 BPM | SYSTOLIC BLOOD PRESSURE: 126 MMHG

## 2021-05-19 DIAGNOSIS — E78.5 HYPERLIPIDEMIA, UNSPECIFIED HYPERLIPIDEMIA TYPE: ICD-10-CM

## 2021-05-19 DIAGNOSIS — E04.1 THYROID NODULE: ICD-10-CM

## 2021-05-19 DIAGNOSIS — E11.65 TYPE 2 DIABETES MELLITUS WITH HYPERGLYCEMIA, WITH LONG-TERM CURRENT USE OF INSULIN (HCC): Primary | ICD-10-CM

## 2021-05-19 DIAGNOSIS — Z79.4 TYPE 2 DIABETES MELLITUS WITH HYPERGLYCEMIA, WITH LONG-TERM CURRENT USE OF INSULIN (HCC): Primary | ICD-10-CM

## 2021-05-19 DIAGNOSIS — E83.52 HYPERCALCEMIA: ICD-10-CM

## 2021-05-19 DIAGNOSIS — R80.9 MICROALBUMINURIA: ICD-10-CM

## 2021-05-19 DIAGNOSIS — I10 ESSENTIAL HYPERTENSION: ICD-10-CM

## 2021-05-19 DIAGNOSIS — E55.9 VITAMIN D DEFICIENCY: ICD-10-CM

## 2021-05-19 LAB — SL AMB POCT HEMOGLOBIN AIC: 7.4 (ref ?–6.5)

## 2021-05-19 PROCEDURE — 83036 HEMOGLOBIN GLYCOSYLATED A1C: CPT | Performed by: PHYSICIAN ASSISTANT

## 2021-05-19 PROCEDURE — 99214 OFFICE O/P EST MOD 30 MIN: CPT | Performed by: PHYSICIAN ASSISTANT

## 2021-05-19 NOTE — PROGRESS NOTES
Established Patient Progress Note      Chief Complaint   Patient presents with    Diabetes Type 2        History of Present Illness:   Toshia Mendez is a 77 y o  female with a history of type 2 diabetes with long term use of insulin since many years ago  Reports complications of microalbuminuria  Denies recent illness or hospitalizations  Denies recent severe hypoglycemic or severe hyperglycemic episodes  Denies any issues with her current regimen  home glucose monitoring: are performed regularly 2x per day and most readings are in the low/mid 100s  No hypoglycemia  She did initially lose weight with trulicity but weight has been stable over the past year or so  Current regimen:   Trulicity 8 90XW weekly   Glimepiride 2mg twice per day   Lantus 25 units daily   Metformin    Last Eye Exam: UTD  Last Foot Exam: Today at visit  Has hypertension: Taking propranolol  Has hyperlipidemia: Taking atorvastatin    Thyroid disorders: Thyroid nodule, Ultrasound 2016 showed 1 5cm isthmus nodule  Hasn't done ultrasound due to covid concerns and then 's hospitalization  Denies dysphagia  For Vitamin D Deficiency, taking supplements    For the hypercalcemia, PTH came back normal  Calcium remained high but improved on repeat lab test   She did not yet complete bone density  Reports years ago had ostepenia and was treated with fosamax  She has been trying to get calcium in her diet  She has no history of kidney stones       Patient Active Problem List   Diagnosis    Hyperlipidemia    Hypertension    Microalbuminuria    Thyroid nodule    Tremor    Type 2 diabetes mellitus with hyperglycemia, with long-term current use of insulin ([facility])    Vitamin D deficiency    Bilateral impacted cerumen    Sensorineural hearing loss (SNHL), bilateral    Hypercalcemia      Past Medical History:   Diagnosis Date    [facility]     Diabetes mellitus [facility]     Disease of thyroid gland     DVT, bilateral lower limbs [facility]     Hyperlipidemia     Hypertension     Hypertension     Kidney stones     Renal disorder       Past Surgical History:   Procedure Laterality Date    HYSTERECTOMY      partial      Family History   Problem Relation Age of Onset    Cancer Family     No Known Problems Mother     No Known Problems Father      Social History     Tobacco Use    Smoking status: Never Smoker    Smokeless tobacco: Never Used   Substance Use Topics    Alcohol use: No     Allergies   Allergen Reactions    Sulfa Antibiotics Rash         Current Outpatient Medications:     apixaban (ELIQUIS) 5 mg, Take 5 mg by mouth 2 (two) times a day, Disp: , Rfl:     atorvastatin (LIPITOR) 20 mg tablet, Take by mouth, Disp: , Rfl:     Blood Glucose Monitoring Suppl (ONETOUCH VERIO) w/Device KIT, Use to check BG  DX E11 9, patient will check blood sugars 3 times daily, Disp: 1 kit, Rfl: 1    Cholecalciferol (Vitamin D3) 50 MCG (2000 UT) capsule, 2000 units twice per day, Disp: 30 capsule, Rfl: 5    Dulaglutide (Trulicity) 2 62 QZ/0 5HL SOPN, Inject 0 5 mL (0 75 mg total) under the skin once a week, Disp: 4 pen, Rfl: 5    glimepiride (AMARYL) 2 mg tablet, TAKE 1 TABLET BY MOUTH TWO  TIMES DAILY, Disp: 180 tablet, Rfl: 3    insulin glargine (Lantus SoloStar) 100 units/mL injection pen, Inject 25 Units under the skin daily, Disp: 22 5 mL, Rfl: 1    latanoprost (XALATAN) 0 005 % ophthalmic solution, Administer 1 drop to both eyes daily at bedtime, Disp: , Rfl:     losartan-hydrochlorothiazide (HYZAAR) 100-12 5 MG per tablet, Take by mouth, Disp: , Rfl:     metFORMIN (GLUCOPHAGE) 1000 MG tablet, Take 1 tablet (1,000 mg total) by mouth 2 (two) times a day with meals, Disp: 180 tablet, Rfl: 1    ONETOUCH DELICA LANCETS 27P MISC, TEST BLOOD SUGARS THREE TIMES DAILY, Disp: , Rfl:     OneTouch Verio test strip, Use 1 each 3 (three) times a day, Disp: 300 each, Rfl: 1    propranolol (INDERAL) 20 mg tablet, Take by mouth, Disp: , Rfl:     Review of Systems   Constitutional: Negative for activity change, appetite change, chills, diaphoresis, fatigue, fever and unexpected weight change  HENT: Negative for trouble swallowing and voice change  Eyes: Negative for visual disturbance  Respiratory: Negative for shortness of breath  Cardiovascular: Negative for chest pain and palpitations  Gastrointestinal: Negative for abdominal pain, constipation and diarrhea  Endocrine: Negative for cold intolerance, heat intolerance, polydipsia, polyphagia and polyuria  Genitourinary: Negative for frequency and menstrual problem  Musculoskeletal: Negative for arthralgias and myalgias  Skin: Negative for rash  Allergic/Immunologic: Negative for food allergies  Neurological: Negative for dizziness and tremors  Hematological: Negative for adenopathy  Psychiatric/Behavioral: Negative for sleep disturbance  All other systems reviewed and are negative  Physical Exam:  Body mass index is 30 99 kg/m²  /70 (BP Location: Left arm, Patient Position: Sitting)   Pulse 67   Ht 5' 1" (1 549 m)   Wt 74 4 kg (164 lb)   BMI 30 99 kg/m²    Wt Readings from Last 3 Encounters:   05/19/21 74 4 kg (164 lb)   07/30/20 74 8 kg (165 lb)   05/15/20 74 8 kg (165 lb)       Physical Exam  Vitals signs reviewed  Constitutional:       General: She is not in acute distress  Appearance: She is well-developed  HENT:      Head: Normocephalic and atraumatic  Eyes:      Conjunctiva/sclera: Conjunctivae normal       Pupils: Pupils are equal, round, and reactive to light  Neck:      Musculoskeletal: Normal range of motion and neck supple  Thyroid: No thyromegaly  Cardiovascular:      Rate and Rhythm: Normal rate and regular rhythm  Pulses: no weak pulses          Dorsalis pedis pulses are 2+ on the right side and 2+ on the left side  Posterior tibial pulses are 2+ on the right side and 2+ on the left side        Heart sounds: Normal heart sounds  Pulmonary:      Effort: Pulmonary effort is normal  No respiratory distress  Breath sounds: Normal breath sounds  No wheezing or rales  Abdominal:      General: Bowel sounds are normal  There is no distension  Palpations: Abdomen is soft  Tenderness: There is no abdominal tenderness  Musculoskeletal: Normal range of motion  Feet:      Right foot:      Skin integrity: No ulcer, skin breakdown, erythema, warmth, callus or dry skin  Left foot:      Skin integrity: No ulcer, skin breakdown, erythema, warmth, callus or dry skin  Skin:     General: Skin is warm and dry  Neurological:      Mental Status: She is alert and oriented to person, place, and time  Patient's shoes and socks removed  Right Foot/Ankle   Right Foot Inspection  Skin Exam: skin normal and skin intact no dry skin, no warmth, no callus, no erythema, no maceration, no abnormal color, no pre-ulcer, no ulcer and no callus                          Toe Exam: ROM and strength within normal limitsno swelling, no tenderness, erythema and  no right toe deformity  Sensory   Vibration: intact    Monofilament testing: intact  Vascular  Capillary refills: < 3 seconds  The right DP pulse is 2+  The right PT pulse is 2+  Left Foot/Ankle  Left Foot Inspection  Skin Exam: skin normal and skin intactno dry skin, no warmth, no erythema, no maceration, normal color, no pre-ulcer, no ulcer and no callus                         Toe Exam: ROM and strength within normal limitsno swelling, no tenderness, no erythema and no left toe deformity                   Sensory   Vibration: intact    Monofilament: intact  Vascular  Capillary refills: < 3 seconds  The left DP pulse is 2+  The left PT pulse is 2+  Assign Risk Category:  No deformity present; No loss of protective sensation;  No weak pulses       Risk: 0      Labs:   Lab Results   Component Value Date    HGBA1C 7 4 (A) 05/19/2021    HGBA1C 7 3 (H) 01/07/2021 HGBA1C 7 2 (H) 08/25/2020     Lab Results   Component Value Date    CREATININE 0 74 01/07/2021    CREATININE 0 76 08/25/2020    CREATININE 0 83 01/30/2020    BUN 15 01/07/2021     09/18/2015    K 4 0 01/07/2021     01/07/2021    CO2 28 01/07/2021     eGFR   Date Value Ref Range Status   01/07/2021 80 ml/min/1 73sq m Final     Lab Results   Component Value Date    CHOL 119 03/26/2015    HDL 38 (L) 08/25/2020    TRIG 205 (H) 08/25/2020     Lab Results   Component Value Date    ALT 17 01/07/2021    AST 12 01/07/2021    ALKPHOS 75 01/07/2021    BILITOT 0 61 09/18/2015     Lab Results   Component Value Date    MHB3IQHLIVKI 1 670 01/30/2020    QLA0WMBWPMCR 1 300 06/27/2017    WHL4KIDYPZFV 2 180 03/24/2017     Lab Results   Component Value Date    FREET4 0 99 01/30/2020       Impression & Plan:    Problem List Items Addressed This Visit        Endocrine    Thyroid nodule     Encouraged her to complete the ultrasound for continued monitoring  Relevant Orders    US thyroid    TSH, 3rd generation    T4, free    Type 2 diabetes mellitus with hyperglycemia, with long-term current use of insulin (HCC) - Primary     A1C 7 4 which is acceptable for her age and her glucometer readings look great and she is no longer having hypoglycemia  Continue current regimen  Lab Results   Component Value Date    HGBA1C 7 4 (A) 05/19/2021            Relevant Orders    POCT hemoglobin A1c (Completed)    Hemoglobin A1C    Comprehensive metabolic panel    Lipid Panel with Direct LDL reflex       Cardiovascular and Mediastinum    Hypertension     Well controlled on current regimen  Other    Hyperlipidemia     Continue atorvastatin  Relevant Orders    Comprehensive metabolic panel    Lipid Panel with Direct LDL reflex    TSH, 3rd generation    T4, free    Microalbuminuria     Continue ARB         Vitamin D deficiency     Continue supplement            Hypercalcemia     Calcium level mildly elevated but has improved, PTH normal, may be due to HCTZ  Continue to monitor  She does report hx osteopenia, advised her to complete DEXA scan  Continue dietary calcium intake  Orders Placed This Encounter   Procedures    US thyroid     Standing Status:   Future     Standing Expiration Date:   5/19/2022     Scheduling Instructions:      No prep required  Please bring your physician order, insurance cards, a form of photo ID and a list of your medications with you  Arrive 15 minutes prior to your appointment time in order to register  To schedule this appointment, please contact central scheduling at [phone]     Order Specific Question:   Reason for Exam:     Answer:   Thyroid disorder    Hemoglobin A1C     Standing Status:   Future     Standing Expiration Date:   5/19/2022    Comprehensive metabolic panel     This is a patient instruction: Patient fasting for 8 hours or longer recommended  Standing Status:   Future     Standing Expiration Date:   5/19/2022    Lipid Panel with Direct LDL reflex     This is a patient instruction: This test requires patient fasting for 10-12 hours or longer  Drinking of black coffee or black tea is acceptable  Standing Status:   Future     Standing Expiration Date:   5/19/2022    TSH, 3rd generation     This is a patient instruction: This test is non-fasting  Please drink two glasses of water morning of bloodwork  Standing Status:   Future     Standing Expiration Date:   5/19/2022    T4, free     Standing Status:   Future     Standing Expiration Date:   5/19/2022    POCT hemoglobin A1c       There are no Patient Instructions on file for this visit  Discussed with the patient and all questioned fully answered  She will call me if any problems arise  Follow-up appointment in 3-4 months       Counseled patient on diagnostic results, prognosis, risk and benefit of treatment options, instruction for management, importance of treatment compliance, Risk  factor reduction and impressions    Mel Combs PA-C

## 2021-05-19 NOTE — ASSESSMENT & PLAN NOTE
A1C 7 4 which is acceptable for her age and her glucometer readings look great and she is no longer having hypoglycemia  Continue current regimen     Lab Results   Component Value Date    HGBA1C 7 4 (A) 05/19/2021

## 2021-05-19 NOTE — ASSESSMENT & PLAN NOTE
Calcium level mildly elevated but has improved, PTH normal, may be due to HCTZ  Continue to monitor  She does report hx osteopenia, advised her to complete DEXA scan  Continue dietary calcium intake

## 2021-05-20 ENCOUNTER — HOSPITAL ENCOUNTER (OUTPATIENT)
Dept: RADIOLOGY | Age: 76
Discharge: HOME | End: 2021-05-20
Payer: MEDICARE

## 2021-05-20 DIAGNOSIS — E04.1 THYROID NODULE: ICD-10-CM

## 2021-05-20 PROCEDURE — 76536 US EXAM OF HEAD AND NECK: CPT

## 2021-05-26 ENCOUNTER — TELEPHONE (OUTPATIENT)
Dept: ENDOCRINOLOGY | Facility: CLINIC | Age: 76
End: 2021-05-26

## 2021-05-26 NOTE — TELEPHONE ENCOUNTER
----- Message from Natalee Lim PA-C sent at 5/26/2021  3:20 PM EDT -----  Thyroid nodule stable  We don't need to do another ultrasound unless any problems

## 2021-05-26 NOTE — TELEPHONE ENCOUNTER
----- Message from Bailey Pereira PA-C sent at 5/26/2021  3:20 PM EDT -----  Thyroid nodule stable  We don't need to do another ultrasound unless any problems

## 2021-06-16 DIAGNOSIS — E11.65 TYPE 2 DIABETES MELLITUS WITH HYPERGLYCEMIA, WITH LONG-TERM CURRENT USE OF INSULIN (HCC): ICD-10-CM

## 2021-06-16 DIAGNOSIS — Z79.4 TYPE 2 DIABETES MELLITUS WITH HYPERGLYCEMIA, WITH LONG-TERM CURRENT USE OF INSULIN (HCC): ICD-10-CM

## 2021-06-16 RX ORDER — INSULIN GLARGINE 100 [IU]/ML
INJECTION, SOLUTION SUBCUTANEOUS
Qty: 30 ML | Refills: 3 | Status: SHIPPED | OUTPATIENT
Start: 2021-06-16 | End: 2021-08-25 | Stop reason: SDUPTHER

## 2021-06-22 DIAGNOSIS — Z79.4 TYPE 2 DIABETES MELLITUS WITH HYPERGLYCEMIA, WITH LONG-TERM CURRENT USE OF INSULIN (HCC): ICD-10-CM

## 2021-06-22 DIAGNOSIS — E11.65 TYPE 2 DIABETES MELLITUS WITH HYPERGLYCEMIA, WITH LONG-TERM CURRENT USE OF INSULIN (HCC): ICD-10-CM

## 2021-06-22 RX ORDER — DULAGLUTIDE 0.75 MG/.5ML
0.75 INJECTION, SOLUTION SUBCUTANEOUS WEEKLY
Qty: 6 ML | Refills: 1 | Status: SHIPPED | OUTPATIENT
Start: 2021-06-22 | End: 2021-11-16 | Stop reason: SDUPTHER

## 2021-06-22 RX ORDER — GLIMEPIRIDE 2 MG/1
2 TABLET ORAL 2 TIMES DAILY
Qty: 180 TABLET | Refills: 3 | Status: SHIPPED | OUTPATIENT
Start: 2021-06-22 | End: 2022-04-13

## 2021-07-14 DIAGNOSIS — Z79.4 TYPE 2 DIABETES MELLITUS WITH HYPERGLYCEMIA, WITH LONG-TERM CURRENT USE OF INSULIN (HCC): ICD-10-CM

## 2021-07-14 DIAGNOSIS — E11.65 TYPE 2 DIABETES MELLITUS WITH HYPERGLYCEMIA, WITH LONG-TERM CURRENT USE OF INSULIN (HCC): ICD-10-CM

## 2021-07-14 RX ORDER — BLOOD SUGAR DIAGNOSTIC
1 STRIP MISCELLANEOUS 3 TIMES DAILY
Qty: 300 EACH | Refills: 1 | Status: SHIPPED | OUTPATIENT
Start: 2021-07-14

## 2021-08-18 ENCOUNTER — APPOINTMENT (OUTPATIENT)
Dept: LAB | Age: 76
End: 2021-08-18
Payer: MEDICARE

## 2021-08-18 DIAGNOSIS — E04.1 THYROID NODULE: ICD-10-CM

## 2021-08-18 DIAGNOSIS — E11.65 TYPE 2 DIABETES MELLITUS WITH HYPERGLYCEMIA, WITH LONG-TERM CURRENT USE OF INSULIN (HCC): ICD-10-CM

## 2021-08-18 DIAGNOSIS — E78.5 HYPERLIPIDEMIA, UNSPECIFIED HYPERLIPIDEMIA TYPE: ICD-10-CM

## 2021-08-18 DIAGNOSIS — Z79.4 TYPE 2 DIABETES MELLITUS WITH HYPERGLYCEMIA, WITH LONG-TERM CURRENT USE OF INSULIN (HCC): ICD-10-CM

## 2021-08-18 LAB
ALBUMIN SERPL BCP-MCNC: 3.3 G/DL (ref 3.5–5)
ALP SERPL-CCNC: 69 U/L (ref 46–116)
ALT SERPL W P-5'-P-CCNC: 19 U/L (ref 12–78)
ANION GAP SERPL CALCULATED.3IONS-SCNC: 8 MMOL/L (ref 4–13)
AST SERPL W P-5'-P-CCNC: 17 U/L (ref 5–45)
BILIRUB SERPL-MCNC: 0.55 MG/DL (ref 0.2–1)
BUN SERPL-MCNC: 18 MG/DL (ref 5–25)
CALCIUM ALBUM COR SERPL-MCNC: 10.5 MG/DL (ref 8.3–10.1)
CALCIUM SERPL-MCNC: 9.9 MG/DL (ref 8.3–10.1)
CHLORIDE SERPL-SCNC: 104 MMOL/L (ref 100–108)
CHOLEST SERPL-MCNC: 118 MG/DL (ref 50–200)
CO2 SERPL-SCNC: 26 MMOL/L (ref 21–32)
CREAT SERPL-MCNC: 0.82 MG/DL (ref 0.6–1.3)
EST. AVERAGE GLUCOSE BLD GHB EST-MCNC: 169 MG/DL
GFR SERPL CREATININE-BSD FRML MDRD: 70 ML/MIN/1.73SQ M
GLUCOSE P FAST SERPL-MCNC: 94 MG/DL (ref 65–99)
HBA1C MFR BLD: 7.5 %
HDLC SERPL-MCNC: 37 MG/DL
LDLC SERPL CALC-MCNC: 41 MG/DL (ref 0–100)
POTASSIUM SERPL-SCNC: 3.6 MMOL/L (ref 3.5–5.3)
PROT SERPL-MCNC: 7.5 G/DL (ref 6.4–8.2)
SODIUM SERPL-SCNC: 138 MMOL/L (ref 136–145)
T4 FREE SERPL-MCNC: 0.93 NG/DL (ref 0.76–1.46)
TRIGL SERPL-MCNC: 200 MG/DL
TSH SERPL DL<=0.05 MIU/L-ACNC: 1.12 UIU/ML (ref 0.36–3.74)

## 2021-08-18 PROCEDURE — 36415 COLL VENOUS BLD VENIPUNCTURE: CPT

## 2021-08-18 PROCEDURE — 84439 ASSAY OF FREE THYROXINE: CPT

## 2021-08-18 PROCEDURE — 80053 COMPREHEN METABOLIC PANEL: CPT

## 2021-08-18 PROCEDURE — 83036 HEMOGLOBIN GLYCOSYLATED A1C: CPT

## 2021-08-18 PROCEDURE — 80061 LIPID PANEL: CPT

## 2021-08-18 PROCEDURE — 84443 ASSAY THYROID STIM HORMONE: CPT

## 2021-08-19 ENCOUNTER — TELEPHONE (OUTPATIENT)
Dept: ENDOCRINOLOGY | Facility: CLINIC | Age: 76
End: 2021-08-19

## 2021-08-25 ENCOUNTER — OFFICE VISIT (OUTPATIENT)
Dept: ENDOCRINOLOGY | Facility: CLINIC | Age: 76
End: 2021-08-25
Payer: MEDICARE

## 2021-08-25 VITALS
DIASTOLIC BLOOD PRESSURE: 76 MMHG | WEIGHT: 163.6 LBS | SYSTOLIC BLOOD PRESSURE: 128 MMHG | BODY MASS INDEX: 30.89 KG/M2 | HEART RATE: 68 BPM | HEIGHT: 61 IN

## 2021-08-25 DIAGNOSIS — Z79.4 TYPE 2 DIABETES MELLITUS WITH HYPERGLYCEMIA, WITH LONG-TERM CURRENT USE OF INSULIN (HCC): ICD-10-CM

## 2021-08-25 DIAGNOSIS — R80.9 MICROALBUMINURIA: ICD-10-CM

## 2021-08-25 DIAGNOSIS — E04.1 THYROID NODULE: ICD-10-CM

## 2021-08-25 DIAGNOSIS — E11.65 TYPE 2 DIABETES MELLITUS WITH HYPERGLYCEMIA, WITH LONG-TERM CURRENT USE OF INSULIN (HCC): ICD-10-CM

## 2021-08-25 DIAGNOSIS — E83.52 HYPERCALCEMIA: Primary | ICD-10-CM

## 2021-08-25 DIAGNOSIS — E55.9 VITAMIN D DEFICIENCY: ICD-10-CM

## 2021-08-25 PROCEDURE — 99214 OFFICE O/P EST MOD 30 MIN: CPT | Performed by: PHYSICIAN ASSISTANT

## 2021-08-25 RX ORDER — INSULIN GLARGINE 100 [IU]/ML
INJECTION, SOLUTION SUBCUTANEOUS
Qty: 30 ML | Refills: 3 | Status: SHIPPED | OUTPATIENT
Start: 2021-08-25

## 2021-08-25 NOTE — ASSESSMENT & PLAN NOTE
Control is stable with no hypoglycemia  Continue current regimen     Lab Results   Component Value Date    HGBA1C 7 5 (H) 08/18/2021

## 2021-08-25 NOTE — PROGRESS NOTES
Established Patient Progress Note      Chief Complaint   Patient presents with    Diabetes Type 2        History of Present Illness:   Celio Bergman is a 77 y o  female with a history of type 2 diabetes with long term use of insulin since many years ago  Reports complications of microalbuminuria  Denies recent illness or hospitalizations  Denies recent severe hypoglycemic or severe hyperglycemic episodes  Denies any issues with her current regimen  home glucose monitoring: are performed regularly 2x per day at alternating times and most readings are in the 100-150 range with occasional excursions  Current regimen:   Lantus 25 units bedtime  Trulicity 1 18KF weekly  Glimepiride 2mg twice per day   Metformin 1000mg twice per day     Last Eye Exam: UTD  Last Foot Exam: UTD    Has hypertension: Taking propranolol and losartan and HCTZ  Has hyperlipidemia: Taking atorvastatin    Thyroid disorders: thyroid nodules- stable on 5/2021 ultrasound  For the hypercalcemia, PTH came back normal Bone density scan scheduled  Reports years ago had ostepenia and was treated with fosamax  She has been trying to get calcium in her diet  She has a distant history of kidney stones  She does get calcium in her diet but is not taking supplements    She has Vitamin D Deficiency and is taking supplements       Patient Active Problem List   Diagnosis    Hyperlipidemia    Hypertension    Microalbuminuria    Thyroid nodule    Tremor    Type 2 diabetes mellitus with hyperglycemia, with long-term current use of insulin (HCC)    Vitamin D deficiency    Bilateral impacted cerumen    Sensorineural hearing loss (SNHL), bilateral    Hypercalcemia      Past Medical History:   Diagnosis Date    [facility] [address] [facility]     [facility] [address] [facility]     Disease of thyroid gland     DVT, bilateral lower limbs ([facility] [address] [facility]     Hyperlipidemia     Hypertension     Hypertension     Kidney stones     Renal disorder       Past Surgical History: Procedure Laterality Date    HYSTERECTOMY      partial      Family History   Problem Relation Age of Onset    Cancer Family     No Known Problems Mother     No Known Problems Father      Social History     Tobacco Use    Smoking status: Never Smoker    Smokeless tobacco: Never Used   Substance Use Topics    Alcohol use: No     Allergies   Allergen Reactions    Sulfa Antibiotics Rash         Current Outpatient Medications:     apixaban (ELIQUIS) 5 mg, Take 5 mg by mouth 2 (two) times a day, Disp: , Rfl:     atorvastatin (LIPITOR) 20 mg tablet, Take by mouth daily at bedtime , Disp: , Rfl:     Blood Glucose Monitoring Suppl (ONETOUCH VERIO) w/Device KIT, Use to check BG  DX E11 9, patient will check blood sugars 3 times daily, Disp: 1 kit, Rfl: 1    Cholecalciferol (Vitamin D3) 50 MCG (2000 UT) capsule, 2000 units twice per day, Disp: 30 capsule, Rfl: 5    Dulaglutide (Trulicity) 8 28 [place]/7 5EP SOPN, Inject 0 5 mL (0 75 mg total) under the skin once a week, Disp: 6 mL, Rfl: 1    glimepiride (AMARYL) 2 mg tablet, Take 1 tablet (2 mg total) by mouth 2 (two) times a day, Disp: 180 tablet, Rfl: 3    insulin glargine (Lantus SoloStar) 100 units/mL injection pen, 25 units daily at bedtime, Disp: 30 mL, Rfl: 3    latanoprost (XALATAN) 0 005 % ophthalmic solution, Administer 1 drop to both eyes daily at bedtime, Disp: , Rfl:     losartan-hydrochlorothiazide (HYZAAR) 100-12 5 MG per tablet, Take by mouth, Disp: , Rfl:     metFORMIN (GLUCOPHAGE) 1000 MG tablet, Take 1 tablet (1,000 mg total) by mouth 2 (two) times a day with meals, Disp: 180 tablet, Rfl: 1    ONETOUCH DELICA LANCETS 15D MISC, TEST BLOOD SUGARS THREE TIMES DAILY, Disp: , Rfl:     OneTouch Verio test strip, Use 1 each 3 (three) times a day, Disp: 300 each, Rfl: 1    propranolol (INDERAL) 20 mg tablet, Take 20 mg by mouth Two tablets a day, Disp: , Rfl:     Review of Systems   Constitutional: Negative for activity change, appetite change, chills, diaphoresis, fatigue, fever and unexpected weight change  HENT: Negative for trouble swallowing and voice change  Eyes: Negative for visual disturbance  Respiratory: Negative for shortness of breath  Cardiovascular: Negative for chest pain and palpitations  Gastrointestinal: Negative for abdominal pain, constipation and diarrhea  Endocrine: Negative for cold intolerance, heat intolerance, polydipsia, polyphagia and polyuria  Genitourinary: Negative for frequency and menstrual problem  Musculoskeletal: Negative for arthralgias and myalgias  Skin: Negative for rash  Allergic/Immunologic: Negative for food allergies  Neurological: Negative for dizziness and tremors  Hematological: Negative for adenopathy  Psychiatric/Behavioral: Negative for sleep disturbance  All other systems reviewed and are negative  Physical Exam:  Body mass index is 30 91 kg/m²  /76   Pulse 68   Ht 5' 1" (1 549 m)   Wt 74 2 kg (163 lb 9 6 oz)   BMI 30 91 kg/m²    Wt Readings from Last 3 Encounters:   08/25/21 74 2 kg (163 lb 9 6 oz)   07/29/21 74 4 kg (164 lb)   05/19/21 74 4 kg (164 lb)       Physical Exam  Vitals reviewed  Constitutional:       General: She is not in acute distress  Appearance: She is well-developed  HENT:      Head: Normocephalic and atraumatic  Eyes:      Conjunctiva/sclera: Conjunctivae normal       Pupils: Pupils are equal, round, and reactive to light  Neck:      Thyroid: No thyromegaly  Cardiovascular:      Rate and Rhythm: Normal rate and regular rhythm  Heart sounds: Normal heart sounds  Pulmonary:      Effort: Pulmonary effort is normal  No respiratory distress  Breath sounds: Normal breath sounds  No wheezing or rales  Abdominal:      General: Bowel sounds are normal  There is no distension  Palpations: Abdomen is soft  Tenderness: There is no abdominal tenderness  Musculoskeletal:         General: Normal range of motion  Cervical back: Normal range of motion and neck supple  Skin:     General: Skin is warm and dry  Neurological:      Mental Status: She is alert and oriented to person, place, and time  Labs:     Component      Latest Ref Rng & Units 3/3/2021 3/3/2021 3/3/2021           2:07 PM  2:07 PM  2:07 PM   Sodium      136 - 145 mmol/L      Potassium      3 5 - 5 3 mmol/L      Chloride      100 - 108 mmol/L      CO2      21 - 32 mmol/L      Anion Gap      4 - 13 mmol/L      BUN      5 - 25 mg/dL      Creatinine      0 60 - 1 30 mg/dL      GLUCOSE FASTING      65 - 99 mg/dL      Calcium      8 3 - 10 1 mg/dL   10 4 (H)   CORRECTED CALCIUM      8 3 - 10 1 mg/dL      AST      5 - 45 U/L      ALT      12 - 78 U/L      Alkaline Phosphatase      46 - 116 U/L      Total Protein      6 4 - 8 2 g/dL      Albumin      3 5 - 5 0 g/dL  3 7    TOTAL BILIRUBIN      0 20 - 1 00 mg/dL      eGFR      ml/min/1 73sq m      Cholesterol      50 - 200 mg/dL      Triglycerides      <=150 mg/dL      HDL      >=40 mg/dL      LDL Calculated      0 - 100 mg/dL      Hemoglobin A1C      Normal 3 8-5 6%; PreDiabetic 5 7-6 4%;  Diabetic >=6 5%; Glycemic control for adults with diabetes <7 0% %      eAG, EST AVG Glucose      mg/dl      Phosphorus      2 3 - 4 1 mg/dL      PARATHYROID HORMONE      18 4 - 80 1 pg/mL 43 9     TSH 3RD GENERATON      0 358 - 3 740 uIU/mL      Free T4      0 76 - 1 46 ng/dL        Component      Latest Ref Rng & Units 3/3/2021 5/19/2021 8/18/2021           2:07 PM  9:27 AM 10:26 AM   Sodium      136 - 145 mmol/L   138   Potassium      3 5 - 5 3 mmol/L   3 6   Chloride      100 - 108 mmol/L   104   CO2      21 - 32 mmol/L   26   Anion Gap      4 - 13 mmol/L   8   BUN      5 - 25 mg/dL   18   Creatinine      0 60 - 1 30 mg/dL   0 82   GLUCOSE FASTING      65 - 99 mg/dL   94   Calcium      8 3 - 10 1 mg/dL   9 9   CORRECTED CALCIUM      8 3 - 10 1 mg/dL   10 5 (H)   AST      5 - 45 U/L   17   ALT      12 - 78 U/L   19 Alkaline Phosphatase      46 - 116 U/L   69   Total Protein      6 4 - 8 2 g/dL   7 5   Albumin      3 5 - 5 0 g/dL   3 3 (L)   TOTAL BILIRUBIN      0 20 - 1 00 mg/dL   0 55   eGFR      ml/min/1 73sq m   70   Cholesterol      50 - 200 mg/dL      Triglycerides      <=150 mg/dL      HDL      >=40 mg/dL      LDL Calculated      0 - 100 mg/dL      Hemoglobin A1C      Normal 3 8-5 6%; PreDiabetic 5 7-6 4%; Diabetic >=6 5%; Glycemic control for adults with diabetes <7 0% %  7 4 (A)    eAG, EST AVG Glucose      mg/dl      Phosphorus      2 3 - 4 1 mg/dL 3 3     PARATHYROID HORMONE      18 4 - 80 1 pg/mL      TSH 3RD GENERATON      0 358 - 3 740 uIU/mL      Free T4      0 76 - 1 46 ng/dL        Component      Latest Ref Rng & Units 8/18/2021 8/18/2021 8/18/2021          10:26 AM 10:26 AM 10:26 AM   Sodium      136 - 145 mmol/L      Potassium      3 5 - 5 3 mmol/L      Chloride      100 - 108 mmol/L      CO2      21 - 32 mmol/L      Anion Gap      4 - 13 mmol/L      BUN      5 - 25 mg/dL      Creatinine      0 60 - 1 30 mg/dL      GLUCOSE FASTING      65 - 99 mg/dL      Calcium      8 3 - 10 1 mg/dL      CORRECTED CALCIUM      8 3 - 10 1 mg/dL      AST      5 - 45 U/L      ALT      12 - 78 U/L      Alkaline Phosphatase      46 - 116 U/L      Total Protein      6 4 - 8 2 g/dL      Albumin      3 5 - 5 0 g/dL      TOTAL BILIRUBIN      0 20 - 1 00 mg/dL      eGFR      ml/min/1 73sq m      Cholesterol      50 - 200 mg/dL 118     Triglycerides      <=150 mg/dL 200 (H)     HDL      >=40 mg/dL 37 (L)     LDL Calculated      0 - 100 mg/dL 41     Hemoglobin A1C      Normal 3 8-5 6%; PreDiabetic 5 7-6 4%;  Diabetic >=6 5%; Glycemic control for adults with diabetes <7 0% %      eAG, EST AVG Glucose      mg/dl      Phosphorus      2 3 - 4 1 mg/dL      PARATHYROID HORMONE      18 4 - 80 1 pg/mL      TSH 3RD GENERATON      0 358 - 3 740 uIU/mL  1 120    Free T4      0 76 - 1 46 ng/dL   0 93     Component      Latest Ref Rng & Units 8/18/2021          10:26 AM   Sodium      136 - 145 mmol/L    Potassium      3 5 - 5 3 mmol/L    Chloride      100 - 108 mmol/L    CO2      21 - 32 mmol/L    Anion Gap      4 - 13 mmol/L    BUN      5 - 25 mg/dL    Creatinine      0 60 - 1 30 mg/dL    GLUCOSE FASTING      65 - 99 mg/dL    Calcium      8 3 - 10 1 mg/dL    CORRECTED CALCIUM      8 3 - 10 1 mg/dL    AST      5 - 45 U/L    ALT      12 - 78 U/L    Alkaline Phosphatase      46 - 116 U/L    Total Protein      6 4 - 8 2 g/dL    Albumin      3 5 - 5 0 g/dL    TOTAL BILIRUBIN      0 20 - 1 00 mg/dL    eGFR      ml/min/1 73sq m    Cholesterol      50 - 200 mg/dL    Triglycerides      <=150 mg/dL    HDL      >=40 mg/dL    LDL Calculated      0 - 100 mg/dL    Hemoglobin A1C      Normal 3 8-5 6%; PreDiabetic 5 7-6 4%; Diabetic >=6 5%; Glycemic control for adults with diabetes <7 0% % 7 5 (H)   eAG, EST AVG Glucose      mg/dl 169   Phosphorus      2 3 - 4 1 mg/dL    PARATHYROID HORMONE      18 4 - 80 1 pg/mL    TSH 3RD GENERATON      0 358 - 3 740 uIU/mL    Free T4      0 76 - 1 46 ng/dL        Impression & Plan:    Problem List Items Addressed This Visit        Endocrine    Thyroid nodule     Nodule stable on ultrasound 5/2021  Type 2 diabetes mellitus with hyperglycemia, with long-term current use of insulin (HCC)     Control is stable with no hypoglycemia  Continue current regimen  Lab Results   Component Value Date    HGBA1C 7 5 (H) 08/18/2021            Relevant Medications    insulin glargine (Lantus SoloStar) 100 units/mL injection pen    Other Relevant Orders    Hemoglobin A1C    Comprehensive metabolic panel       Other    Microalbuminuria     Continue ARB         Vitamin D deficiency     Continue supplement  Hypercalcemia - Primary     Mild/stable  Will monitor  She has DEXA scan scheduled next month            Relevant Orders    PTH, intact Lab Collect Lab Collect    Phosphorus Lab Collect          Orders Placed This Encounter Procedures    Hemoglobin A1C     Standing Status:   Future     Standing Expiration Date:   8/25/2022    Comprehensive metabolic panel     This is a patient instruction: Patient fasting for 8 hours or longer recommended  Standing Status:   Future     Standing Expiration Date:   8/25/2022    PTH, intact Lab Collect Lab Collect     Standing Status:   Future     Standing Expiration Date:   8/25/2022    Phosphorus Lab Collect     Standing Status:   Future     Standing Expiration Date:   8/25/2022       There are no Patient Instructions on file for this visit  Discussed with the patient and all questioned fully answered  She will call me if any problems arise  Follow-up appointment in 4 months       Counseled patient on diagnostic results, prognosis, risk and benefit of treatment options, instruction for management, importance of treatment compliance, Risk  factor reduction and impressions    Mali Pardo PA-C

## 2021-09-29 ENCOUNTER — HOSPITAL ENCOUNTER (OUTPATIENT)
Dept: RADIOLOGY | Age: 76
Discharge: HOME | End: 2021-09-29
Payer: MEDICARE

## 2021-09-29 DIAGNOSIS — Z78.0 POST-MENOPAUSAL: ICD-10-CM

## 2021-09-29 DIAGNOSIS — Z13.820 SCREENING FOR OSTEOPOROSIS: ICD-10-CM

## 2021-09-29 DIAGNOSIS — E83.52 HYPERCALCEMIA: ICD-10-CM

## 2021-09-29 PROCEDURE — 77080 DXA BONE DENSITY AXIAL: CPT

## 2021-10-28 ENCOUNTER — TELEPHONE (OUTPATIENT)
Dept: ENDOCRINOLOGY | Facility: CLINIC | Age: 76
End: 2021-10-28

## 2021-10-28 DIAGNOSIS — Z79.4 TYPE 2 DIABETES MELLITUS WITH HYPERGLYCEMIA, WITH LONG-TERM CURRENT USE OF INSULIN (HCC): ICD-10-CM

## 2021-10-28 DIAGNOSIS — E11.65 TYPE 2 DIABETES MELLITUS WITH HYPERGLYCEMIA, WITH LONG-TERM CURRENT USE OF INSULIN (HCC): ICD-10-CM

## 2021-11-13 ENCOUNTER — IMMUNIZATIONS (OUTPATIENT)
Dept: FAMILY MEDICINE CLINIC | Facility: HOSPITAL | Age: 76
End: 2021-11-13

## 2021-11-13 DIAGNOSIS — Z23 ENCOUNTER FOR IMMUNIZATION: Primary | ICD-10-CM

## 2021-11-13 PROCEDURE — 91300 COVID-19 PFIZER VACC 0.3 ML: CPT

## 2021-11-13 PROCEDURE — 0001A COVID-19 PFIZER VACC 0.3 ML: CPT

## 2021-11-16 DIAGNOSIS — E11.65 TYPE 2 DIABETES MELLITUS WITH HYPERGLYCEMIA, WITH LONG-TERM CURRENT USE OF INSULIN (HCC): ICD-10-CM

## 2021-11-16 DIAGNOSIS — Z79.4 TYPE 2 DIABETES MELLITUS WITH HYPERGLYCEMIA, WITH LONG-TERM CURRENT USE OF INSULIN (HCC): ICD-10-CM

## 2021-11-16 RX ORDER — DULAGLUTIDE 0.75 MG/.5ML
0.75 INJECTION, SOLUTION SUBCUTANEOUS WEEKLY
Qty: 6 ML | Refills: 1 | Status: SHIPPED | OUTPATIENT
Start: 2021-11-16 | End: 2022-05-15

## 2021-12-08 DIAGNOSIS — E11.65 TYPE 2 DIABETES MELLITUS WITH HYPERGLYCEMIA, WITH LONG-TERM CURRENT USE OF INSULIN (HCC): Primary | ICD-10-CM

## 2021-12-08 DIAGNOSIS — Z79.4 TYPE 2 DIABETES MELLITUS WITH HYPERGLYCEMIA, WITH LONG-TERM CURRENT USE OF INSULIN (HCC): Primary | ICD-10-CM

## 2021-12-08 RX ORDER — PEN NEEDLE, DIABETIC 32GX 5/32"
NEEDLE, DISPOSABLE MISCELLANEOUS DAILY
Qty: 100 EACH | Refills: 3 | Status: SHIPPED | OUTPATIENT
Start: 2021-12-08

## 2021-12-14 ENCOUNTER — TELEPHONE (OUTPATIENT)
Dept: ENDOCRINOLOGY | Facility: CLINIC | Age: 76
End: 2021-12-14

## 2022-01-03 ENCOUNTER — APPOINTMENT (OUTPATIENT)
Dept: LAB | Age: 77
End: 2022-01-03
Payer: MEDICARE

## 2022-01-03 DIAGNOSIS — Z79.4 TYPE 2 DIABETES MELLITUS WITH HYPERGLYCEMIA, WITH LONG-TERM CURRENT USE OF INSULIN (HCC): ICD-10-CM

## 2022-01-03 DIAGNOSIS — E83.52 HYPERCALCEMIA: ICD-10-CM

## 2022-01-03 DIAGNOSIS — E11.65 TYPE 2 DIABETES MELLITUS WITH HYPERGLYCEMIA, WITH LONG-TERM CURRENT USE OF INSULIN (HCC): ICD-10-CM

## 2022-01-03 LAB
ALBUMIN SERPL BCP-MCNC: 3.8 G/DL (ref 3.5–5)
ALP SERPL-CCNC: 74 U/L (ref 46–116)
ALT SERPL W P-5'-P-CCNC: 17 U/L (ref 12–78)
ANION GAP SERPL CALCULATED.3IONS-SCNC: 7 MMOL/L (ref 4–13)
AST SERPL W P-5'-P-CCNC: 18 U/L (ref 5–45)
BILIRUB SERPL-MCNC: 0.61 MG/DL (ref 0.2–1)
BUN SERPL-MCNC: 22 MG/DL (ref 5–25)
CALCIUM SERPL-MCNC: 10.7 MG/DL (ref 8.3–10.1)
CHLORIDE SERPL-SCNC: 106 MMOL/L (ref 100–108)
CO2 SERPL-SCNC: 25 MMOL/L (ref 21–32)
CREAT SERPL-MCNC: 0.84 MG/DL (ref 0.6–1.3)
EST. AVERAGE GLUCOSE BLD GHB EST-MCNC: 183 MG/DL
GFR SERPL CREATININE-BSD FRML MDRD: 68 ML/MIN/1.73SQ M
GLUCOSE P FAST SERPL-MCNC: 103 MG/DL (ref 65–99)
HBA1C MFR BLD: 8 %
PHOSPHATE SERPL-MCNC: 3.2 MG/DL (ref 2.3–4.1)
POTASSIUM SERPL-SCNC: 3.8 MMOL/L (ref 3.5–5.3)
PROT SERPL-MCNC: 7.7 G/DL (ref 6.4–8.2)
PTH-INTACT SERPL-MCNC: 57.4 PG/ML (ref 18.4–80.1)
SODIUM SERPL-SCNC: 138 MMOL/L (ref 136–145)

## 2022-01-03 PROCEDURE — 84100 ASSAY OF PHOSPHORUS: CPT

## 2022-01-03 PROCEDURE — 80053 COMPREHEN METABOLIC PANEL: CPT

## 2022-01-03 PROCEDURE — 36415 COLL VENOUS BLD VENIPUNCTURE: CPT

## 2022-01-03 PROCEDURE — 83036 HEMOGLOBIN GLYCOSYLATED A1C: CPT

## 2022-01-03 PROCEDURE — 83970 ASSAY OF PARATHORMONE: CPT

## 2022-01-06 ENCOUNTER — OFFICE VISIT (OUTPATIENT)
Dept: ENDOCRINOLOGY | Facility: CLINIC | Age: 77
End: 2022-01-06
Payer: MEDICARE

## 2022-01-06 VITALS
HEART RATE: 70 BPM | SYSTOLIC BLOOD PRESSURE: 150 MMHG | HEIGHT: 61 IN | DIASTOLIC BLOOD PRESSURE: 70 MMHG | WEIGHT: 158 LBS | BODY MASS INDEX: 29.83 KG/M2

## 2022-01-06 DIAGNOSIS — Z79.4 TYPE 2 DIABETES MELLITUS WITH HYPERGLYCEMIA, WITH LONG-TERM CURRENT USE OF INSULIN (HCC): Primary | ICD-10-CM

## 2022-01-06 DIAGNOSIS — E78.5 HYPERLIPIDEMIA, UNSPECIFIED HYPERLIPIDEMIA TYPE: ICD-10-CM

## 2022-01-06 DIAGNOSIS — E04.1 THYROID NODULE: ICD-10-CM

## 2022-01-06 DIAGNOSIS — E83.52 HYPERCALCEMIA: ICD-10-CM

## 2022-01-06 DIAGNOSIS — E11.65 TYPE 2 DIABETES MELLITUS WITH HYPERGLYCEMIA, WITH LONG-TERM CURRENT USE OF INSULIN (HCC): Primary | ICD-10-CM

## 2022-01-06 PROCEDURE — 99214 OFFICE O/P EST MOD 30 MIN: CPT | Performed by: PHYSICIAN ASSISTANT

## 2022-01-06 RX ORDER — INSULIN GLARGINE 100 [IU]/ML
INJECTION, SOLUTION SUBCUTANEOUS
Qty: 15 ML
Start: 2022-01-06

## 2022-01-06 NOTE — PROGRESS NOTES
Established Patient Progress Note      Chief Complaint   Patient presents with    Diabetes Type 2        History of Present Illness:   Ingrid Guardado is a 77 y o  female with a history of type 2 diabetes with long term use of insulin since many years ago  Reports complications of microalbuminuria  Denies recent illness or hospitalizations  Denies recent severe hypoglycemic or severe hyperglycemic episodes  Denies any issues with her current regimen except cost  home glucose monitoring: are performed regularly 2x per day and ost readings are low to mid 100s with occasional excursions over 200 at bedtime  1 low of 67 occurred this morning  She knows her diet is the reason her A1C went up  Current regimen:   Trulicity 1 [facility] weekly   Glimepiride 2mg twice per day   Lantus 25 daily   Metformin 1000mg twice per day     Last Eye Exam: UTD  Last Foot Exam: UTD    Has hypertension: Taking losartan HCTZ and propranolol  Has hyperlipidemia: Taking atorvastatin    Has had mild intermittent hypercalcemia  No recent kidney stones, has had distant hx of stones  DEXA showed osteopenia- no fractures  Hx osteoporosis- previously treated with fosamax  Tries to keep hydrated but mostly drinks diet soda  Gets calcium in diet and takes Vitamin D supplement for hx vitamin d deficiency  Hx thyroid nodules, ultrasound stable 5/2021   TSH normal      Patient Active Problem List   Diagnosis    Hyperlipidemia    Hypertension    Microalbuminuria    Thyroid nodule    Tremor    Type 2 diabetes mellitus with hyperglycemia, with long-term current use of insulin (HCC)    Vitamin D deficiency    Bilateral impacted cerumen    Sensorineural hearing loss (SNHL), bilateral    Hypercalcemia      Past Medical History:   Diagnosis Date    [facility] [address] [facility]     [facility] [address] [facility]     Disease of thyroid gland     DVT, bilateral lower limbs (HCC)     Hyperlipidemia     Hypertension     Hypertension     Kidney stones     Renal disorder       Past Surgical History:   Procedure Laterality Date    HYSTERECTOMY      partial      Family History   Problem Relation Age of Onset    Cancer Family     No Known Problems Mother     No Known Problems Father      Social History     Tobacco Use    Smoking status: Never Smoker    Smokeless tobacco: Never Used   Substance Use Topics    Alcohol use: No     Allergies   Allergen Reactions    Sulfa Antibiotics Rash         Current Outpatient Medications:     apixaban (ELIQUIS) 5 mg, Take 5 mg by mouth 2 (two) times a day, Disp: , Rfl:     atorvastatin (LIPITOR) 20 mg tablet, Take by mouth daily at bedtime , Disp: , Rfl:     Blood Glucose Monitoring Suppl (ONETOUCH VERIO) w/Device KIT, Use to check BG  DX E11 9, patient will check blood sugars 3 times daily, Disp: 1 kit, Rfl: 1    Cholecalciferol (Vitamin D3) 50 MCG (2000 UT) capsule, 2000 units twice per day, Disp: 30 capsule, Rfl: 5    Dulaglutide (Trulicity) 4 19 FT/1 7DL SOPN, Inject 0 5 mL (0 75 mg total) under the skin once a week, Disp: 6 mL, Rfl: 1    glimepiride (AMARYL) 2 mg tablet, Take 1 tablet (2 mg total) by mouth 2 (two) times a day, Disp: 180 tablet, Rfl: 3    insulin glargine (Lantus SoloStar) 100 units/mL injection pen, 25 units daily at bedtime, Disp: 30 mL, Rfl: 3    Insulin Pen Needle (BD Pen Needle Carol Ann U/F) 32G X 4 MM MISC, Use daily, Disp: 100 each, Rfl: 3    latanoprost (XALATAN) 0 005 % ophthalmic solution, Administer 1 drop to both eyes daily at bedtime, Disp: , Rfl:     losartan-hydrochlorothiazide (HYZAAR) 100-12 5 MG per tablet, Take by mouth, Disp: , Rfl:     metFORMIN (GLUCOPHAGE) 1000 MG tablet, TAKE 1 TABLET BY MOUTH  TWICE DAILY WITH MEALS, Disp: 180 tablet, Rfl: 3    ONETOUCH DELICA LANCETS 97X MISC, TEST BLOOD SUGARS THREE TIMES DAILY, Disp: , Rfl:     OneTouch Verio test strip, Use 1 each 3 (three) times a day, Disp: 300 each, Rfl: 1    propranolol (INDERAL) 20 mg tablet, Take 20 mg by mouth Two tablets a day, Disp: , Rfl:     insulin glargine (Basaglar KwikPen) 100 units/mL injection pen, 25 units daily in place of lantus, Disp: 15 mL, Rfl:     Review of Systems   Constitutional: Negative for activity change, appetite change, chills, diaphoresis, fatigue, fever and unexpected weight change  HENT: Negative for trouble swallowing and voice change  Eyes: Negative for visual disturbance  Respiratory: Negative for shortness of breath  Cardiovascular: Negative for chest pain and palpitations  Gastrointestinal: Negative for abdominal pain, constipation and diarrhea  Endocrine: Negative for cold intolerance, heat intolerance, polydipsia, polyphagia and polyuria  Genitourinary: Negative for frequency and menstrual problem  Musculoskeletal: Negative for arthralgias and myalgias  Skin: Negative for rash  Allergic/Immunologic: Negative for food allergies  Neurological: Negative for dizziness and tremors  Hematological: Negative for adenopathy  Psychiatric/Behavioral: Negative for sleep disturbance  All other systems reviewed and are negative  Physical Exam:  Body mass index is 29 85 kg/m²  /70 (BP Location: Left arm, Patient Position: Sitting)   Pulse 70   Ht 5' 1" (1 549 m)   Wt 71 7 kg (158 lb)   BMI 29 85 kg/m²    Wt Readings from Last 3 Encounters:   01/06/22 71 7 kg (158 lb)   08/25/21 74 2 kg (163 lb 9 6 oz)   07/29/21 74 4 kg (164 lb)       Physical Exam  Vitals reviewed  Constitutional:       General: She is not in acute distress  Appearance: She is well-developed  HENT:      Head: Normocephalic and atraumatic  Eyes:      Conjunctiva/sclera: Conjunctivae normal       Pupils: Pupils are equal, round, and reactive to light  Neck:      Thyroid: No thyromegaly  Cardiovascular:      Rate and Rhythm: Normal rate and regular rhythm  Heart sounds: Normal heart sounds  Pulmonary:      Effort: Pulmonary effort is normal  No respiratory distress  Breath sounds: Normal breath sounds  No wheezing or rales  Abdominal:      General: Bowel sounds are normal  There is no distension  Palpations: Abdomen is soft  Tenderness: There is no abdominal tenderness  Musculoskeletal:         General: Normal range of motion  Cervical back: Normal range of motion and neck supple  Skin:     General: Skin is warm and dry  Neurological:      Mental Status: She is alert and oriented to person, place, and time  Labs:   Lab Results   Component Value Date    HGBA1C 8 0 (H) 01/03/2022    HGBA1C 7 5 (H) 08/18/2021    HGBA1C 7 4 (A) 05/19/2021     Lab Results   Component Value Date    CREATININE 0 84 01/03/2022    CREATININE 0 82 08/18/2021    CREATININE 0 74 01/07/2021    BUN 22 01/03/2022     09/18/2015    K 3 8 01/03/2022     01/03/2022    CO2 25 01/03/2022     eGFR   Date Value Ref Range Status   01/03/2022 68 ml/min/1 73sq m Final     Lab Results   Component Value Date    CHOL 119 03/26/2015    HDL 37 (L) 08/18/2021    TRIG 200 (H) 08/18/2021     Lab Results   Component Value Date    ALT 17 01/03/2022    AST 18 01/03/2022    ALKPHOS 74 01/03/2022    BILITOT 0 61 09/18/2015     Lab Results   Component Value Date    FQH5UEVKVAUD 1 120 08/18/2021    OKS9NWQHZLIZ 1 670 01/30/2020    ECB3UJCNYGVK 1 300 06/27/2017     Lab Results   Component Value Date    FREET4 0 93 08/18/2021       Impression & Plan:    Problem List Items Addressed This Visit        Endocrine    Thyroid nodule     Stable on 5/2021 ultrasound  Type 2 diabetes mellitus with hyperglycemia, with long-term current use of insulin (HCC) - Primary     Control has worsened based on recent A1C  She reports this is due to dietary indiscretion over past few months    Recent blood sugar readings look great and blood sugar was actually low this morning which is very unusual  Continue same medications for now and send glucose log between visits if problems with hypo/hyperglycemia  She is reporting very high cost of medications  Will fill out patient assistance applications for [facility] for basaglar and trulicity  and her granddaughter is a social will help fill out her part and send to whit      Lab Results   Component Value Date    HGBA1C 8 0 (H) 01/03/2022            Relevant Medications    insulin glargine (Basaglar KwikPen) 100 units/mL injection pen    Other Relevant Orders    Hemoglobin A1C    Comprehensive metabolic panel    Microalbumin / creatinine urine ratio       Other    Hyperlipidemia     Continue atorvastatin  Hypercalcemia     Mild/Stable  May be mild primary hyperparathyroidism and/or HCTZ  Discussed option of monitoring vs additional workup/treatment  She has not had kidney stones and DEXA showed stable osteopenia with no fracture history  Advised her to keep well hydrated and will monitor over time  Relevant Orders    Comprehensive metabolic panel          Orders Placed This Encounter   Procedures    Hemoglobin A1C     Standing Status:   Future     Standing Expiration Date:   1/6/2023    Comprehensive metabolic panel     This is a patient instruction: Patient fasting for 8 hours or longer recommended  Standing Status:   Future     Standing Expiration Date:   1/6/2023    Microalbumin / creatinine urine ratio     Standing Status:   Future     Standing Expiration Date:   1/6/2023       There are no Patient Instructions on file for this visit  Discussed with the patient and all questioned fully answered  She will call me if any problems arise  Follow-up appointment in 4 months       Counseled patient on diagnostic results, prognosis, risk and benefit of treatment options, instruction for management, importance of treatment compliance, Risk  factor reduction and impressions    Torres Limon PA-C

## 2022-01-06 NOTE — ASSESSMENT & PLAN NOTE
Mild/Stable  May be mild primary hyperparathyroidism and/or HCTZ  Discussed option of monitoring vs additional workup/treatment  She has not had kidney stones and DEXA showed stable osteopenia with no fracture history  Advised her to keep well hydrated and will monitor over time

## 2022-01-06 NOTE — ASSESSMENT & PLAN NOTE
Control has worsened based on recent A1C  She reports this is due to dietary indiscretion over past few months  Recent blood sugar readings look great and blood sugar was actually low this morning which is very unusual  Continue same medications for now and send glucose log between visits if problems with hypo/hyperglycemia  She is reporting very high cost of medications  Will fill out patient assistance applications for [facility] for basaglar and trulicity  and her granddaughter is a social will help fill out her part and send to whit       Lab Results   Component Value Date    HGBA1C 8 0 (H) 01/03/2022

## 2022-03-25 DIAGNOSIS — I10 ESSENTIAL HYPERTENSION: Primary | ICD-10-CM

## 2022-03-25 DIAGNOSIS — Z79.4 TYPE 2 DIABETES MELLITUS WITH HYPERGLYCEMIA, WITH LONG-TERM CURRENT USE OF INSULIN (HCC): ICD-10-CM

## 2022-03-25 DIAGNOSIS — E11.65 TYPE 2 DIABETES MELLITUS WITH HYPERGLYCEMIA, WITH LONG-TERM CURRENT USE OF INSULIN (HCC): ICD-10-CM

## 2022-03-25 RX ORDER — LOSARTAN POTASSIUM AND HYDROCHLOROTHIAZIDE 12.5; 1 MG/1; MG/1
1 TABLET ORAL DAILY
Qty: 90 TABLET | Refills: 1 | Status: SHIPPED | OUTPATIENT
Start: 2022-03-25

## 2022-04-13 DIAGNOSIS — E11.65 TYPE 2 DIABETES MELLITUS WITH HYPERGLYCEMIA, WITH LONG-TERM CURRENT USE OF INSULIN (HCC): ICD-10-CM

## 2022-04-13 DIAGNOSIS — Z79.4 TYPE 2 DIABETES MELLITUS WITH HYPERGLYCEMIA, WITH LONG-TERM CURRENT USE OF INSULIN (HCC): ICD-10-CM

## 2022-04-13 RX ORDER — GLIMEPIRIDE 2 MG/1
TABLET ORAL
Qty: 180 TABLET | Refills: 3 | Status: SHIPPED | OUTPATIENT
Start: 2022-04-13

## 2022-05-05 ENCOUNTER — APPOINTMENT (OUTPATIENT)
Dept: LAB | Age: 77
End: 2022-05-05
Payer: MEDICARE

## 2022-05-05 DIAGNOSIS — Z79.4 TYPE 2 DIABETES MELLITUS WITH HYPERGLYCEMIA, WITH LONG-TERM CURRENT USE OF INSULIN (HCC): ICD-10-CM

## 2022-05-05 DIAGNOSIS — E78.5 HYPERLIPIDEMIA, UNSPECIFIED HYPERLIPIDEMIA TYPE: ICD-10-CM

## 2022-05-05 DIAGNOSIS — E13.69 OTHER SPECIFIED DIABETES MELLITUS WITH OTHER SPECIFIED COMPLICATION, UNSPECIFIED WHETHER LONG TERM INSULIN USE (HCC): ICD-10-CM

## 2022-05-05 DIAGNOSIS — E83.52 HYPERCALCEMIA: ICD-10-CM

## 2022-05-05 DIAGNOSIS — E11.65 TYPE 2 DIABETES MELLITUS WITH HYPERGLYCEMIA, WITH LONG-TERM CURRENT USE OF INSULIN (HCC): ICD-10-CM

## 2022-05-05 DIAGNOSIS — E55.9 AVITAMINOSIS D: ICD-10-CM

## 2022-05-05 LAB
25(OH)D3 SERPL-MCNC: 59.1 NG/ML (ref 30–100)
ALBUMIN SERPL BCP-MCNC: 3.5 G/DL (ref 3.5–5)
ALP SERPL-CCNC: 73 U/L (ref 46–116)
ALT SERPL W P-5'-P-CCNC: 19 U/L (ref 12–78)
ANION GAP SERPL CALCULATED.3IONS-SCNC: 5 MMOL/L (ref 4–13)
AST SERPL W P-5'-P-CCNC: 17 U/L (ref 5–45)
BILIRUB SERPL-MCNC: 0.54 MG/DL (ref 0.2–1)
BUN SERPL-MCNC: 22 MG/DL (ref 5–25)
CALCIUM SERPL-MCNC: 10.2 MG/DL (ref 8.3–10.1)
CHLORIDE SERPL-SCNC: 104 MMOL/L (ref 100–108)
CHOLEST SERPL-MCNC: 137 MG/DL
CO2 SERPL-SCNC: 29 MMOL/L (ref 21–32)
CREAT SERPL-MCNC: 0.85 MG/DL (ref 0.6–1.3)
CREAT UR-MCNC: 136 MG/DL
EST. AVERAGE GLUCOSE BLD GHB EST-MCNC: 166 MG/DL
GFR SERPL CREATININE-BSD FRML MDRD: 67 ML/MIN/1.73SQ M
GLUCOSE P FAST SERPL-MCNC: 112 MG/DL (ref 65–99)
HBA1C MFR BLD: 7.4 %
HDLC SERPL-MCNC: 40 MG/DL
LDLC SERPL CALC-MCNC: 56 MG/DL (ref 0–100)
MICROALBUMIN UR-MCNC: 96.3 MG/L (ref 0–20)
MICROALBUMIN/CREAT 24H UR: 71 MG/G CREATININE (ref 0–30)
NONHDLC SERPL-MCNC: 97 MG/DL
POTASSIUM SERPL-SCNC: 4.4 MMOL/L (ref 3.5–5.3)
PROT SERPL-MCNC: 7 G/DL (ref 6.4–8.2)
SODIUM SERPL-SCNC: 138 MMOL/L (ref 136–145)
TRIGL SERPL-MCNC: 203 MG/DL

## 2022-05-05 PROCEDURE — 36415 COLL VENOUS BLD VENIPUNCTURE: CPT

## 2022-05-05 PROCEDURE — 83036 HEMOGLOBIN GLYCOSYLATED A1C: CPT

## 2022-05-05 PROCEDURE — 82570 ASSAY OF URINE CREATININE: CPT

## 2022-05-05 PROCEDURE — 80061 LIPID PANEL: CPT

## 2022-05-05 PROCEDURE — 80053 COMPREHEN METABOLIC PANEL: CPT

## 2022-05-05 PROCEDURE — 82043 UR ALBUMIN QUANTITATIVE: CPT

## 2022-05-05 PROCEDURE — 82306 VITAMIN D 25 HYDROXY: CPT

## 2022-05-11 ENCOUNTER — TELEPHONE (OUTPATIENT)
Dept: ENDOCRINOLOGY | Facility: CLINIC | Age: 77
End: 2022-05-11

## 2022-05-11 ENCOUNTER — DOCUMENTATION (OUTPATIENT)
Dept: ENDOCRINOLOGY | Facility: CLINIC | Age: 77
End: 2022-05-11

## 2022-05-11 NOTE — TELEPHONE ENCOUNTER
Recent morning readings have been running low  Change glimepiride from 2mg twice per day to 2mg with breakfast only  Send another log in 1-2 weeks

## 2022-05-27 ENCOUNTER — TELEPHONE (OUTPATIENT)
Dept: ENDOCRINOLOGY | Facility: CLINIC | Age: 77
End: 2022-05-27

## 2022-06-02 ENCOUNTER — TELEPHONE (OUTPATIENT)
Dept: ENDOCRINOLOGY | Facility: CLINIC | Age: 77
End: 2022-06-02

## 2022-06-02 NOTE — TELEPHONE ENCOUNTER
Covering for out of office provider:    Current regimen:    Trulicity 6 52 mg once weekly  Basaglar 25 units nightly  Metformin 1,000 mg BID  Glimepiride 2 mg daily with breakfast    Overall, fasting blood sugars are stable  Occasional nighttime highs  No changes recommended to medication regimen at this time  Continue to focus on a healthy diet

## 2022-07-28 ENCOUNTER — ESTABLISHED COMPREHENSIVE EXAM (OUTPATIENT)
Dept: URBAN - METROPOLITAN AREA CLINIC 6 | Facility: CLINIC | Age: 77
End: 2022-07-28

## 2022-07-28 DIAGNOSIS — H00.033: ICD-10-CM

## 2022-07-28 DIAGNOSIS — H40.023: ICD-10-CM

## 2022-07-28 DIAGNOSIS — H25.813: ICD-10-CM

## 2022-07-28 PROCEDURE — 92012 INTRM OPH EXAM EST PATIENT: CPT

## 2022-07-28 ASSESSMENT — TONOMETRY
OS_IOP_MMHG: 23
OD_IOP_MMHG: 20
OD_IOP_MMHG: 19
OS_IOP_MMHG: 28

## 2022-07-28 ASSESSMENT — VISUAL ACUITY
OS_CC: 20/30-2
OD_CC: 20/40
OU_CC: J2

## 2022-08-09 ENCOUNTER — FOLLOW UP (OUTPATIENT)
Dept: URBAN - METROPOLITAN AREA CLINIC 6 | Facility: CLINIC | Age: 77
End: 2022-08-09

## 2022-08-09 DIAGNOSIS — H40.023: ICD-10-CM

## 2022-08-09 DIAGNOSIS — H00.033: ICD-10-CM

## 2022-08-09 PROCEDURE — 92020 GONIOSCOPY: CPT

## 2022-08-09 PROCEDURE — 92012 INTRM OPH EXAM EST PATIENT: CPT

## 2022-08-09 ASSESSMENT — TONOMETRY
OS_IOP_MMHG: 19
OD_IOP_MMHG: 15
OD_IOP_MMHG: 17
OS_IOP_MMHG: 20

## 2022-08-09 ASSESSMENT — VISUAL ACUITY
OD_CC: 20/40-1
OS_CC: 20/30-2
OU_CC: J1

## 2022-11-22 ENCOUNTER — FOLLOW UP (OUTPATIENT)
Dept: URBAN - METROPOLITAN AREA CLINIC 6 | Facility: CLINIC | Age: 77
End: 2022-11-22

## 2022-11-22 DIAGNOSIS — Z79.4: ICD-10-CM

## 2022-11-22 DIAGNOSIS — E11.3292: ICD-10-CM

## 2022-11-22 DIAGNOSIS — I10: ICD-10-CM

## 2022-11-22 DIAGNOSIS — H40.023: ICD-10-CM

## 2022-11-22 DIAGNOSIS — H25.813: ICD-10-CM

## 2022-11-22 LAB
LEFT EYE DIABETIC RETINOPATHY: POSITIVE
RIGHT EYE DIABETIC RETINOPATHY: POSITIVE

## 2022-11-22 PROCEDURE — 92083 EXTENDED VISUAL FIELD XM: CPT

## 2022-11-22 PROCEDURE — 92014 COMPRE OPH EXAM EST PT 1/>: CPT

## 2022-11-22 PROCEDURE — 92133 CPTRZD OPH DX IMG PST SGM ON: CPT

## 2022-11-22 ASSESSMENT — VISUAL ACUITY
OS_CC: 20/25
OD_CC: 20/30
OU_CC: J2
OU_CC: 20/20

## 2022-11-22 ASSESSMENT — TONOMETRY
OD_IOP_MMHG: 15
OS_IOP_MMHG: 18

## 2023-01-17 ENCOUNTER — TELEPHONE (OUTPATIENT)
Dept: ENDOCRINOLOGY | Facility: CLINIC | Age: 78
End: 2023-01-17

## 2023-01-17 DIAGNOSIS — E78.5 HYPERLIPIDEMIA, UNSPECIFIED HYPERLIPIDEMIA TYPE: ICD-10-CM

## 2023-01-17 DIAGNOSIS — E11.65 TYPE 2 DIABETES MELLITUS WITH HYPERGLYCEMIA, WITH LONG-TERM CURRENT USE OF INSULIN (HCC): Primary | ICD-10-CM

## 2023-01-17 DIAGNOSIS — Z79.4 TYPE 2 DIABETES MELLITUS WITH HYPERGLYCEMIA, WITH LONG-TERM CURRENT USE OF INSULIN (HCC): Primary | ICD-10-CM

## 2023-01-17 NOTE — TELEPHONE ENCOUNTER
Pt would like to fill out a new application for his Trulicty and Basaglar and mail application to patient

## 2023-01-17 NOTE — TELEPHONE ENCOUNTER
Labs ordered  Could you please  start the application? Thank You  If she is having any problems with blood sugars would suggest waiting until visit to do application if possible in case dose adjustments needed

## 2023-01-17 NOTE — TELEPHONE ENCOUNTER
Spoke to patient she wants us to sent her application to [employer] her appointment is not until almost the end of February    Patient will send her BS log to see if you will need to adjust her Insulin

## 2023-02-17 ENCOUNTER — APPOINTMENT (OUTPATIENT)
Dept: LAB | Age: 78
End: 2023-02-17

## 2023-02-17 DIAGNOSIS — Z79.4 TYPE 2 DIABETES MELLITUS WITH HYPERGLYCEMIA, WITH LONG-TERM CURRENT USE OF INSULIN (HCC): ICD-10-CM

## 2023-02-17 DIAGNOSIS — E78.5 HYPERLIPIDEMIA, UNSPECIFIED HYPERLIPIDEMIA TYPE: ICD-10-CM

## 2023-02-17 DIAGNOSIS — E11.65 TYPE 2 DIABETES MELLITUS WITH HYPERGLYCEMIA, WITH LONG-TERM CURRENT USE OF INSULIN (HCC): ICD-10-CM

## 2023-02-17 LAB
ALBUMIN SERPL BCP-MCNC: 3.4 G/DL (ref 3.5–5)
ALP SERPL-CCNC: 65 U/L (ref 46–116)
ALT SERPL W P-5'-P-CCNC: 14 U/L (ref 12–78)
ANION GAP SERPL CALCULATED.3IONS-SCNC: 9 MMOL/L (ref 4–13)
AST SERPL W P-5'-P-CCNC: 13 U/L (ref 5–45)
BILIRUB SERPL-MCNC: 0.55 MG/DL (ref 0.2–1)
BUN SERPL-MCNC: 21 MG/DL (ref 5–25)
CALCIUM ALBUM COR SERPL-MCNC: 10.1 MG/DL (ref 8.3–10.1)
CALCIUM SERPL-MCNC: 9.6 MG/DL (ref 8.3–10.1)
CHLORIDE SERPL-SCNC: 102 MMOL/L (ref 96–108)
CHOLEST SERPL-MCNC: 109 MG/DL
CO2 SERPL-SCNC: 24 MMOL/L (ref 21–32)
CREAT SERPL-MCNC: 0.7 MG/DL (ref 0.6–1.3)
CREAT UR-MCNC: 43.8 MG/DL
GFR SERPL CREATININE-BSD FRML MDRD: 84 ML/MIN/1.73SQ M
GLUCOSE P FAST SERPL-MCNC: 104 MG/DL (ref 65–99)
HDLC SERPL-MCNC: 42 MG/DL
LDLC SERPL CALC-MCNC: 26 MG/DL (ref 0–100)
MICROALBUMIN UR-MCNC: 85.6 MG/L (ref 0–20)
MICROALBUMIN/CREAT 24H UR: 195 MG/G CREATININE (ref 0–30)
POTASSIUM SERPL-SCNC: 4.1 MMOL/L (ref 3.5–5.3)
PROT SERPL-MCNC: 7.1 G/DL (ref 6.4–8.4)
SODIUM SERPL-SCNC: 135 MMOL/L (ref 135–147)
T4 FREE SERPL-MCNC: 1.01 NG/DL (ref 0.76–1.46)
TRIGL SERPL-MCNC: 203 MG/DL
TSH SERPL DL<=0.05 MIU/L-ACNC: 1.68 UIU/ML (ref 0.45–4.5)

## 2023-02-19 LAB
EST. AVERAGE GLUCOSE BLD GHB EST-MCNC: 166 MG/DL
HBA1C MFR BLD: 7.4 %

## 2023-02-23 ENCOUNTER — OFFICE VISIT (OUTPATIENT)
Dept: ENDOCRINOLOGY | Facility: CLINIC | Age: 78
End: 2023-02-23

## 2023-02-23 ENCOUNTER — TELEPHONE (OUTPATIENT)
Dept: ADMINISTRATIVE | Facility: OTHER | Age: 78
End: 2023-02-23

## 2023-02-23 VITALS
BODY MASS INDEX: 29.83 KG/M2 | HEART RATE: 70 BPM | HEIGHT: 61 IN | SYSTOLIC BLOOD PRESSURE: 128 MMHG | WEIGHT: 158 LBS | DIASTOLIC BLOOD PRESSURE: 90 MMHG

## 2023-02-23 DIAGNOSIS — E55.9 VITAMIN D DEFICIENCY: ICD-10-CM

## 2023-02-23 DIAGNOSIS — E11.65 TYPE 2 DIABETES MELLITUS WITH HYPERGLYCEMIA, WITH LONG-TERM CURRENT USE OF INSULIN (HCC): ICD-10-CM

## 2023-02-23 DIAGNOSIS — Z79.4 TYPE 2 DIABETES MELLITUS WITH HYPERGLYCEMIA, WITH LONG-TERM CURRENT USE OF INSULIN (HCC): ICD-10-CM

## 2023-02-23 DIAGNOSIS — E83.52 HYPERCALCEMIA: Primary | ICD-10-CM

## 2023-02-23 DIAGNOSIS — E04.1 THYROID NODULE: ICD-10-CM

## 2023-02-23 DIAGNOSIS — R80.9 MICROALBUMINURIA: ICD-10-CM

## 2023-02-23 DIAGNOSIS — E78.5 HYPERLIPIDEMIA, UNSPECIFIED HYPERLIPIDEMIA TYPE: ICD-10-CM

## 2023-02-23 RX ORDER — BLOOD SUGAR DIAGNOSTIC
STRIP MISCELLANEOUS
Qty: 300 EACH | Refills: 1 | Status: SHIPPED | OUTPATIENT
Start: 2023-02-23

## 2023-02-23 NOTE — LETTER
Diabetic Eye Exam Form    Date Requested: 23  Patient: Hammad Villarreal  Patient : 1945   Referring Provider: Frankie Cuello MD      DIABETIC Eye Exam Date _______________________________      Type of Exam MUST be documented for Diabetic Eye Exams  Please CHECK ONE  Retinal Exam       Dilated Retinal Exam       OCT       Optomap-Iris Exam      Fundus Photography       Left Eye - Please check Retinopathy or No Retinopathy        Exam did show retinopathy    Exam did not show retinopathy       Right Eye - Please check Retinopathy or No Retinopathy       Exam did show retinopathy    Exam did not show retinopathy       Comments __________________________________________________________    Practice Providing Exam ______________________________________________    Exam Performed By (print name) _______________________________________      Provider Signature ___________________________________________________      These reports are needed for  compliance  Please fax this completed form and a copy of the Diabetic Eye Exam report to our office located at [place] [address] as soon as possible via Fax [phone] arely Stover: Phone [phone]  We thank you for your assistance in treating our mutual patient     [address] [phone] F [phone]

## 2023-02-23 NOTE — TELEPHONE ENCOUNTER
----- Message from Judah Reddy sent at 2/23/2023 10:29 AM EST -----  Regarding: HM DM EYE EXAM  02/23/23 10:29 AM    Hello, our patient Jose R Valdes has had Diabetic Eye Exam completed/performed   Please assist in updating the patient chart by [address] (Ophthalmology) Phone: [phone] Fax: [address]         Thank you,  Crissy Jackson  [facility]

## 2023-02-23 NOTE — ASSESSMENT & PLAN NOTE
Diabetes control is stable given her age and comorbidities and she now lives alone the goal is to avoid hypoglycemia  Discussed option of CGM for personal use-- she will discuss with granddaugther and let us know if she is interested  She has recently send patient assistance application to [employer] for basaglar and Trulicity  Advised her to contact the office if she is going to run out of medication and we can figure out alternative while waiting for medication to arrive     Lab Results   Component Value Date    HGBA1C 7 4 (H) 02/17/2023

## 2023-02-23 NOTE — PROGRESS NOTES
Established Patient Progress Note      Chief Complaint   Patient presents with   • Diabetes Type 2        History of Present Illness:   Nathalie Price is a 69 y o  female with a history of type 2 diabetes with long term use of insulin since many years ago  Reports complications of microalbuminuria  Denies recent illness or hospitalizations  Denies recent severe hypoglycemic or severe hyperglycemic episodes  Denies any issues with her current regimen  home glucose monitoring: are performed regularly  She does feel like blood sugars have been a little higher since glimepiride dosing was reduced after last visit  Hypoglycemia is rare but she does like to make sure her beditme reading is a little high at night to avoid lows  Most blood sugar range 100-170  There was 1 low of 64 and 1 high of 210  She sent patient assistance application to [employer] but waiting on response/supplies  Since last visit, her   of an MI-- [facility]  Current regimen:   Trulicity   75mg weekly   Glimepiride 2mg with breakfast  Metformin 1000mg twice per day   Basaglar 25 units daily     Last Eye Exam: 2021 exam showed no retinopathy  Last Foot Exam: today at visit    Has hypertension: Taking propranolol and losartan HCTZ  Has hyperlipidemia: Taking atorvastatin       Hx osteoporosis- previously treated with fosamax  Most recent DEXA in osteopenia range Denies fracture  Has had mild intermittent hypercalcemia  No recent kidney stones, has had distant hx of stonTries to keep hydrated  Gets calcium in diet (milk, yogurt) and takes Vitamin D supplement for hx vitamin d deficiency       Hx thyroid nodules, ultrasound stable 2021   TSH normal    No dysphagia   e    Patient Active Problem List   Diagnosis   • Hyperlipidemia   • Hypertension   • Microalbuminuria   • Thyroid nodule   • Tremor   • Type 2 diabetes mellitus with hyperglycemia, with long-term current use of insulin ([facility]) • Vitamin D deficiency   • Bilateral impacted cerumen   • Sensorineural hearing loss (SNHL), bilateral   • Hypercalcemia      Past Medical History:   Diagnosis Date   • Diabetes ([facility] )    • Diabetes mellitus ([facility] )    • Disease of thyroid gland    • [facility]    • Hyperlipidemia    • Hypertension    • Hypertension    • Kidney stones    • Renal disorder       Past Surgical History:   Procedure Laterality Date   • HYSTERECTOMY      partial      Family History   Problem Relation Age of Onset   • Cancer Family    • No Known Problems Mother    • No Known Problems Father      Social History     Tobacco Use   • Smoking status: Never   • Smokeless tobacco: Never   Substance Use Topics   • Alcohol use: No     Allergies   Allergen Reactions   • Sulfa Antibiotics Rash         Current Outpatient Medications:   •  apixaban (ELIQUIS) 5 mg, Take 5 mg by mouth 2 (two) times a day, Disp: , Rfl:   •  atorvastatin (LIPITOR) 20 mg tablet, Take by mouth daily at bedtime , Disp: , Rfl:   •  Blood Glucose Monitoring Suppl (ONETOUCH VERIO) w/Device KIT, Use to check BG  DX E11 9, patient will check blood sugars 3 times daily, Disp: 1 kit, Rfl: 1  •  Cholecalciferol (Vitamin D3) 50 MCG (2000 UT) capsule, 2000 units twice per day, Disp: 30 capsule, Rfl: 5  •  Dulaglutide (Trulicity) 4 34 GZ/5 8CB SOPN, Inject 0 5 mL (0 75 mg total) under the skin once a week, Disp: 6 mL, Rfl: 1  •  glimepiride (AMARYL) 2 mg tablet, TAKE 1 TABLET BY MOUTH  TWICE DAILY (Patient taking differently: Take 2 mg by mouth daily with breakfast), Disp: 180 tablet, Rfl: 3  •  insulin glargine (Basaglar KwikPen) 100 units/mL injection pen, 25 units daily in place of lantus, Disp: 15 mL, Rfl:   •  Insulin Pen Needle (BD Pen Needle Carol Ann U/F) 32G X 4 MM MISC, Use daily, Disp: 100 each, Rfl: 3  •  latanoprost (XALATAN) 0 005 % ophthalmic solution, Administer 1 drop to both eyes daily at bedtime, Disp: , Rfl:   •  losartan-hydrochlorothiazide (HYZAAR) 100-12 5 MG per tablet, Take 1 tablet by mouth daily, Disp: 90 tablet, Rfl: 1  •  metFORMIN (GLUCOPHAGE) 1000 MG tablet, Take 1 tablet (1,000 mg total) by mouth 2 (two) times a day with meals, Disp: 180 tablet, Rfl: 3  •  ONETOUCH DELICA LANCETS 80O MISC, TEST BLOOD SUGARS THREE TIMES DAILY, Disp: , Rfl:   •  OneTouch Verio test strip, Check BG 3x per day DX E11 9, Disp: 300 each, Rfl: 1  •  propranolol (INDERAL) 20 mg tablet, Take 20 mg by mouth Two tablets a day, Disp: , Rfl:     Review of Systems   Constitutional: Negative for activity change, appetite change, chills, diaphoresis, fatigue, fever and unexpected weight change  HENT: Negative for trouble swallowing and voice change  Eyes: Negative for visual disturbance  Respiratory: Negative for shortness of breath  Cardiovascular: Negative for chest pain and palpitations  Gastrointestinal: Negative for abdominal pain, constipation and diarrhea  Endocrine: Negative for cold intolerance, heat intolerance, polydipsia, polyphagia and polyuria  Genitourinary: Negative for frequency and menstrual problem  Musculoskeletal: Negative for arthralgias and myalgias  Skin: Negative for rash  Allergic/Immunologic: Negative for food allergies  Neurological: Positive for tremors  Negative for dizziness  Hematological: Negative for adenopathy  Psychiatric/Behavioral: Negative for sleep disturbance  All other systems reviewed and are negative  Physical Exam:  Body mass index is 29 85 kg/m²  /90 (BP Location: Left arm, Patient Position: Sitting, Cuff Size: Standard)   Pulse 70   Ht 5' 1" (1 549 m)   Wt 71 7 kg (158 lb)   BMI 29 85 kg/m²    Wt Readings from Last 3 Encounters:   02/23/23 71 7 kg (158 lb)   01/06/22 71 7 kg (158 lb)   08/25/21 74 2 kg (163 lb 9 6 oz)       Physical Exam  Vitals reviewed  Constitutional:       General: She is not in acute distress  Appearance: She is well-developed     HENT:      Head: Normocephalic and atraumatic  Eyes:      Conjunctiva/sclera: Conjunctivae normal       Pupils: Pupils are equal, round, and reactive to light  Neck:      Thyroid: No thyromegaly  Cardiovascular:      Rate and Rhythm: Normal rate and regular rhythm  Pulses: no weak pulses          Dorsalis pedis pulses are 2+ on the right side and 2+ on the left side  Posterior tibial pulses are 2+ on the right side and 2+ on the left side  Heart sounds: Normal heart sounds  Pulmonary:      Effort: Pulmonary effort is normal  No respiratory distress  Breath sounds: Normal breath sounds  No wheezing or rales  Abdominal:      General: Bowel sounds are normal  There is no distension  Palpations: Abdomen is soft  Tenderness: There is no abdominal tenderness  Musculoskeletal:         General: Normal range of motion  Cervical back: Normal range of motion and neck supple  Feet:      Right foot:      Skin integrity: No ulcer, skin breakdown, erythema, warmth, callus or dry skin  Left foot:      Skin integrity: No ulcer, skin breakdown, erythema, warmth, callus or dry skin  Skin:     General: Skin is warm and dry  Neurological:      Mental Status: She is alert and oriented to person, place, and time  Patient's shoes and socks removed  Right Foot/Ankle   Right Foot Inspection  Skin Exam: skin normal and skin intact  No dry skin, no warmth, no callus, no erythema, no maceration, no abnormal color, no pre-ulcer, no ulcer and no callus  Toe Exam: ROM and strength within normal limits  No swelling, no tenderness, erythema and  no right toe deformity    Sensory   Monofilament testing: intact    Vascular  Capillary refills: < 3 seconds  The right DP pulse is 2+  The right PT pulse is 2+  Left Foot/Ankle  Left Foot Inspection  Skin Exam: skin normal and skin intact  No dry skin, no warmth, no erythema, no maceration, normal color, no pre-ulcer, no ulcer and no callus       Toe Exam: ROM and strength within normal limits  No swelling, no tenderness, no erythema and no left toe deformity  Sensory   Monofilament testing: intact    Vascular  Capillary refills: < 3 seconds  The left DP pulse is 2+  The left PT pulse is 2+  Assign Risk Category  No deformity present  No loss of protective sensation  No weak pulses  Risk: 0      Labs:   Lab Results   Component Value Date    HGBA1C 7 4 (H) 02/17/2023    HGBA1C 7 4 (H) 05/05/2022    HGBA1C 8 0 (H) 01/03/2022     Lab Results   Component Value Date    CREATININE 0 70 02/17/2023    CREATININE 0 85 05/05/2022    CREATININE 0 84 01/03/2022    BUN 21 02/17/2023     09/18/2015    K 4 1 02/17/2023     02/17/2023    CO2 24 02/17/2023     eGFR   Date Value Ref Range Status   02/17/2023 84 ml/min/1 73sq m Final     Lab Results   Component Value Date    CHOL 119 03/26/2015    HDL 42 (L) 02/17/2023    TRIG 203 (H) 02/17/2023     Lab Results   Component Value Date    ALT 14 02/17/2023    AST 13 02/17/2023    ALKPHOS 65 02/17/2023    BILITOT 0 61 09/18/2015     Lab Results   Component Value Date    WRD3RVFBBENH 1 680 02/17/2023    UIV1FQKKWVSI 1 120 08/18/2021    SCD7KJJULHIG 1 670 01/30/2020     Lab Results   Component Value Date    FREET4 1 01 02/17/2023       Impression & Plan:    Problem List Items Addressed This Visit        Endocrine    Thyroid nodule     This has been stable x 5 years no additional testing needed  TSH is normal           Type 2 diabetes mellitus with hyperglycemia, with long-term current use of insulin (HCC)     Diabetes control is stable given her age and comorbidities and she now lives alone the goal is to avoid hypoglycemia  Discussed option of CGM for personal use-- she will discuss with granddaugther and let us know if she is interested  She has recently send patient assistance application to [employer] for basaglar and Trulicity   Advised her to contact the office if she is going to run out of medication and we can figure out alternative while waiting for medication to arrive  Lab Results   Component Value Date    HGBA1C 7 4 (H) 02/17/2023            Relevant Medications    OneTouch Verio test strip    Other Relevant Orders    Hemoglobin A1C       Other    Hyperlipidemia     Continue atorvastatin  Microalbuminuria     Continue losartan  Vitamin D deficiency     Continue supplement  Hypercalcemia - Primary     Mild/stable  Will continue to monitor  Advised her to keep well hydrated  Relevant Orders    Comprehensive metabolic panel    PTH, intact       Orders Placed This Encounter   Procedures   • Hemoglobin A1C     Standing Status:   Future     Standing Expiration Date:   2/23/2024   • Comprehensive metabolic panel     This is a patient instruction: Patient fasting for 8 hours or longer recommended  Standing Status:   Future     Standing Expiration Date:   2/23/2024   • PTH, intact     Standing Status:   Future     Standing Expiration Date:   2/23/2024       There are no Patient Instructions on file for this visit  Discussed with the patient and all questioned fully answered  She will call me if any problems arise  Follow-up appointment in 6 months       Counseled patient on diagnostic results, prognosis, risk and benefit of treatment options, instruction for management, importance of treatment compliance, Risk  factor reduction and impressions    Mackenzie Thompson PA-C

## 2023-03-02 NOTE — TELEPHONE ENCOUNTER
Upon review of the In Basket request and the patient's chart, initial outreach has been made via fax to facility  Please see Contacts section for details       Thank you  Chalino Peralta

## 2023-03-06 NOTE — TELEPHONE ENCOUNTER
Upon review of the In Basket request we were able to locate, review, and update the patient chart as requested for Diabetic Eye Exam     Any additional questions or concerns should be emailed to the Practice Liaisons via the appropriate education email address, please do not reply via In Basket      Thank you  Rika Sexton

## 2023-03-15 DIAGNOSIS — E11.65 TYPE 2 DIABETES MELLITUS WITH HYPERGLYCEMIA, WITH LONG-TERM CURRENT USE OF INSULIN (HCC): ICD-10-CM

## 2023-03-15 DIAGNOSIS — Z79.4 TYPE 2 DIABETES MELLITUS WITH HYPERGLYCEMIA, WITH LONG-TERM CURRENT USE OF INSULIN (HCC): ICD-10-CM

## 2023-03-15 RX ORDER — DULAGLUTIDE 0.75 MG/.5ML
0.75 INJECTION, SOLUTION SUBCUTANEOUS WEEKLY
Qty: 2 ML | Refills: 0 | Status: SHIPPED | OUTPATIENT
Start: 2023-03-15 | End: 2023-04-14

## 2023-03-23 DIAGNOSIS — E11.65 TYPE 2 DIABETES MELLITUS WITH HYPERGLYCEMIA, WITH LONG-TERM CURRENT USE OF INSULIN (HCC): ICD-10-CM

## 2023-03-23 DIAGNOSIS — Z79.4 TYPE 2 DIABETES MELLITUS WITH HYPERGLYCEMIA, WITH LONG-TERM CURRENT USE OF INSULIN (HCC): ICD-10-CM

## 2023-03-23 RX ORDER — BLOOD SUGAR DIAGNOSTIC
STRIP MISCELLANEOUS
Qty: 300 EACH | Refills: 1 | Status: SHIPPED | OUTPATIENT
Start: 2023-03-23

## 2023-03-23 NOTE — TELEPHONE ENCOUNTER
Pt called this morning she would like us to sent her [place] for her test strip [place] [facility] [place]

## 2023-03-28 ENCOUNTER — TELEPHONE (OUTPATIENT)
Dept: ENDOCRINOLOGY | Facility: CLINIC | Age: 78
End: 2023-03-28

## 2023-03-28 NOTE — TELEPHONE ENCOUNTER
Fax received from [facility] - form needed to resent filemon office note  Fax form with office note to [phone]

## 2023-05-03 ENCOUNTER — TELEPHONE (OUTPATIENT)
Dept: ENDOCRINOLOGY | Facility: CLINIC | Age: 78
End: 2023-05-03

## 2023-05-03 NOTE — TELEPHONE ENCOUNTER
Received fax from [facility] requesting office notes for the month of April  Faxed them the last note when the pt was last seen to [phone]

## 2023-05-24 ENCOUNTER — IOP CHECK (OUTPATIENT)
Dept: URBAN - METROPOLITAN AREA CLINIC 6 | Facility: CLINIC | Age: 78
End: 2023-05-24

## 2023-05-24 DIAGNOSIS — H40.023: ICD-10-CM

## 2023-05-24 DIAGNOSIS — H25.813: ICD-10-CM

## 2023-05-24 PROCEDURE — 92012 INTRM OPH EXAM EST PATIENT: CPT

## 2023-05-24 PROCEDURE — 92015 DETERMINE REFRACTIVE STATE: CPT

## 2023-05-24 ASSESSMENT — TONOMETRY
OD_IOP_MMHG: 16
OS_IOP_MMHG: 17

## 2023-05-24 ASSESSMENT — VISUAL ACUITY
OS_CC: 20/25-2
OD_CC: 20/30

## 2023-06-15 ENCOUNTER — TELEPHONE (OUTPATIENT)
Dept: ENDOCRINOLOGY | Facility: CLINIC | Age: 78
End: 2023-06-15

## 2023-06-15 NOTE — TELEPHONE ENCOUNTER
Received fax from [facility] that they needs chart notes from prior date of service from  3/24/23, so I faxed  2/23/23 and also 1/6/22 office notes with labs to [phone]

## 2023-08-18 ENCOUNTER — APPOINTMENT (OUTPATIENT)
Dept: LAB | Age: 78
End: 2023-08-18
Payer: MEDICARE

## 2023-08-18 DIAGNOSIS — E83.52 HYPERCALCEMIA: ICD-10-CM

## 2023-08-18 DIAGNOSIS — E11.65 TYPE 2 DIABETES MELLITUS WITH HYPERGLYCEMIA, WITH LONG-TERM CURRENT USE OF INSULIN (HCC): ICD-10-CM

## 2023-08-18 DIAGNOSIS — Z79.4 TYPE 2 DIABETES MELLITUS WITH HYPERGLYCEMIA, WITH LONG-TERM CURRENT USE OF INSULIN (HCC): ICD-10-CM

## 2023-08-18 LAB
ALBUMIN SERPL BCP-MCNC: 3.5 G/DL (ref 3.5–5)
ALP SERPL-CCNC: 64 U/L (ref 46–116)
ALT SERPL W P-5'-P-CCNC: 14 U/L (ref 12–78)
ANION GAP SERPL CALCULATED.3IONS-SCNC: 5 MMOL/L
AST SERPL W P-5'-P-CCNC: 12 U/L (ref 5–45)
BILIRUB SERPL-MCNC: 0.52 MG/DL (ref 0.2–1)
BUN SERPL-MCNC: 30 MG/DL (ref 5–25)
CALCIUM SERPL-MCNC: 10.3 MG/DL (ref 8.3–10.1)
CHLORIDE SERPL-SCNC: 105 MMOL/L (ref 96–108)
CO2 SERPL-SCNC: 27 MMOL/L (ref 21–32)
CREAT SERPL-MCNC: 0.87 MG/DL (ref 0.6–1.3)
EST. AVERAGE GLUCOSE BLD GHB EST-MCNC: 174 MG/DL
GFR SERPL CREATININE-BSD FRML MDRD: 64 ML/MIN/1.73SQ M
GLUCOSE P FAST SERPL-MCNC: 122 MG/DL (ref 65–99)
HBA1C MFR BLD: 7.7 %
POTASSIUM SERPL-SCNC: 4.1 MMOL/L (ref 3.5–5.3)
PROT SERPL-MCNC: 7.3 G/DL (ref 6.4–8.4)
PTH-INTACT SERPL-MCNC: 54.2 PG/ML (ref 12–88)
SODIUM SERPL-SCNC: 137 MMOL/L (ref 135–147)

## 2023-08-18 PROCEDURE — 83036 HEMOGLOBIN GLYCOSYLATED A1C: CPT

## 2023-08-18 PROCEDURE — 80053 COMPREHEN METABOLIC PANEL: CPT

## 2023-08-18 PROCEDURE — 83970 ASSAY OF PARATHORMONE: CPT

## 2023-08-18 PROCEDURE — 36415 COLL VENOUS BLD VENIPUNCTURE: CPT

## 2023-08-23 ENCOUNTER — OFFICE VISIT (OUTPATIENT)
Dept: ENDOCRINOLOGY | Facility: CLINIC | Age: 78
End: 2023-08-23
Payer: MEDICARE

## 2023-08-23 VITALS
HEART RATE: 68 BPM | WEIGHT: 160.8 LBS | SYSTOLIC BLOOD PRESSURE: 144 MMHG | BODY MASS INDEX: 30.36 KG/M2 | HEIGHT: 61 IN | DIASTOLIC BLOOD PRESSURE: 78 MMHG

## 2023-08-23 DIAGNOSIS — E04.1 THYROID NODULE: ICD-10-CM

## 2023-08-23 DIAGNOSIS — Z79.4 TYPE 2 DIABETES MELLITUS WITH HYPERGLYCEMIA, WITH LONG-TERM CURRENT USE OF INSULIN (HCC): Primary | ICD-10-CM

## 2023-08-23 DIAGNOSIS — E83.52 HYPERCALCEMIA: ICD-10-CM

## 2023-08-23 DIAGNOSIS — E55.9 VITAMIN D DEFICIENCY: ICD-10-CM

## 2023-08-23 DIAGNOSIS — E78.5 HYPERLIPIDEMIA, UNSPECIFIED HYPERLIPIDEMIA TYPE: ICD-10-CM

## 2023-08-23 DIAGNOSIS — E11.65 TYPE 2 DIABETES MELLITUS WITH HYPERGLYCEMIA, WITH LONG-TERM CURRENT USE OF INSULIN (HCC): Primary | ICD-10-CM

## 2023-08-23 PROCEDURE — 99214 OFFICE O/P EST MOD 30 MIN: CPT | Performed by: PHYSICIAN ASSISTANT

## 2023-08-23 NOTE — ASSESSMENT & PLAN NOTE
This has been stable for 5 years. She denies any obstructive symptoms.   We will continue to monitor with physical exam.

## 2023-08-23 NOTE — PROGRESS NOTES
Established Patient Progress Note      Chief Complaint   Patient presents with   • Diabetes Type 2        Impression & Plan:    Problem List Items Addressed This Visit        Endocrine    Thyroid nodule     This has been stable for 5 years. She denies any obstructive symptoms. We will continue to monitor with physical exam.         Relevant Orders    TSH, 3rd generation    T4, free    Type 2 diabetes mellitus with hyperglycemia, with long-term current use of insulin (HCC) - Primary     Diabetes control is stable given her age and comorbidities. If her bedtime blood sugar is below 150, she will reduce Basaglar to 20 units to reduce risk of hypoglycemia overnight as she does worry about this now that she lives alone. Discussed continuous glucose monitor for personal use but she declines and will let us know if she changes her mind. She does get Trulicity and Basaglar through [employer] patient assistance program and had difficulty with delay in medications last year. She will contact the office in the fall to get application started for next year. Lab Results   Component Value Date    HGBA1C 7.7 (H) 08/18/2023            Relevant Orders    Hemoglobin A1C    Comprehensive metabolic panel    Lipid Panel with Direct LDL reflex    Albumin / creatinine urine ratio    TSH, 3rd generation    T4, free       Other    Hyperlipidemia    Relevant Orders    Comprehensive metabolic panel    Lipid Panel with Direct LDL reflex    TSH, 3rd generation    T4, free    Vitamin D deficiency    Relevant Orders    Vitamin D 25 hydroxy    Hypercalcemia     This is mild and stable. We will continue to monitor. Advised her to keep well-hydrated. Orders Placed This Encounter   Procedures   • Hemoglobin A1C     Standing Status:   Future     Standing Expiration Date:   8/23/2024   • Comprehensive metabolic panel     This is a patient instruction: Patient fasting for 8 hours or longer recommended.      Standing Status:   Future Standing Expiration Date:   8/23/2024   • Lipid Panel with Direct LDL reflex     This is a patient instruction: This test requires patient fasting for 10-12 hours or longer. Drinking of black coffee or black tea is acceptable. Standing Status:   Future     Standing Expiration Date:   8/23/2024   • Albumin / creatinine urine ratio     Standing Status:   Future     Standing Expiration Date:   8/23/2024   • TSH, 3rd generation     This is a patient instruction: This test is non-fasting. Please drink two glasses of water morning of bloodwork. Standing Status:   Future     Standing Expiration Date:   8/23/2024   • T4, free     Standing Status:   Future     Standing Expiration Date:   8/23/2024   • Vitamin D 25 hydroxy     Standing Status:   Future     Standing Expiration Date:   8/23/2024       History of Present Illness:   Gracelyn Lombard is a 69 y.o. female with a history of type 2 diabetes with long term use of insulin since many years ago. Reports complications of microalbuminuria. Denies recent illness or hospitalizations. Denies recent severe hypoglycemic or severe hyperglycemic episodes. Denies any issues with her current regimen. home glucose monitoring: are performed regularly 2x per day   Less active this summer  Diet has been OK, but snacks at night if BG on the lower side.    She did have overnight hypoglycemia (60s) last episode about 1 month ago     Current regimen:   Trulicity 6.34PU weekly  Basaglar 25 units at bedtime  Glimepiride 2mg BID  Metformin 1000mg twice per day     Last Eye Exam: UTD  Last Foot Exam: UTD    Has hypertension: Taking losartan HCTZ and propranolol  Has hyperlipidemia: Taking atorvastatin      For Vitamin D Deficiency, taking 2,000 units daily  Hx osteoporosis, previously treated with fosamax   DEXA 2021- osteopenia  No falls/fx/kidney stones  Needs to keep better hydrated  Get somes calicum in diet    Thyroid nodule- no dysphagia  Stable x 5 years on 2021 ultrasound    Patient Active Problem List   Diagnosis   • Hyperlipidemia   • Hypertension   • Microalbuminuria   • Thyroid nodule   • Tremor   • Type 2 diabetes mellitus with hyperglycemia, with long-term current use of insulin (HCC)   • Vitamin D deficiency   • Bilateral impacted cerumen   • Sensorineural hearing loss (SNHL), bilateral   • Hypercalcemia      Past Medical History:   Diagnosis Date   • Diabetes ([address])    • Diabetes mellitus ([address])    • Disease of thyroid gland    • DVT, bilateral lower limbs (HCC)    • Hyperlipidemia    • Hypertension    • Hypertension    • Kidney stones    • Renal disorder       Past Surgical History:   Procedure Laterality Date   • HYSTERECTOMY      partial      Family History   Problem Relation Age of Onset   • Cancer Family    • No Known Problems Mother    • No Known Problems Father      Social History     Tobacco Use   • Smoking status: Never   • Smokeless tobacco: Never   Substance Use Topics   • Alcohol use: No     Allergies   Allergen Reactions   • Sulfa Antibiotics Rash         Current Outpatient Medications:   •  apixaban (ELIQUIS) 5 mg, Take 5 mg by mouth 2 (two) times a day, Disp: , Rfl:   •  atorvastatin (LIPITOR) 20 mg tablet, Take by mouth daily at bedtime , Disp: , Rfl:   •  Blood Glucose Monitoring Suppl (ONETOUCH VERIO) w/Device KIT, Use to check BG  DX E11.9, patient will check blood sugars 3 times daily, Disp: 1 kit, Rfl: 1  •  Cholecalciferol (Vitamin D3) 50 MCG (2000 UT) capsule, 2000 units twice per day, Disp: 30 capsule, Rfl: 5  •  dulaglutide (Trulicity) 3.16 ZT/4.2UJ injection, Inject 0.5 mL (0.75 mg total) under the skin once a week, Disp: 2 mL, Rfl: 0  •  glimepiride (AMARYL) 2 mg tablet, Take 1 tablet (2 mg total) by mouth 2 (two) times a day, Disp: 180 tablet, Rfl: 1  •  insulin glargine (Basaglar KwikPen) 100 units/mL injection pen, 25 units daily in place of lantus, Disp: 15 mL, Rfl:   •  Insulin Pen Needle (BD Pen Needle Carol Ann U/F) 32G X 4 MM MISC, Use daily, Disp: 100 each, Rfl: 3  •  latanoprost (XALATAN) 0.005 % ophthalmic solution, Administer 1 drop to both eyes daily at bedtime, Disp: , Rfl:   •  losartan-hydrochlorothiazide (HYZAAR) 100-12.5 MG per tablet, Take 1 tablet by mouth daily, Disp: 90 tablet, Rfl: 1  •  metFORMIN (GLUCOPHAGE) 1000 MG tablet, Take 1 tablet (1,000 mg total) by mouth 2 (two) times a day with meals, Disp: 180 tablet, Rfl: 2  •  ONETOUCH DELICA LANCETS 51R MISC, TEST BLOOD SUGARS THREE TIMES DAILY, Disp: , Rfl:   •  OneTouch Verio test strip, Check BG 3x per day DX E11.9, Disp: 300 each, Rfl: 1  •  propranolol (INDERAL) 20 mg tablet, Take 20 mg by mouth Two tablets a day, Disp: , Rfl:     Review of Systems   Constitutional: Negative for activity change, appetite change, chills, diaphoresis, fatigue, fever and unexpected weight change. HENT: Negative for trouble swallowing and voice change. Eyes: Negative for visual disturbance. Respiratory: Negative for shortness of breath. Cardiovascular: Negative for chest pain and palpitations. Gastrointestinal: Negative for abdominal pain, constipation and diarrhea. Endocrine: Negative for cold intolerance, heat intolerance, polydipsia, polyphagia and polyuria. Genitourinary: Negative for frequency and menstrual problem. Musculoskeletal: Negative for arthralgias and myalgias. Skin: Negative for rash. Allergic/Immunologic: Negative for food allergies. Neurological: Positive for tremors. Negative for dizziness. Hematological: Negative for adenopathy. Psychiatric/Behavioral: Negative for sleep disturbance. All other systems reviewed and are negative. Physical Exam:  Body mass index is 30.38 kg/m². /78   Pulse 68   Ht 5' 1" (1.549 m)   Wt 72.9 kg (160 lb 12.8 oz)   BMI 30.38 kg/m²    Wt Readings from Last 3 Encounters:   08/23/23 72.9 kg (160 lb 12.8 oz)   02/23/23 71.7 kg (158 lb)   01/06/22 71.7 kg (158 lb)       Physical Exam  Vitals reviewed. Constitutional:       General: She is not in acute distress. Appearance: She is well-developed. HENT:      Head: Normocephalic and atraumatic. Eyes:      Conjunctiva/sclera: Conjunctivae normal.      Pupils: Pupils are equal, round, and reactive to light. Neck:      Thyroid: No thyromegaly. Cardiovascular:      Rate and Rhythm: Normal rate and regular rhythm. Heart sounds: Normal heart sounds. Pulmonary:      Effort: Pulmonary effort is normal. No respiratory distress. Breath sounds: Normal breath sounds. No wheezing or rales. Abdominal:      General: Bowel sounds are normal. There is no distension. Palpations: Abdomen is soft. Tenderness: There is no abdominal tenderness. Musculoskeletal:         General: Normal range of motion. Cervical back: Normal range of motion and neck supple. Skin:     General: Skin is warm and dry. Neurological:      Mental Status: She is alert and oriented to person, place, and time. Labs:   Lab Results   Component Value Date    HGBA1C 7.7 (H) 08/18/2023    HGBA1C 7.4 (H) 02/17/2023    HGBA1C 7.4 (H) 05/05/2022     Lab Results   Component Value Date    CREATININE 0.87 08/18/2023    CREATININE 0.70 02/17/2023    CREATININE 0.85 05/05/2022    BUN 30 (H) 08/18/2023     09/18/2015    K 4.1 08/18/2023     08/18/2023    CO2 27 08/18/2023     eGFR   Date Value Ref Range Status   08/18/2023 64 ml/min/1.73sq m Final     Lab Results   Component Value Date    CHOL 119 03/26/2015    HDL 42 (L) 02/17/2023    TRIG 203 (H) 02/17/2023     Lab Results   Component Value Date    ALT 14 08/18/2023    AST 12 08/18/2023    ALKPHOS 64 08/18/2023    BILITOT 0.61 09/18/2015     Lab Results   Component Value Date    LCI9LEVTKTKL 1.680 02/17/2023    [id number] 1.120 08/18/2021    [id number] 1.670 01/30/2020     Lab Results   Component Value Date    FREET4 1.01 02/17/2023             Discussed with the patient and all questioned fully answered. She will call me if any problems arise. Follow-up appointment in 4 months.      Counseled patient on diagnostic results, prognosis, risk and benefit of treatment options, instruction for management, importance of treatment compliance, Risk  factor reduction and impressions    Patient Instructions   If bedtime blood sugar is less than 150, reduce basaglar  to 20 units        Mihaela Solis PA-C

## 2023-08-23 NOTE — ASSESSMENT & PLAN NOTE
Diabetes control is stable given her age and comorbidities. If her bedtime blood sugar is below 150, she will reduce Basaglar to 20 units to reduce risk of hypoglycemia overnight as she does worry about this now that she lives alone. Discussed continuous glucose monitor for personal use but she declines and will let us know if she changes her mind. She does get Trulicity and Basaglar through [employer] patient assistance program and had difficulty with delay in medications last year. She will contact the office in the fall to get application started for next year.   Lab Results   Component Value Date    HGBA1C 7.7 (H) 08/18/2023

## 2023-09-19 ENCOUNTER — TELEPHONE (OUTPATIENT)
Dept: ENDOCRINOLOGY | Facility: CLINIC | Age: 78
End: 2023-09-19

## 2023-10-02 DIAGNOSIS — E11.65 TYPE 2 DIABETES MELLITUS WITH HYPERGLYCEMIA, WITH LONG-TERM CURRENT USE OF INSULIN (HCC): ICD-10-CM

## 2023-10-02 DIAGNOSIS — Z79.4 TYPE 2 DIABETES MELLITUS WITH HYPERGLYCEMIA, WITH LONG-TERM CURRENT USE OF INSULIN (HCC): ICD-10-CM

## 2023-10-02 RX ORDER — GLIMEPIRIDE 2 MG/1
2 TABLET ORAL 2 TIMES DAILY
Qty: 180 TABLET | Refills: 3 | Status: SHIPPED | OUTPATIENT
Start: 2023-10-02

## 2023-10-25 ENCOUNTER — TELEPHONE (OUTPATIENT)
Dept: ENDOCRINOLOGY | Facility: CLINIC | Age: 78
End: 2023-10-25

## 2023-10-25 NOTE — TELEPHONE ENCOUNTER
Patient is confused about the patient assistance for trulicity and basaglar. She is stating it was updated that it is on line now.  Thank you

## 2023-10-26 ENCOUNTER — APPOINTMENT (OUTPATIENT)
Dept: LAB | Age: 78
End: 2023-10-26
Payer: MEDICARE

## 2023-10-26 DIAGNOSIS — I10 HYPERTENSION, UNSPECIFIED TYPE: ICD-10-CM

## 2023-10-26 DIAGNOSIS — E11.9 TYPE 2 DIABETES MELLITUS WITHOUT COMPLICATION, UNSPECIFIED WHETHER LONG TERM INSULIN USE (HCC): ICD-10-CM

## 2023-10-26 LAB
ANION GAP SERPL CALCULATED.3IONS-SCNC: 11 MMOL/L
BUN SERPL-MCNC: 20 MG/DL (ref 5–25)
CALCIUM SERPL-MCNC: 10.3 MG/DL (ref 8.4–10.2)
CHLORIDE SERPL-SCNC: 102 MMOL/L (ref 96–108)
CO2 SERPL-SCNC: 27 MMOL/L (ref 21–32)
CREAT SERPL-MCNC: 0.78 MG/DL (ref 0.6–1.3)
GFR SERPL CREATININE-BSD FRML MDRD: 73 ML/MIN/1.73SQ M
GLUCOSE P FAST SERPL-MCNC: 99 MG/DL (ref 65–99)
POTASSIUM SERPL-SCNC: 4.4 MMOL/L (ref 3.5–5.3)
SODIUM SERPL-SCNC: 140 MMOL/L (ref 135–147)

## 2023-10-26 PROCEDURE — 80048 BASIC METABOLIC PNL TOTAL CA: CPT

## 2023-10-26 PROCEDURE — 36415 COLL VENOUS BLD VENIPUNCTURE: CPT

## 2023-12-11 DIAGNOSIS — Z79.4 TYPE 2 DIABETES MELLITUS WITH HYPERGLYCEMIA, WITH LONG-TERM CURRENT USE OF INSULIN (HCC): ICD-10-CM

## 2023-12-11 DIAGNOSIS — E11.65 TYPE 2 DIABETES MELLITUS WITH HYPERGLYCEMIA, WITH LONG-TERM CURRENT USE OF INSULIN (HCC): ICD-10-CM

## 2023-12-11 RX ORDER — INSULIN GLARGINE 100 [IU]/ML
INJECTION, SOLUTION SUBCUTANEOUS
Qty: 30 ML | Refills: 0 | Status: SHIPPED | OUTPATIENT
Start: 2023-12-11

## 2023-12-25 DIAGNOSIS — Z79.4 TYPE 2 DIABETES MELLITUS WITH HYPERGLYCEMIA, WITH LONG-TERM CURRENT USE OF INSULIN (HCC): ICD-10-CM

## 2023-12-25 DIAGNOSIS — E11.65 TYPE 2 DIABETES MELLITUS WITH HYPERGLYCEMIA, WITH LONG-TERM CURRENT USE OF INSULIN (HCC): ICD-10-CM

## 2023-12-27 DIAGNOSIS — Z79.4 TYPE 2 DIABETES MELLITUS WITH HYPERGLYCEMIA, WITH LONG-TERM CURRENT USE OF INSULIN (HCC): ICD-10-CM

## 2023-12-27 DIAGNOSIS — E11.65 TYPE 2 DIABETES MELLITUS WITH HYPERGLYCEMIA, WITH LONG-TERM CURRENT USE OF INSULIN (HCC): ICD-10-CM

## 2023-12-27 RX ORDER — DULAGLUTIDE 0.75 MG/.5ML
INJECTION, SOLUTION SUBCUTANEOUS
Qty: 8 ML | Refills: 0 | Status: SHIPPED | OUTPATIENT
Start: 2023-12-27

## 2024-02-20 ENCOUNTER — TELEPHONE (OUTPATIENT)
Dept: ENDOCRINOLOGY | Facility: CLINIC | Age: 79
End: 2024-02-20

## 2024-02-21 PROBLEM — H61.23 BILATERAL IMPACTED CERUMEN: Status: RESOLVED | Noted: 2020-07-30 | Resolved: 2024-02-21

## 2024-02-22 ENCOUNTER — APPOINTMENT (OUTPATIENT)
Dept: LAB | Age: 79
End: 2024-02-22
Payer: MEDICARE

## 2024-02-22 DIAGNOSIS — E04.1 THYROID NODULE: ICD-10-CM

## 2024-02-22 DIAGNOSIS — E55.9 VITAMIN D DEFICIENCY: ICD-10-CM

## 2024-02-22 DIAGNOSIS — Z79.4 TYPE 2 DIABETES MELLITUS WITH HYPERGLYCEMIA, WITH LONG-TERM CURRENT USE OF INSULIN (HCC): ICD-10-CM

## 2024-02-22 DIAGNOSIS — E78.5 HYPERLIPIDEMIA, UNSPECIFIED HYPERLIPIDEMIA TYPE: ICD-10-CM

## 2024-02-22 DIAGNOSIS — E11.65 TYPE 2 DIABETES MELLITUS WITH HYPERGLYCEMIA, WITH LONG-TERM CURRENT USE OF INSULIN (HCC): ICD-10-CM

## 2024-02-22 LAB
25(OH)D3 SERPL-MCNC: 45.1 NG/ML (ref 30–100)
ALBUMIN SERPL BCP-MCNC: 4.1 G/DL (ref 3.5–5)
ALP SERPL-CCNC: 65 U/L (ref 34–104)
ALT SERPL W P-5'-P-CCNC: 7 U/L (ref 7–52)
ANION GAP SERPL CALCULATED.3IONS-SCNC: 8 MMOL/L
AST SERPL W P-5'-P-CCNC: 14 U/L (ref 13–39)
BILIRUB SERPL-MCNC: 0.65 MG/DL (ref 0.2–1)
BUN SERPL-MCNC: 19 MG/DL (ref 5–25)
CALCIUM SERPL-MCNC: 9.8 MG/DL (ref 8.4–10.2)
CHLORIDE SERPL-SCNC: 99 MMOL/L (ref 96–108)
CHOLEST SERPL-MCNC: 123 MG/DL
CO2 SERPL-SCNC: 28 MMOL/L (ref 21–32)
CREAT SERPL-MCNC: 0.68 MG/DL (ref 0.6–1.3)
CREAT UR-MCNC: 39.7 MG/DL
EST. AVERAGE GLUCOSE BLD GHB EST-MCNC: 163 MG/DL
GFR SERPL CREATININE-BSD FRML MDRD: 84 ML/MIN/1.73SQ M
GLUCOSE P FAST SERPL-MCNC: 102 MG/DL (ref 65–99)
HBA1C MFR BLD: 7.3 %
HDLC SERPL-MCNC: 44 MG/DL
LDLC SERPL CALC-MCNC: 48 MG/DL (ref 0–100)
MICROALBUMIN UR-MCNC: 322.4 MG/L
MICROALBUMIN/CREAT 24H UR: 812 MG/G CREATININE (ref 0–30)
POTASSIUM SERPL-SCNC: 4.3 MMOL/L (ref 3.5–5.3)
PROT SERPL-MCNC: 6.8 G/DL (ref 6.4–8.4)
SODIUM SERPL-SCNC: 135 MMOL/L (ref 135–147)
T4 FREE SERPL-MCNC: 0.8 NG/DL (ref 0.61–1.12)
TRIGL SERPL-MCNC: 154 MG/DL
TSH SERPL DL<=0.05 MIU/L-ACNC: 1.87 UIU/ML (ref 0.45–4.5)

## 2024-02-22 PROCEDURE — 80061 LIPID PANEL: CPT

## 2024-02-22 PROCEDURE — 82570 ASSAY OF URINE CREATININE: CPT

## 2024-02-22 PROCEDURE — 84443 ASSAY THYROID STIM HORMONE: CPT

## 2024-02-22 PROCEDURE — 83036 HEMOGLOBIN GLYCOSYLATED A1C: CPT

## 2024-02-22 PROCEDURE — 80053 COMPREHEN METABOLIC PANEL: CPT

## 2024-02-22 PROCEDURE — 82043 UR ALBUMIN QUANTITATIVE: CPT

## 2024-02-22 PROCEDURE — 82306 VITAMIN D 25 HYDROXY: CPT

## 2024-02-22 PROCEDURE — 36415 COLL VENOUS BLD VENIPUNCTURE: CPT

## 2024-02-22 PROCEDURE — 84439 ASSAY OF FREE THYROXINE: CPT

## 2024-02-28 ENCOUNTER — OFFICE VISIT (OUTPATIENT)
Dept: ENDOCRINOLOGY | Facility: CLINIC | Age: 79
End: 2024-02-28
Payer: MEDICARE

## 2024-02-28 VITALS
OXYGEN SATURATION: 98 % | WEIGHT: 155.4 LBS | DIASTOLIC BLOOD PRESSURE: 98 MMHG | HEIGHT: 61 IN | SYSTOLIC BLOOD PRESSURE: 182 MMHG | HEART RATE: 69 BPM | BODY MASS INDEX: 29.34 KG/M2

## 2024-02-28 DIAGNOSIS — R80.9 MICROALBUMINURIA: ICD-10-CM

## 2024-02-28 DIAGNOSIS — Z79.4 TYPE 2 DIABETES MELLITUS WITH HYPERGLYCEMIA, WITH LONG-TERM CURRENT USE OF INSULIN (HCC): ICD-10-CM

## 2024-02-28 DIAGNOSIS — E04.1 THYROID NODULE: ICD-10-CM

## 2024-02-28 DIAGNOSIS — E11.65 TYPE 2 DIABETES MELLITUS WITH HYPERGLYCEMIA, WITH LONG-TERM CURRENT USE OF INSULIN (HCC): ICD-10-CM

## 2024-02-28 DIAGNOSIS — M81.0 OSTEOPOROSIS, UNSPECIFIED OSTEOPOROSIS TYPE, UNSPECIFIED PATHOLOGICAL FRACTURE PRESENCE: ICD-10-CM

## 2024-02-28 DIAGNOSIS — E83.52 HYPERCALCEMIA: ICD-10-CM

## 2024-02-28 DIAGNOSIS — I10 PRIMARY HYPERTENSION: ICD-10-CM

## 2024-02-28 DIAGNOSIS — E78.5 HYPERLIPIDEMIA, UNSPECIFIED HYPERLIPIDEMIA TYPE: Primary | ICD-10-CM

## 2024-02-28 DIAGNOSIS — Z87.39 HISTORY OF OSTEOPOROSIS: ICD-10-CM

## 2024-02-28 DIAGNOSIS — E55.9 VITAMIN D DEFICIENCY: ICD-10-CM

## 2024-02-28 PROCEDURE — 99214 OFFICE O/P EST MOD 30 MIN: CPT | Performed by: PHYSICIAN ASSISTANT

## 2024-02-28 RX ORDER — LOSARTAN POTASSIUM 100 MG/1
100 TABLET ORAL DAILY
COMMUNITY
Start: 2024-01-16

## 2024-02-28 RX ORDER — GLIMEPIRIDE 2 MG/1
TABLET ORAL
Qty: 180 TABLET | Refills: 3 | Status: SHIPPED | OUTPATIENT
Start: 2024-02-28

## 2024-02-28 NOTE — PROGRESS NOTES
Established Patient Progress Note    Chief Complaint:  Diabetes follow up visit    Impression & Plan:    Problem List Items Addressed This Visit        Endocrine    Thyroid nodule     Stable. No additional imaging needed.          Type 2 diabetes mellitus with hyperglycemia, with long-term current use of insulin (HCC)     Diabetes is under good control for age/comorbidities  Fasting blood sugars are on the low side-- will reduce evening dose of glimepiride to 1mg daily.  OK to reduce basaglar to 15 units  if bedtime blood sugar readings are below 150.   Discussed option of changing glimepiride to SGLT2 inhibitor given worsening microalbuminuria. She declines for now as medication cost has been an issue. Will consider at next visit and can apply for patient assistance if needed.   She is now interested in CGM for personal use  Will order Dexcom G7 and send to [employer].   She declines training as her daughter has used dexcom G7 and will help   Advised her to contact office for training if needed.   Paperwork faxed to [facility] for trulicity.       Lab Results   Component Value Date    HGBA1C 7.3 (H) 02/22/2024          Relevant Medications    glimepiride (AMARYL) 2 mg tablet    Other Relevant Orders    Hemoglobin A1C       Cardiovascular and Mediastinum    Hypertension     BP elevated today- she reports very nervous about driving to our office today  Home BP readings have been good  Dr. Garcia recently discontinued HCTZ due to mild hypercalcemia.          Relevant Medications    losartan (COZAAR) 100 MG tablet       Other    Hyperlipidemia - Primary    Relevant Orders    Comprehensive metabolic panel    Lipid Panel with Direct LDL reflex    Microalbuminuria     This is worsening. Continue losartan and will monitor.   Discussed SGLT2 inhibitors- she declines for now.          Vitamin D deficiency    Hypercalcemia     Normal on recent testing.         Other Visit Diagnoses     History of osteoporosis        Relevant Orders     DXA bone density spine hip and pelvis    Osteoporosis, unspecified osteoporosis type, unspecified pathological fracture presence        Relevant Orders    DXA bone density spine hip and pelvis            History of Present Illness:   Leanne Jiang is a 78 y.o. female with a history of type 2 diabetes  since many years ago. Reports complications of proteinuria.      She has gotten supplies for basaglar, but waiting on shipment of trulicity for [facility] annamarie Ch has contacted our office without response.     Mornign blood sugars have been , mostly below 110.   Bedtime readings 111-200, mostly below 180.        Current regimen:   Trulcity 0.75mg weekly   (Ran out needs paperwork to [facility])  Basaglar 18-20 units daily   Glimepiride 2mg twice per day       Has hypertension: Taking losartan and propranolol  Home BP readings have been good   PCP stopped HCTZ due to high potassium  Nervous about visit today.   Seeing PCP next month  Has hyperlipidemia: Taking atorvastatin    Thyroid disorders: nodules, stable x 5 years on 2021 ultrasound    For Vitamin D Deficiency, taking 2,000 units daily  Hx osteoporosis, previously treated with fosamax   DEXA 2021- osteopenia  No falls/fx/kidney stones  Needs to keep better hydrated  Get somes calicum in diet    Patient Active Problem List   Diagnosis   • Hyperlipidemia   • Hypertension   • Microalbuminuria   • Thyroid nodule   • Tremor   • Type 2 diabetes mellitus with hyperglycemia, with long-term current use of insulin ([facility])   • Vitamin D deficiency   • Sensorineural hearing loss (SNHL), bilateral   • Hypercalcemia      Past Medical History:   Diagnosis Date   • Diabetes ([facility])    • Diabetes mellitus ([facility])    • Disease of thyroid gland    • DVT, bilateral lower limbs ([facility])    • Hyperlipidemia    • Hypertension    • Hypertension    • Kidney stones    • Renal disorder       Past Surgical History:   Procedure Laterality Date   • HYSTERECTOMY      partial      Family History    Problem Relation Age of Onset   • Cancer Family    • No Known Problems Mother    • No Known Problems Father      Social History     Tobacco Use   • Smoking status: Never   • Smokeless tobacco: Never   Substance Use Topics   • Alcohol use: No     Allergies   Allergen Reactions   • Sulfa Antibiotics Rash         Current Outpatient Medications:   •  apixaban (ELIQUIS) 5 mg, Take 5 mg by mouth 2 (two) times a day, Disp: , Rfl:   •  atorvastatin (LIPITOR) 20 mg tablet, Take by mouth daily at bedtime , Disp: , Rfl:   •  Blood Glucose Monitoring Suppl (ONETOUCH VERIO) w/Device KIT, Use to check BG  DX E11.9, patient will check blood sugars 3 times daily, Disp: 1 kit, Rfl: 1  •  Cholecalciferol (Vitamin D3) 50 MCG (2000 UT) capsule, 2000 units twice per day, Disp: 30 capsule, Rfl: 5  •  glimepiride (AMARYL) 2 mg tablet, 1 tab with breakfast, 1/2 tab with dinner, Disp: 180 tablet, Rfl: 3  •  Insulin Glargine Solostar (Basaglar KwikPen) 100 UNIT/ML SOPN, DIAL AND INJECT 25 UNITS UNDER THE SKIN DAILY. MAX DAILY DOSE IS 25 UNITS (Patient taking differently: DIAL AND INJECT 20 UNITS UNDER THE SKIN DAILY. MAX DAILY DOSE IS 25 UNITS), Disp: 30 mL, Rfl: 0  •  Insulin Pen Needle (BD Pen Needle Carol Ann U/F) 32G X 4 MM MISC, Use daily, Disp: 100 each, Rfl: 3  •  latanoprost (XALATAN) 0.005 % ophthalmic solution, Administer 1 drop to both eyes daily at bedtime, Disp: , Rfl:   •  losartan (COZAAR) 100 MG tablet, Take 100 mg by mouth daily, Disp: , Rfl:   •  metFORMIN (GLUCOPHAGE) 1000 MG tablet, TAKE 1 TABLET BY MOUTH TWICE  DAILY WITH MEALS, Disp: 180 tablet, Rfl: 1  •  ONETOUCH DELICA LANCETS 30G MISC, TEST BLOOD SUGARS THREE TIMES DAILY, Disp: , Rfl:   •  OneTouch Verio test strip, Check BG 3x per day DX E11.9, Disp: 300 each, Rfl: 1  •  propranolol (INDERAL) 20 mg tablet, Take 20 mg by mouth Two tablets a day, Disp: , Rfl:   •  Trulicity 0.75 MG/0.5ML injection, INJECT 0.75MG (0.5ML) UNDER THE SKIN ONCE A WEEK., Disp: 8 mL, Rfl:  "0    Review of Systems   Cardiovascular:  Positive for leg swelling.   Neurological:  Positive for tremors.       Physical Exam:  Body mass index is 29.36 kg/m².  BP (!) 182/98   Pulse 69   Ht 5' 1\" (1.549 m)   Wt 70.5 kg (155 lb 6.4 oz)   SpO2 98%   BMI 29.36 kg/m²    Wt Readings from Last 3 Encounters:   02/28/24 70.5 kg (155 lb 6.4 oz)   08/23/23 72.9 kg (160 lb 12.8 oz)   02/23/23 71.7 kg (158 lb)       Physical Exam  Vitals reviewed.   Constitutional:       General: She is not in acute distress.     Appearance: Normal appearance. She is well-developed. She is not toxic-appearing.   HENT:      Head: Normocephalic and atraumatic.   Eyes:      Conjunctiva/sclera: Conjunctivae normal.      Pupils: Pupils are equal, round, and reactive to light.   Neck:      Thyroid: No thyromegaly.   Cardiovascular:      Rate and Rhythm: Normal rate and regular rhythm.      Pulses: no weak pulses.           Dorsalis pedis pulses are 2+ on the right side and 2+ on the left side.        Posterior tibial pulses are 2+ on the right side and 2+ on the left side.      Heart sounds: Normal heart sounds.   Pulmonary:      Effort: Pulmonary effort is normal. No respiratory distress.      Breath sounds: Normal breath sounds. No wheezing or rales.   Abdominal:      General: Bowel sounds are normal. There is no distension.      Palpations: Abdomen is soft.      Tenderness: There is no abdominal tenderness.   Musculoskeletal:         General: Normal range of motion.      Cervical back: Normal range of motion and neck supple.      Right lower leg: Edema (1+) present.      Left lower leg: Edema (1+) present.   Feet:      Right foot:      Skin integrity: No ulcer, skin breakdown, erythema, warmth, callus or dry skin.      Left foot:      Skin integrity: No ulcer, skin breakdown, erythema, warmth, callus or dry skin.   Skin:     General: Skin is warm and dry.   Neurological:      Mental Status: She is alert and oriented to person, place, and " time.   Psychiatric:         Mood and Affect: Mood normal.         Behavior: Behavior normal.       Patient's shoes and socks removed.    Right Foot/Ankle   Right Foot Inspection  Skin Exam: skin normal and skin intact. No dry skin, no warmth, no callus, no erythema, no maceration, no abnormal color, no pre-ulcer, no ulcer and no callus.     Toe Exam: ROM and strength within normal limits. No swelling, no tenderness, erythema and  no right toe deformity    Sensory   Monofilament testing: intact    Vascular  Capillary refills: < 3 seconds  The right DP pulse is 2+. The right PT pulse is 2+.     Left Foot/Ankle  Left Foot Inspection  Skin Exam: skin normal and skin intact. No dry skin, no warmth, no erythema, no maceration, normal color, no pre-ulcer, no ulcer and no callus.     Toe Exam: ROM and strength within normal limits. No swelling, no tenderness, no erythema and no left toe deformity.     Sensory   Monofilament testing: intact    Vascular  Capillary refills: < 3 seconds  The left DP pulse is 2+. The left PT pulse is 2+.     Assign Risk Category  No deformity present  No loss of protective sensation  No weak pulses  Risk: 0      Labs:   Lab Results   Component Value Date    HGBA1C 7.3 (H) 02/22/2024    HGBA1C 7.7 (H) 08/18/2023    HGBA1C 7.4 (H) 02/17/2023     Lab Results   Component Value Date    CREATININE 0.68 02/22/2024    CREATININE 0.78 10/26/2023    CREATININE 0.87 08/18/2023    BUN 19 02/22/2024     09/18/2015    K 4.3 02/22/2024    CL 99 02/22/2024    CO2 28 02/22/2024     GFR, Calculated   Date Value Ref Range Status   01/01/2020 60 (L) >60 mL/min/1.73m2 Final     Comment:     Please refer to initial GFR, CALC footnote     eGFR   Date Value Ref Range Status   02/22/2024 84 ml/min/1.73sq m Final     Lab Results   Component Value Date    CHOL 119 03/26/2015    HDL 44 (L) 02/22/2024    TRIG 154 (H) 02/22/2024     Lab Results   Component Value Date    ALT 7 02/22/2024    AST 14 02/22/2024    ALKPHOS  65 02/22/2024    BILITOT 0.61 09/18/2015     Lab Results   Component Value Date    [id number] 1.873 02/22/2024    [id number] 1.680 02/17/2023    [id number] 1.120 08/18/2021     Lab Results   Component Value Date    FREET4 0.80 02/22/2024       Discussed with the patient and all questioned fully answered. She will call me if any problems arise.    Follow-up appointment in 6 months.     Counseled patient on diagnostic results, prognosis, risk and benefit of treatment options, instruction for management, importance of treatment compliance, Risk  factor reduction and impressions    Desiree Perez PA-C

## 2024-02-28 NOTE — ASSESSMENT & PLAN NOTE
BP elevated today- she reports very nervous about driving to our office today  Home BP readings have been good  Dr. Garcia recently discontinued HCTZ due to mild hypercalcemia.

## 2024-02-28 NOTE — ASSESSMENT & PLAN NOTE
Diabetes is under good control for age/comorbidities  Fasting blood sugars are on the low side-- will reduce evening dose of glimepiride to 1mg daily.  OK to reduce basaglar to 15 units  if bedtime blood sugar readings are below 150.   Discussed option of changing glimepiride to SGLT2 inhibitor given worsening microalbuminuria. She declines for now as medication cost has been an issue. Will consider at next visit and can apply for patient assistance if needed.   She is now interested in CGM for personal use  Will order Dexcom G7 and send to [employer].   She declines training as her daughter has used dexcom G7 and will help   Advised her to contact office for training if needed.   Paperwork faxed to [facility] for trulicity.       Lab Results   Component Value Date    HGBA1C 7.3 (H) 02/22/2024

## 2024-02-28 NOTE — ASSESSMENT & PLAN NOTE
This is worsening. Continue losartan and will monitor.   Discussed SGLT2 inhibitors- she declines for now.

## 2024-02-28 NOTE — PATIENT INSTRUCTIONS
If you would like dexcom G7 training- let us know  If you start using dexcom G7-- let us know so we can set up to share data.

## 2024-02-29 LAB
DME PARACHUTE DELIVERY DATE REQUESTED: NORMAL
DME PARACHUTE ITEM DESCRIPTION: NORMAL
DME PARACHUTE ITEM DESCRIPTION: NORMAL
DME PARACHUTE ORDER STATUS: NORMAL
DME PARACHUTE SUPPLIER NAME: NORMAL
DME PARACHUTE SUPPLIER PHONE: NORMAL

## 2024-03-05 LAB
DME PARACHUTE DELIVERY DATE ACTUAL: NORMAL
DME PARACHUTE DELIVERY DATE EXPECTED: NORMAL
DME PARACHUTE DELIVERY DATE REQUESTED: NORMAL
DME PARACHUTE ITEM DESCRIPTION: NORMAL
DME PARACHUTE ITEM DESCRIPTION: NORMAL
DME PARACHUTE ORDER STATUS: NORMAL
DME PARACHUTE SUPPLIER NAME: NORMAL
DME PARACHUTE SUPPLIER PHONE: NORMAL

## 2024-03-11 DIAGNOSIS — Z79.4 TYPE 2 DIABETES MELLITUS WITH HYPERGLYCEMIA, WITH LONG-TERM CURRENT USE OF INSULIN (HCC): ICD-10-CM

## 2024-03-11 DIAGNOSIS — E11.65 TYPE 2 DIABETES MELLITUS WITH HYPERGLYCEMIA, WITH LONG-TERM CURRENT USE OF INSULIN (HCC): ICD-10-CM

## 2024-03-11 RX ORDER — BLOOD SUGAR DIAGNOSTIC
STRIP MISCELLANEOUS
Qty: 300 STRIP | Refills: 1 | Status: SHIPPED | OUTPATIENT
Start: 2024-03-11

## 2024-03-12 ENCOUNTER — TELEPHONE (OUTPATIENT)
Dept: ENDOCRINOLOGY | Facility: CLINIC | Age: 79
End: 2024-03-12

## 2024-03-14 ENCOUNTER — TELEPHONE (OUTPATIENT)
Age: 79
End: 2024-03-14

## 2024-03-14 NOTE — TELEPHONE ENCOUNTER
Patient would like a call back asap. Granddaughter is setting up Dexcom 7 and needs code. Also, asking what apps she needs to download to phone. Thank you

## 2024-03-14 NOTE — TELEPHONE ENCOUNTER
Lvm for pt to contact office for requested information.    Apps:     Dexcom and [employer]     Code: [email or web address]    Also, sent via [employer].

## 2024-03-27 DIAGNOSIS — E11.65 TYPE 2 DIABETES MELLITUS WITH HYPERGLYCEMIA, WITH LONG-TERM CURRENT USE OF INSULIN (HCC): ICD-10-CM

## 2024-03-27 DIAGNOSIS — Z79.4 TYPE 2 DIABETES MELLITUS WITH HYPERGLYCEMIA, WITH LONG-TERM CURRENT USE OF INSULIN (HCC): ICD-10-CM

## 2024-03-27 RX ORDER — INSULIN GLARGINE 100 [IU]/ML
INJECTION, SOLUTION SUBCUTANEOUS
Qty: 30 ML | Refills: 1 | Status: SHIPPED | OUTPATIENT
Start: 2024-03-27

## 2024-04-24 ENCOUNTER — APPOINTMENT (OUTPATIENT)
Dept: RADIOLOGY | Facility: OTHER | Age: 79
End: 2024-04-24
Payer: MEDICARE

## 2024-04-24 ENCOUNTER — TELEPHONE (OUTPATIENT)
Age: 79
End: 2024-04-24

## 2024-04-24 ENCOUNTER — OFFICE VISIT (OUTPATIENT)
Dept: OBGYN CLINIC | Facility: OTHER | Age: 79
End: 2024-04-24
Payer: MEDICARE

## 2024-04-24 VITALS
DIASTOLIC BLOOD PRESSURE: 81 MMHG | WEIGHT: 150.6 LBS | BODY MASS INDEX: 28.43 KG/M2 | HEART RATE: 73 BPM | HEIGHT: 61 IN | SYSTOLIC BLOOD PRESSURE: 204 MMHG

## 2024-04-24 DIAGNOSIS — M25.511 RIGHT SHOULDER PAIN, UNSPECIFIED CHRONICITY: ICD-10-CM

## 2024-04-24 DIAGNOSIS — M77.11 LATERAL EPICONDYLITIS OF RIGHT ELBOW: Primary | ICD-10-CM

## 2024-04-24 DIAGNOSIS — G56.31 RADIAL TUNNEL SYNDROME OF RIGHT UPPER EXTREMITY: ICD-10-CM

## 2024-04-24 PROCEDURE — 99204 OFFICE O/P NEW MOD 45 MIN: CPT | Performed by: ORTHOPAEDIC SURGERY

## 2024-04-24 PROCEDURE — 73030 X-RAY EXAM OF SHOULDER: CPT

## 2024-04-24 NOTE — PROGRESS NOTES
"Orthopaedic Surgery - Office Note  Leanne Jiang (78 y.o. female)   : 1945   MRN: [id number]  Encounter Date: 2024    Assessment / Plan    Right lateral epicondylitis   Right radial tunnel syndrome     Begin physical therapy for stretching and strengthening   ICE in the evening  Voltaren Gel PRN   Avoid NSAIDs due to Eliquis usage   Cock up wrist brace provided   X rays reviewed with patient and family   Follow-up:  6-8 weeks PRN       Chief Complaint / Date of Onset  Right elbow pain 2024  Injury Mechanism / Date  None  Surgery / Date  None    History of Present Illness   Leanne Jiang is a 78 y.o. female who presents for right elbow pain after mowing the lawn over this last weekend (2024). She reports the pain is located at the elbow posteriorly and laterally. The pain radiates down the forearm and into the digits occasionally. She reports associated numbness and tingling. Tylenol does not help the pain. She takes Eliquis daily for DVT     Treatment Summary  Medications / Modalities  Tylenol with no relief   Bracing / Immobilization  None  Physical Therapy  None  Injections  None  Prior Surgeries  None  Other Treatments  None    Employment / Current Status  Retired     Sport / Organization / Current Status  None       Review of Systems  Pertinent items are noted in HPI.  All other systems were reviewed and are negative.      Physical Exam  BP (!) 204/81   Pulse 73   Ht 5' 1\" (1.549 m)   Wt 68.3 kg (150 lb 9.6 oz)   BMI 28.46 kg/m²   Cons: Appears well.  No apparent distress.  Psych: Alert. Oriented x3.  Mood and affect normal.  Eyes: PERRLA, EOMI  Resp: Normal effort.  No audible wheezing or stridor.  CV: Palpable pulse.  No discernable arrhythmia.  No LE edema.  Lymph:  No palpable cervical, axillary, or inguinal lymphadenopathy.  Skin: Warm.  No palpable masses.  No visible lesions.  Neuro: Normal muscle tone.  Normal and symmetric DTR's.     Right Elbow Exam  Alignment:  Normal elbow " alignment and carrying angle.  Inspection:  No swelling.  Palpation:   Positive tenderness at lateral forearm and lateral epicondyle.  ROM:  Normal shoulder ROM. Normal elbow ROM.  Strength:  5/5 supraspinatus, infraspinatus, and subscapularis.  Stability:  No objective elbow instability.  Stable varus and valgus stress.  Tests:  (+) Resisted long finger extension (produces pain at lateral forearm). (+) Tinel of PIN nerve at radial tunnel).  Neurovascular:  Sensation intact in Ax/R/M/U nerve distributions. 2+ radial pulse.        Studies Reviewed  XR of right shoulder - demonstrate no fracture or dislocation.       Procedures  No procedures today.    Medical, Surgical, Family, and Social History  The patient's medical history, family history, and social history, were reviewed and updated as appropriate.    Past Medical History:   Diagnosis Date    Diabetes (HCC)     Diabetes mellitus (HCC)     Disease of thyroid gland     DVT, bilateral lower limbs (HCC)     Hyperlipidemia     Hypertension     Hypertension     Kidney stones     Renal disorder        Past Surgical History:   Procedure Laterality Date    HYSTERECTOMY      partial       Family History   Problem Relation Age of Onset    Cancer Family     No Known Problems Mother     No Known Problems Father        Social History     Occupational History    Not on file   Tobacco Use    Smoking status: Never    Smokeless tobacco: Never   Vaping Use    Vaping status: Never Used   Substance and Sexual Activity    Alcohol use: No    Drug use: No    Sexual activity: Not Currently       Allergies   Allergen Reactions    Sulfa Antibiotics Rash         Current Outpatient Medications:     apixaban (ELIQUIS) 5 mg, Take 5 mg by mouth 2 (two) times a day, Disp: , Rfl:     atorvastatin (LIPITOR) 20 mg tablet, Take by mouth daily at bedtime , Disp: , Rfl:     Blood Glucose Monitoring Suppl (ONETOUCH VERIO) w/Device KIT, Use to check BG  DX E11.9, patient will check blood sugars 3  times daily, Disp: 1 kit, Rfl: 1    Cholecalciferol (Vitamin D3) 50 MCG (2000 UT) capsule, 2000 units twice per day, Disp: 30 capsule, Rfl: 5    glimepiride (AMARYL) 2 mg tablet, 1 tab with breakfast, 1/2 tab with dinner, Disp: 180 tablet, Rfl: 3    Insulin Glargine Solostar (Basaglar KwikPen) 100 UNIT/ML SOPN, DIAL AND INJECT 25 UNITS UNDER THE SKIN DAILY. MAX DAILY DOSE IS 25 UNITS., Disp: 30 mL, Rfl: 1    Insulin Pen Needle (BD Pen Needle Carol Ann U/F) 32G X 4 MM MISC, Use daily, Disp: 100 each, Rfl: 3    latanoprost (XALATAN) 0.005 % ophthalmic solution, Administer 1 drop to both eyes daily at bedtime, Disp: , Rfl:     losartan (COZAAR) 100 MG tablet, Take 100 mg by mouth daily, Disp: , Rfl:     metFORMIN (GLUCOPHAGE) 1000 MG tablet, TAKE 1 TABLET BY MOUTH TWICE  DAILY WITH MEALS, Disp: 180 tablet, Rfl: 1    ONETOUCH DELICA LANCETS 30G MISC, TEST BLOOD SUGARS THREE TIMES DAILY, Disp: , Rfl:     OneTouch Verio test strip, CHECK BLOOD GLUCOSE THREE TIMES DAILY, Disp: 300 strip, Rfl: 1    propranolol (INDERAL) 20 mg tablet, Take 20 mg by mouth Two tablets a day, Disp: , Rfl:     Trulicity 0.75 MG/0.5ML injection, INJECT 0.75MG (0.5ML) UNDER THE SKIN ONCE A WEEK., Disp: 8 mL, Rfl: 0       Mitchel Amaral MD    Scribe Attestation      I,:   am acting as a scribe while in the presence of the attending physician.:       I,:   personally performed the services described in this documentation    as scribed in my presence.:

## 2024-04-24 NOTE — TELEPHONE ENCOUNTER
Caller: [facility]     Doctor: Salome     Reason for call: They would like to know if you can change patient' script from PT to OT so they can get her in quicker     Call back#: [phone]

## 2024-05-02 ENCOUNTER — EVALUATION (OUTPATIENT)
Dept: OCCUPATIONAL THERAPY | Age: 79
End: 2024-05-02
Payer: MEDICARE

## 2024-05-02 DIAGNOSIS — M77.11 LATERAL EPICONDYLITIS OF RIGHT ELBOW: Primary | ICD-10-CM

## 2024-05-02 PROCEDURE — 97165 OT EVAL LOW COMPLEX 30 MIN: CPT

## 2024-05-02 PROCEDURE — 97110 THERAPEUTIC EXERCISES: CPT

## 2024-05-02 PROCEDURE — 97140 MANUAL THERAPY 1/> REGIONS: CPT

## 2024-05-02 NOTE — PROGRESS NOTES
OT Evaluation     Today's date: 2024  Patient name: Leanne Jiang  : 1945  MRN: [id number]  Referring provider: Oj Mcmahon, *  Dx:   Encounter Diagnosis     ICD-10-CM    1. Lateral epicondylitis of right elbow  M77.11 Ambulatory Referral to Physical Therapy                     Assessment  Assessment details: Altagracia is a 79 y/o RHD F who presents with shoulder and elbow pain on the dominant side. She has tenderness at the lateral epicondyle as well as pain with resistive wrist extension and elbow extension. Her ROM at the wrist and elbow is WNL and slightly limited shoulder ROM. She lives alone and has pain completing grocery shopping, driving, and sleeping. Pt would benefit from skilled occupational therapy services to address pain, posture, and strengthening.   Impairments: impaired physical strength, lacks appropriate home exercise program and pain with function    Goals  STG: In 4-6 weeks, pain with movement will not exceed a 5/10 per patient report.   STG: In 4-6 weeks, strength will increase by 10-15% to aid in carrying groceries.   STG: In 2 weeks, patient will be compliant with HEP in 6/7 days of the week.     LTG: To get good sleep without pain.   LTG: TO complete around the house activities without pain.     Plan  Plan details: Pt will return for skilled services 2x/week address pain and strengthening. She was provided with an HEP including wrist eccentric stretches, shoulder AROM, and gentle eccentric wrist strengthening as tolerated. She was educated on the use of heat and ice in coordination with her HEP. She had no questions following the evaluation.   Patient would benefit from: custom splinting, OT eval and skilled occupational therapy  Planned modality interventions: fluidotherapy, high voltage pulsed current: pain management, TENS, thermotherapy: paraffin bath and cryotherapy  Planned therapy interventions: IASTM, joint mobilization, kinesiology taping, manual therapy, orthotic  fitting/training, nerve gliding, neuromuscular re-education, orthotic management and training, patient education, postural training, stretching, strengthening, therapeutic activities, therapeutic exercise, home exercise program, graded activity, graded exercise, functional ROM exercises, coordination, compression, behavior modification, ADL retraining and activity modification  Frequency: 2x week  Duration in visits: 10  Duration in weeks: 5  Treatment plan discussed with: patient        Subjective Evaluation    History of Present Illness  Mechanism of injury: Pain started in fall   Wearing pre fabricated brace at night - hasn't noticed much benefit   R elbow pain; R shoulder pain/limited ROM     Patient Goals  Patient goals for therapy: decreased pain  Patient goal: Sleep without pain  Pain  Current pain ratin  At best pain ratin  At worst pain ratin  Quality: radiating  Relieving factors: heat (massage)  Exacerbated by: any movement, grocery shopping, driving.    Social Support  Lives with: alone    Hand dominance: right          Objective     Tenderness     Right Elbow   Tenderness in the lateral epicondyle.     Right Wrist/Hand   Tenderness in the lateral epicondyle.     Additional Tenderness Details  Pain throughout extensor compartment    Active Range of Motion     Right Shoulder   Flexion: 115 degrees   Extension: 110 degrees     Right Elbow   Normal active range of motion    Right Wrist   Wrist flexion: 60 degrees   Wrist extension: 65 degrees     Strength/Myotome Testing     Right Elbow   Flexion: 5 (slight discomfort felt in the shoulder)  Extension: 5    Left Wrist/Hand      (2nd hand position)     Trial 1: 29.3    Right Wrist/Hand   Wrist extension: 4 (pain with release)  Wrist flexion: 4  Radial deviation: 4  Ulnar deviation: 4     (2nd hand position)     Trial 1: 19.5    Additional Strength Details  Radiating pain with elbow extended  testing              Precautions:  Universal      Manuals 5/2 eval            STM 12            KT APPLIED                                      Neuro Re-Ed                                                                                                        Ther Ex             HEP 12                                                                                                       Ther Activity                                       Gait Training                                       Modalities             MHP applied

## 2024-05-07 ENCOUNTER — OFFICE VISIT (OUTPATIENT)
Dept: OCCUPATIONAL THERAPY | Age: 79
End: 2024-05-07
Payer: MEDICARE

## 2024-05-07 DIAGNOSIS — M77.11 LATERAL EPICONDYLITIS OF RIGHT ELBOW: Primary | ICD-10-CM

## 2024-05-07 PROCEDURE — 97110 THERAPEUTIC EXERCISES: CPT

## 2024-05-07 PROCEDURE — 97140 MANUAL THERAPY 1/> REGIONS: CPT

## 2024-05-07 NOTE — PROGRESS NOTES
Daily Note     Today's date: 2024  Patient name: Leanne Jiang  : 1945  MRN: [id number]  Referring provider: Oj Mcmahon, *  Dx:   Encounter Diagnosis     ICD-10-CM    1. Lateral epicondylitis of right elbow  M77.11                      Subjective: Sometimes it feels better, sometimes it feels about the same. The heat feels really good on it.       Objective: See treatment diary below      Assessment: Tolerated treatment fair. Trigger point noted along the extensor compartment. Soreness noted after eccentric strengthening. Patient demonstrated fatigue post treatment and would benefit from continued OT      Plan: Continue per plan of care.      Precautions: Universal      Manuals  eval            STM 12 12           KT APPLIED applied                                     Neuro Re-Ed                                                                                                        Ther Ex             HEP 12            Ecc wrist stretches  3x30s           Shoulder AROM  Scap squeezes 20-30x           Radial Nerve Glides  15-20x           Wrist ecc  2x15 1#           S/P  No weight 20x           EDC  Sm rb 20-30x           Grasp  YPW 3x30 center           Ther Activity                                       Gait Training                                       Modalities             MHP applied applied

## 2024-05-09 ENCOUNTER — OFFICE VISIT (OUTPATIENT)
Dept: OCCUPATIONAL THERAPY | Age: 79
End: 2024-05-09
Payer: MEDICARE

## 2024-05-09 DIAGNOSIS — M77.11 LATERAL EPICONDYLITIS OF RIGHT ELBOW: Primary | ICD-10-CM

## 2024-05-09 DIAGNOSIS — E11.65 TYPE 2 DIABETES MELLITUS WITH HYPERGLYCEMIA, WITH LONG-TERM CURRENT USE OF INSULIN (HCC): ICD-10-CM

## 2024-05-09 DIAGNOSIS — Z79.4 TYPE 2 DIABETES MELLITUS WITH HYPERGLYCEMIA, WITH LONG-TERM CURRENT USE OF INSULIN (HCC): ICD-10-CM

## 2024-05-09 PROCEDURE — 97110 THERAPEUTIC EXERCISES: CPT

## 2024-05-09 PROCEDURE — 97140 MANUAL THERAPY 1/> REGIONS: CPT

## 2024-05-09 NOTE — PROGRESS NOTES
Daily Note     Today's date: 2024  Patient name: Leanne Jiang  : 1945  MRN: [id number]  Referring provider: Oj Mcmahon, *  Dx:   Encounter Diagnosis     ICD-10-CM    1. Lateral epicondylitis of right elbow  M77.11                        Subjective: The tingling has gotten better, it was pretty sore after last session.       Objective: See treatment diary below      Assessment: Tolerated treatment fair. Downgraded repetitions to decrease soreness and pain following session. Worked on stretching the neck for cervical tightness. Increased fatigue with increased holds on scapular squeezes. Patient demonstrated fatigue post treatment and would benefit from continued OT      Plan: Continue per plan of care.      Precautions: Universal      Manuals  eval           STM 12 12 10          KT APPLIED applied applied                                    Neuro Re-Ed                                                                                                        Ther Ex             HEP 12            Ecc wrist stretches  3x30s 3x30s          Shoulder AROM  Scap squeezes 20-30x 30x 5 s holds          Radial Nerve Glides  15-20x           Wrist ecc  2x15 1# 2x10 1#          S/P  No weight 20x No weight 30x          EDC  Sm rb 20-30x Sm rb 30x           Grasp  YPW 3x30 center YPW center; side 30x          Neck Stretch   3x30s                                    Gait Training                                       Modalities             MHP applied applied applied

## 2024-05-14 ENCOUNTER — OFFICE VISIT (OUTPATIENT)
Dept: OCCUPATIONAL THERAPY | Age: 79
End: 2024-05-14
Payer: MEDICARE

## 2024-05-14 DIAGNOSIS — M77.11 LATERAL EPICONDYLITIS OF RIGHT ELBOW: Primary | ICD-10-CM

## 2024-05-14 PROCEDURE — 97140 MANUAL THERAPY 1/> REGIONS: CPT

## 2024-05-14 PROCEDURE — 97110 THERAPEUTIC EXERCISES: CPT

## 2024-05-14 NOTE — PROGRESS NOTES
Daily Note     Today's date: 2024  Patient name: Leanne Jiang  : 1945  MRN: [id number]  Referring provider: Oj Mcmahon, *  Dx:   Encounter Diagnosis     ICD-10-CM    1. Lateral epicondylitis of right elbow  M77.11                    Subjective: Its been feeling a little better.      Objective: See treatment diary below      Assessment: Tolerated treatment fair. IASTM along the triceps at scapular aided in muscle release and to decrease tingling. Tolerated upgrades in strengthening well. Min tactile cues for positioning with PREs. Patient demonstrated fatigue post treatment and would benefit from continued OT      Plan: Continue per plan of care.      Precautions: Universal      Manuals  eval          IASTM 12 12 10 12         KT APPLIED applied applied                                    Neuro Re-Ed                                                                                                        Ther Ex             HEP 12            Ecc wrist stretches  3x30s 3x30s 3x30s         Shoulder AROM  Scap squeezes 20-30x 30x 5 s holds 20 circles forward and 20 circles back         Radial Nerve Glides  15-20x           Wrist ecc  2x15 1# 2x10 1# 2x15 1#         S/P  No weight 20x No weight 30x Sm hammer 30x         EDC  Sm rb 20-30x Sm rb 30x           Grasp  YPW 3x30 center YPW center; side 30x          Neck Stretch   3x30s 3x30s both ways         Scap strengthening    YTB 25x scap add    25x IR    15x ER                      Gait Training                                       Modalities             MHP applied applied applied applied

## 2024-05-16 ENCOUNTER — OFFICE VISIT (OUTPATIENT)
Dept: OCCUPATIONAL THERAPY | Age: 79
End: 2024-05-16
Payer: MEDICARE

## 2024-05-16 DIAGNOSIS — M77.11 LATERAL EPICONDYLITIS OF RIGHT ELBOW: Primary | ICD-10-CM

## 2024-05-16 PROCEDURE — 97110 THERAPEUTIC EXERCISES: CPT

## 2024-05-16 PROCEDURE — 97140 MANUAL THERAPY 1/> REGIONS: CPT

## 2024-05-16 NOTE — PROGRESS NOTES
Daily Note     Today's date: 2024  Patient name: Leanne Jiang  : 1945  MRN: [id number]  Referring provider: Oj Mcmahon, *  Dx:   Encounter Diagnosis     ICD-10-CM    1. Lateral epicondylitis of right elbow  M77.11                      Subjective: It feels so much better.       Objective: See treatment diary below      Assessment: Tolerated treatment fair. Tenderness along the scapula with IASTM. Tactile cues for elbow positioning with scapular strengthening. KT applied along the extensor compartment of the forearm and over the lateral epicondyle. Patient demonstrated fatigue post treatment and would benefit from continued OT      Plan: Continue per plan of care.      Precautions: Universal      Manuals  eval         IASTM 12 12 10 12 12        KT APPLIED applied applied  applied                                  Neuro Re-Ed                                                                                                        Ther Ex             HEP 12            Ecc wrist stretches  3x30s 3x30s 3x30s 3x30s        Shoulder AROM  Scap squeezes 20-30x 30x 5 s holds 20 circles forward and 20 circles back 20x forward    20x back        Radial Nerve Glides  15-20x           Wrist ecc  2x15 1# 2x10 1# 2x15 1# 2x15 1#        S/P  No weight 20x No weight 30x Sm hammer 30x Sm hammer 30x        EDC  Sm rb 20-30x Sm rb 30x   Sm rb 25x        Grasp  YPW 3x30 center YPW center; side 30x          Neck Stretch   3x30s 3x30s both ways 3x30s both ways        Scap strengthening    YTB 25x scap add    25x IR    15x ER YTB 30x scap add    25 IR    20 ER                     Gait Training                                       Modalities             MHP applied applied applied applied applied

## 2024-05-21 ENCOUNTER — OFFICE VISIT (OUTPATIENT)
Dept: OCCUPATIONAL THERAPY | Age: 79
End: 2024-05-21
Payer: MEDICARE

## 2024-05-21 DIAGNOSIS — M77.11 LATERAL EPICONDYLITIS OF RIGHT ELBOW: Primary | ICD-10-CM

## 2024-05-21 PROCEDURE — 97140 MANUAL THERAPY 1/> REGIONS: CPT

## 2024-05-21 PROCEDURE — 97110 THERAPEUTIC EXERCISES: CPT

## 2024-05-21 NOTE — PROGRESS NOTES
Daily Note     Today's date: 2024  Patient name: Leanne Jiang  : 1945  MRN: [id number]  Referring provider: Oj Mcmahon, *  Dx:   Encounter Diagnosis     ICD-10-CM    1. Lateral epicondylitis of right elbow  M77.11                        Subjective: It's feeling okay today.       Objective: See treatment diary below      Assessment: Tolerated treatment fair. Tenderness along the scapula with IASTM. Tactile cues for elbow positioning with scapular strengthening. Tolerated upgrades well. Patient demonstrated fatigue post treatment and would benefit from continued OT      Plan: Continue per plan of care.      Precautions: Universal      Manuals  eval        IASTM 12 12 10 12 12 12       KT APPLIED applied applied  applied                                  Neuro Re-Ed                                                                                                        Ther Ex             HEP 12            Ecc wrist stretches  3x30s 3x30s 3x30s 3x30s 3x30s       Shoulder AROM  Scap squeezes 20-30x 30x 5 s holds 20 circles forward and 20 circles back 20x forward    20x back 30x forward    30x backward       Radial Nerve Glides  15-20x           Wrist ecc  2x15 1# 2x10 1# 2x15 1# 2x15 1# 2x15 2#       S/P  No weight 20x No weight 30x Sm hammer 30x Sm hammer 30x Sm hammer 30x        EDC  Sm rb 20-30x Sm rb 30x   Sm rb 25x Sm rb 30x       Grasp  YPW 3x30 center YPW center; side 30x   RPW center 30x       Neck Stretch   3x30s 3x30s both ways 3x30s both ways 3x30s       Scap strengthening    YTB 25x scap add    25x IR    15x ER YTB 30x scap add    25 IR    20 ER YTB scap add, IR, ER 30x                    Gait Training                                       Modalities             MHP applied applied applied applied applied applied

## 2024-05-23 ENCOUNTER — OFFICE VISIT (OUTPATIENT)
Dept: OCCUPATIONAL THERAPY | Age: 79
End: 2024-05-23
Payer: MEDICARE

## 2024-05-23 DIAGNOSIS — M77.11 LATERAL EPICONDYLITIS OF RIGHT ELBOW: Primary | ICD-10-CM

## 2024-05-23 PROCEDURE — 97110 THERAPEUTIC EXERCISES: CPT

## 2024-05-23 PROCEDURE — 97140 MANUAL THERAPY 1/> REGIONS: CPT

## 2024-05-23 NOTE — PROGRESS NOTES
Daily Note     Today's date: 2024  Patient name: Leanne Jiang  : 1945  MRN: [id number]  Referring provider: Oj Mcmahon, *  Dx:   Encounter Diagnosis     ICD-10-CM    1. Lateral epicondylitis of right elbow  M77.11                          Subjective: I'm not sure if I did too much yesterday.       Objective: See treatment diary below      Assessment: Tolerated treatment fair. Increased pulling with resisted wrist flx and supination. Did well with upgrades. Cont soreness in the scapula with IASTM. Patient demonstrated fatigue post treatment and would benefit from continued OT      Plan: Continue per plan of care.      Precautions: Universal      Manuals  eval       IASTM 12 12 10 12 12 12 12      KT APPLIED applied applied  applied                                  Neuro Re-Ed                                                                                                        Ther Ex             HEP 12            Ecc wrist stretches  3x30s 3x30s 3x30s 3x30s 3x30s 3x30s      Shoulder AROM  Scap squeezes 20-30x 30x 5 s holds 20 circles forward and 20 circles back 20x forward    20x back 30x forward    30x backward       Radial Nerve Glides  15-20x           Wrist ecc  2x15 1# 2x10 1# 2x15 1# 2x15 1# 2x15 2#       S/P  No weight 20x No weight 30x Sm hammer 30x Sm hammer 30x Sm hammer 30x        EDC  Sm rb 20-30x Sm rb 30x   Sm rb 25x Sm rb 30x Lg rb 30x      Grasp  YPW 3x30 center YPW center; side 30x   RPW center 30x RPW center;side 30x      Neck Stretch   3x30s 3x30s both ways 3x30s both ways 3x30s 3x30s      Scap strengthening    YTB 25x scap add    25x IR    15x ER YTB 30x scap add    25 IR    20 ER YTB scap add, IR, ER 30x YTB add, IR, ER 30x      Wrist iso        RPB/YPB 3x10 all directions      Gait Training                                       Modalities             MHP applied applied applied applied applied applied applied

## 2024-05-28 ENCOUNTER — OFFICE VISIT (OUTPATIENT)
Dept: OCCUPATIONAL THERAPY | Age: 79
End: 2024-05-28
Payer: MEDICARE

## 2024-05-28 DIAGNOSIS — M77.11 LATERAL EPICONDYLITIS OF RIGHT ELBOW: Primary | ICD-10-CM

## 2024-05-28 PROCEDURE — 97140 MANUAL THERAPY 1/> REGIONS: CPT

## 2024-05-28 PROCEDURE — 97110 THERAPEUTIC EXERCISES: CPT

## 2024-05-28 NOTE — PROGRESS NOTES
Daily Note     Today's date: 2024  Patient name: Leanne Jiang  : 1945  MRN: [id number]  Referring provider: Oj Mcmahon, *  Dx:   Encounter Diagnosis     ICD-10-CM    1. Lateral epicondylitis of right elbow  M77.11                          Subjective: It's feeling pretty good.       Objective: See treatment diary below      Assessment: Tolerated treatment fair. No reports of pain with PREs. Will upgrade scapular strengthening next session. Patient demonstrated fatigue post treatment and would benefit from continued OT      Plan: Continue per plan of care.      Precautions: Universal      Manuals  eval      IASTM 12 12 10 12 12 12 12 12'     KT APPLIED applied applied  applied                                  Neuro Re-Ed                                                                                                        Ther Ex             HEP 12            Ecc wrist stretches  3x30s 3x30s 3x30s 3x30s 3x30s 3x30s 3x30s     Shoulder AROM  Scap squeezes 20-30x 30x 5 s holds 20 circles forward and 20 circles back 20x forward    20x back 30x forward    30x backward       Radial Nerve Glides  15-20x           Wrist ecc  2x15 1# 2x10 1# 2x15 1# 2x15 1# 2x15 2#  2x15 2#     S/P  No weight 20x No weight 30x Sm hammer 30x Sm hammer 30x Sm hammer 30x   Sm hammer 30x     EDC  Sm rb 20-30x Sm rb 30x   Sm rb 25x Sm rb 30x Lg rb 30x Lg rb 30x      Grasp  YPW 3x30 center YPW center; side 30x   RPW center 30x RPW center;side 30x      Neck Stretch   3x30s 3x30s both ways 3x30s both ways 3x30s 3x30s 3x30s     Scap strengthening    YTB 25x scap add    25x IR    15x ER YTB 30x scap add    25 IR    20 ER YTB scap add, IR, ER 30x YTB add, IR, ER 30x YTB 30x IR, ER , scap add     Wrist iso        RPB/YPB 3x10 all directions RPB 20x all directions     Gait Training                                       Modalities             MHP applied applied applied applied applied applied  applied applied

## 2024-05-30 ENCOUNTER — OFFICE VISIT (OUTPATIENT)
Dept: OCCUPATIONAL THERAPY | Age: 79
End: 2024-05-30
Payer: MEDICARE

## 2024-05-30 DIAGNOSIS — M77.11 LATERAL EPICONDYLITIS OF RIGHT ELBOW: Primary | ICD-10-CM

## 2024-05-30 PROCEDURE — 97140 MANUAL THERAPY 1/> REGIONS: CPT

## 2024-05-30 PROCEDURE — 97110 THERAPEUTIC EXERCISES: CPT

## 2024-05-30 NOTE — PROGRESS NOTES
Daily Note     Today's date: 2024  Patient name: Leanne Jiang  : 1945  MRN: [id number]  Referring provider: Oj Mcmahon, *  Dx:   Encounter Diagnosis     ICD-10-CM    1. Lateral epicondylitis of right elbow  M77.11                      Subjective: It feels good today. I only feel it after I am done driving. Not as much pain down by the hand.        Objective: See treatment diary below      Assessment: Tolerated treatment fair. No reports of pain with PREs. Cues for formation with scapular strengthening. Patient demonstrated fatigue post treatment and would benefit from continued OT      Plan: Continue per plan of care.      Precautions: Universal      Manuals  eval     IASTM 12 12 10 12 12 12 12 12' 12    KT APPLIED applied applied  applied                                  Neuro Re-Ed                                                                                                        Ther Ex             HEP 12            Ecc wrist stretches  3x30s 3x30s 3x30s 3x30s 3x30s 3x30s 3x30s 3x30s    Shoulder AROM  Scap squeezes 20-30x 30x 5 s holds 20 circles forward and 20 circles back 20x forward    20x back 30x forward    30x backward       Radial Nerve Glides  15-20x           Wrist ecc  2x15 1# 2x10 1# 2x15 1# 2x15 1# 2x15 2#  2x15 2# 30x 2#    S/P  No weight 20x No weight 30x Sm hammer 30x Sm hammer 30x Sm hammer 30x   Sm hammer 30x Sm hammer 30x    EDC  Sm rb 20-30x Sm rb 30x   Sm rb 25x Sm rb 30x Lg rb 30x Lg rb 30x  Lg rb 30x    Grasp  YPW 3x30 center YPW center; side 30x   RPW center 30x RPW center;side 30x      Neck Stretch   3x30s 3x30s both ways 3x30s both ways 3x30s 3x30s 3x30s 3x30s    Scap strengthening    YTB 25x scap add    25x IR    15x ER YTB 30x scap add    25 IR    20 ER YTB scap add, IR, ER 30x YTB add, IR, ER 30x YTB 30x IR, ER , scap add RTB 25x all directions    Wrist iso        RPB/YPB 3x10 all directions RPB 20x all directions RPB  25 x all directions    Gait Training                                       Modalities             MHP applied applied applied applied applied applied applied applied Applied

## 2024-06-04 ENCOUNTER — OFFICE VISIT (OUTPATIENT)
Dept: OCCUPATIONAL THERAPY | Age: 79
End: 2024-06-04
Payer: MEDICARE

## 2024-06-04 DIAGNOSIS — M77.11 LATERAL EPICONDYLITIS OF RIGHT ELBOW: Primary | ICD-10-CM

## 2024-06-04 PROCEDURE — 97140 MANUAL THERAPY 1/> REGIONS: CPT

## 2024-06-04 PROCEDURE — 97110 THERAPEUTIC EXERCISES: CPT

## 2024-06-04 NOTE — PROGRESS NOTES
Daily Note     Today's date: 2024  Patient name: Leanne Jiang  : 1945  MRN: [id number]  Referring provider: Oj Mcmahon, *  Dx:   Encounter Diagnosis     ICD-10-CM    1. Lateral epicondylitis of right elbow  M77.11                        Subjective: It feels okay. I used it a lot this weekend, it was sore.       Objective: See treatment diary below      Assessment: Tolerated treatment fair. No reports of pain with PREs. Cues for formation with scapular strengthening. Patient demonstrated fatigue post treatment and would benefit from continued OT      Plan: Continue per plan of care.      Precautions: Universal      Manuals  eval    IASTM 12 12 10 12 12 12 12 12' 12 12   KT APPLIED applied applied  applied                                  Neuro Re-Ed                                                                                                        Ther Ex             HEP 12            Ecc wrist stretches  3x30s 3x30s 3x30s 3x30s 3x30s 3x30s 3x30s 3x30s 3x30s   Shoulder AROM  Scap squeezes 20-30x 30x 5 s holds 20 circles forward and 20 circles back 20x forward    20x back 30x forward    30x backward       Radial Nerve Glides  15-20x           Wrist ecc  2x15 1# 2x10 1# 2x15 1# 2x15 1# 2x15 2#  2x15 2# 30x 2#    S/P  No weight 20x No weight 30x Sm hammer 30x Sm hammer 30x Sm hammer 30x   Sm hammer 30x Sm hammer 30x Sm hammer 30x   EDC  Sm rb 20-30x Sm rb 30x   Sm rb 25x Sm rb 30x Lg rb 30x Lg rb 30x  Lg rb 30x Y gummy 30x   Grasp  YPW 3x30 center YPW center; side 30x   RPW center 30x RPW center;side 30x   RPW center;side 30x   Neck Stretch   3x30s 3x30s both ways 3x30s both ways 3x30s 3x30s 3x30s 3x30s 3x30s   Scap strengthening    YTB 25x scap add    25x IR    15x ER YTB 30x scap add    25 IR    20 ER YTB scap add, IR, ER 30x YTB add, IR, ER 30x YTB 30x IR, ER , scap add RTB 25x all directions RTB 25x all directions   Wrist iso        RPB/YPB 3x10  all directions RPB 20x all directions RPB 25 x all directions RPB 25x all directions   Gait Training                                       Modalities             MHP applied applied applied applied applied applied applied applied Applied

## 2024-06-06 ENCOUNTER — OFFICE VISIT (OUTPATIENT)
Dept: OCCUPATIONAL THERAPY | Age: 79
End: 2024-06-06
Payer: MEDICARE

## 2024-06-06 DIAGNOSIS — M77.11 LATERAL EPICONDYLITIS OF RIGHT ELBOW: Primary | ICD-10-CM

## 2024-06-06 PROCEDURE — 97110 THERAPEUTIC EXERCISES: CPT

## 2024-06-06 PROCEDURE — 97140 MANUAL THERAPY 1/> REGIONS: CPT

## 2024-06-06 NOTE — PROGRESS NOTES
Daily Note     Today's date: 2024  Patient name: Leanne Jiang  : 1945  MRN: [id number]  Referring provider: Oj Mcmahon, *  Dx:   No diagnosis found.                   Subjective: I felt sore after last session.       Objective: See treatment diary below      Assessment: Tolerated treatment fair. No reports of pain with PREs. Cues for formation with scapular strengthening. Patient demonstrated fatigue post treatment and would benefit from continued OT      Plan: Continue per plan of care.      Precautions: Universal      Manuals  eval    IASTM 12 12 10 12 12 12 12 12' 12 12   KT APPLIED applied applied  applied                                  Neuro Re-Ed                                                                                                        Ther Ex             HEP 12            Ecc wrist stretches 3x30s 3x30s 3x30s 3x30s 3x30s 3x30s 3x30s 3x30s 3x30s 3x30s   Shoulder AROM  Scap squeezes 20-30x 30x 5 s holds 20 circles forward and 20 circles back 20x forward    20x back 30x forward    30x backward       Radial Nerve Glides  15-20x           Wrist ecc 2# 25x 2x15 1# 2x10 1# 2x15 1# 2x15 1# 2x15 2#  2x15 2# 30x 2#    S/P Sm hammer 30x No weight 20x No weight 30x Sm hammer 30x Sm hammer 30x Sm hammer 30x   Sm hammer 30x Sm hammer 30x Sm hammer 30x   EDC Y gummy 30x Sm rb 20-30x Sm rb 30x   Sm rb 25x Sm rb 30x Lg rb 30x Lg rb 30x  Lg rb 30x Y gummy 30x   Grasp GPW center;side 30x    G1 30x YPW 3x30 center YPW center; side 30x   RPW center 30x RPW center;side 30x   RPW center;side 30x   Neck Stretch 3x30s  3x30s 3x30s both ways 3x30s both ways 3x30s 3x30s 3x30s 3x30s 3x30s   Scap strengthening RTB 25x all directions   YTB 25x scap add    25x IR    15x ER YTB 30x scap add    25 IR    20 ER YTB scap add, IR, ER 30x YTB add, IR, ER 30x YTB 30x IR, ER , scap add RTB 25x all directions RTB 25x all directions   Wrist iso  RPB 25x all directions       RPB/YPB 3x10 all directions RPB 20x all directions RPB 25 x all directions RPB 25x all directions   Gait Training                                       Modalities             MHP applied applied applied applied applied applied applied applied Applied

## 2024-06-07 ENCOUNTER — TELEPHONE (OUTPATIENT)
Dept: ENDOCRINOLOGY | Facility: CLINIC | Age: 79
End: 2024-06-07

## 2024-06-11 ENCOUNTER — OFFICE VISIT (OUTPATIENT)
Dept: OCCUPATIONAL THERAPY | Age: 79
End: 2024-06-11
Payer: MEDICARE

## 2024-06-11 DIAGNOSIS — M77.11 LATERAL EPICONDYLITIS OF RIGHT ELBOW: Primary | ICD-10-CM

## 2024-06-11 PROCEDURE — 97140 MANUAL THERAPY 1/> REGIONS: CPT

## 2024-06-11 PROCEDURE — 97110 THERAPEUTIC EXERCISES: CPT

## 2024-06-11 NOTE — PROGRESS NOTES
Daily Note     Today's date: 2024  Patient name: Leanne Jiang  : 1945  MRN: [id number]  Referring provider: Oj Mcmahon, *  Dx:   Encounter Diagnosis     ICD-10-CM    1. Lateral epicondylitis of right elbow  M77.11                          Subjective: I was sore.        Objective: See treatment diary below      Assessment: Tolerated treatment fair. No reports of pain with PREs. Tolerated upgrade in repetitions well. Patient demonstrated fatigue post treatment and would benefit from continued OT      Plan: Continue per plan of care.      Precautions: Universal      Manuals    IASTM 12 12 10 12 12 12 12 12' 12 12   KT  applied applied  applied                                  Neuro Re-Ed                                                                                                        Ther Ex             HEP             Ecc wrist stretches 3x30s 3x30s 3x30s 3x30s 3x30s 3x30s 3x30s 3x30s 3x30s 3x30s   Shoulder AROM  Scap squeezes 20-30x 30x 5 s holds 20 circles forward and 20 circles back 20x forward    20x back 30x forward    30x backward       Radial Nerve Glides  15-20x           Wrist ecc 2# 30x 2x15 1# 2x10 1# 2x15 1# 2x15 1# 2x15 2#  2x15 2# 30x 2#    S/P  No weight 20x No weight 30x Sm hammer 30x Sm hammer 30x Sm hammer 30x   Sm hammer 30x Sm hammer 30x Sm hammer 30x   EDC Y gummy 30x Sm rb 20-30x Sm rb 30x   Sm rb 25x Sm rb 30x Lg rb 30x Lg rb 30x  Lg rb 30x Y gummy 30x   Grasp     G1 30x YPW 3x30 center YPW center; side 30x   RPW center 30x RPW center;side 30x   RPW center;side 30x   Neck Stretch 3x30s  3x30s 3x30s both ways 3x30s both ways 3x30s 3x30s 3x30s 3x30s 3x30s   Scap strengthening RTB 30x all directions   YTB 25x scap add    25x IR    15x ER YTB 30x scap add    25 IR    20 ER YTB scap add, IR, ER 30x YTB add, IR, ER 30x YTB 30x IR, ER , scap add RTB 25x all directions RTB 25x all directions   Wrist iso  RPB 30x all directions       RPB/YPB 3x10 all directions RPB 20x all directions RPB 25 x all directions RPB 25x all directions   Gait Training                                       Modalities             MHP applied applied applied applied applied applied applied applied Applied                       no chest pain and no edema.

## 2024-06-13 ENCOUNTER — OFFICE VISIT (OUTPATIENT)
Dept: OCCUPATIONAL THERAPY | Age: 79
End: 2024-06-13
Payer: MEDICARE

## 2024-06-13 DIAGNOSIS — M77.11 LATERAL EPICONDYLITIS OF RIGHT ELBOW: Primary | ICD-10-CM

## 2024-06-13 PROCEDURE — 97110 THERAPEUTIC EXERCISES: CPT

## 2024-06-13 PROCEDURE — 97140 MANUAL THERAPY 1/> REGIONS: CPT

## 2024-06-13 NOTE — PROGRESS NOTES
Daily Note     Today's date: 2024  Patient name: Leanne Jiang  : 1945  MRN: [id number]  Referring provider: Oj Mcmahon, *  Dx:   Encounter Diagnosis     ICD-10-CM    1. Lateral epicondylitis of right elbow  M77.11                      Subjective: I was sore.        Objective: See treatment diary below      Assessment: Tolerated treatment fair. No reports of pain with PREs. Tolerated upgrade in repetitions well. Pt will continue with HEP.     Plan: D/C- Son recently had stroke and needs to focus on getting him set up. No longer having pain in the elbow with activities.      Precautions: Universal      Manuals    IASTM 12 12 10 12 12 12 12 12' 12 12   KT   applied  applied                                  Neuro Re-Ed                                                                                                        Ther Ex             HEP             Ecc wrist stretches 3x30s 3x30s 3x30s 3x30s 3x30s 3x30s 3x30s 3x30s 3x30s 3x30s   Shoulder AROM   30x 5 s holds 20 circles forward and 20 circles back 20x forward    20x back 30x forward    30x backward       Radial Nerve Glides             Wrist ecc 2# 30x 2# 30x 2x10 1# 2x15 1# 2x15 1# 2x15 2#  2x15 2# 30x 2#    S/P   No weight 30x Sm hammer 30x Sm hammer 30x Sm hammer 30x   Sm hammer 30x Sm hammer 30x Sm hammer 30x   EDC Y gummy 30x Y gummy x30 Sm rb 30x   Sm rb 25x Sm rb 30x Lg rb 30x Lg rb 30x  Lg rb 30x Y gummy 30x   Grasp     G1 30x G1 30x YPW center; side 30x   RPW center 30x RPW center;side 30x   RPW center;side 30x   Neck Stretch 3x30s 3x30s 3x30s 3x30s both ways 3x30s both ways 3x30s 3x30s 3x30s 3x30s 3x30s   Scap strengthening RTB 30x all directions RTB 30x all directions  YTB 25x scap add    25x IR    15x ER YTB 30x scap add    25 IR    20 ER YTB scap add, IR, ER 30x YTB add, IR, ER 30x YTB 30x IR, ER , scap add RTB 25x all directions RTB 25x all directions   Wrist iso  RPB 30x all  directions RPB 30x all directions     RPB/YPB 3x10 all directions RPB 20x all directions RPB 25 x all directions RPB 25x all directions   Gait Training                                       Modalities             MHP applied applied applied applied applied applied applied applied Applied

## 2024-08-26 ENCOUNTER — LAB (OUTPATIENT)
Dept: LAB | Age: 79
End: 2024-08-26
Payer: MEDICARE

## 2024-08-26 DIAGNOSIS — E11.65 TYPE 2 DIABETES MELLITUS WITH HYPERGLYCEMIA, WITH LONG-TERM CURRENT USE OF INSULIN (HCC): ICD-10-CM

## 2024-08-26 DIAGNOSIS — Z79.4 TYPE 2 DIABETES MELLITUS WITH HYPERGLYCEMIA, WITH LONG-TERM CURRENT USE OF INSULIN (HCC): ICD-10-CM

## 2024-08-26 DIAGNOSIS — E78.5 HYPERLIPIDEMIA, UNSPECIFIED HYPERLIPIDEMIA TYPE: ICD-10-CM

## 2024-08-26 LAB
ALBUMIN SERPL BCG-MCNC: 3.9 G/DL (ref 3.5–5)
ALP SERPL-CCNC: 56 U/L (ref 34–104)
ALT SERPL W P-5'-P-CCNC: 8 U/L (ref 7–52)
ANION GAP SERPL CALCULATED.3IONS-SCNC: 10 MMOL/L (ref 4–13)
AST SERPL W P-5'-P-CCNC: 12 U/L (ref 13–39)
BILIRUB SERPL-MCNC: 0.46 MG/DL (ref 0.2–1)
BUN SERPL-MCNC: 21 MG/DL (ref 5–25)
CALCIUM SERPL-MCNC: 9.8 MG/DL (ref 8.4–10.2)
CHLORIDE SERPL-SCNC: 102 MMOL/L (ref 96–108)
CHOLEST SERPL-MCNC: 118 MG/DL
CO2 SERPL-SCNC: 26 MMOL/L (ref 21–32)
CREAT SERPL-MCNC: 0.63 MG/DL (ref 0.6–1.3)
EST. AVERAGE GLUCOSE BLD GHB EST-MCNC: 154 MG/DL
GFR SERPL CREATININE-BSD FRML MDRD: 86 ML/MIN/1.73SQ M
GLUCOSE P FAST SERPL-MCNC: 97 MG/DL (ref 65–99)
HBA1C MFR BLD: 7 %
HDLC SERPL-MCNC: 38 MG/DL
LDLC SERPL CALC-MCNC: 52 MG/DL (ref 0–100)
POTASSIUM SERPL-SCNC: 4.3 MMOL/L (ref 3.5–5.3)
PROT SERPL-MCNC: 6.9 G/DL (ref 6.4–8.4)
SODIUM SERPL-SCNC: 138 MMOL/L (ref 135–147)
TRIGL SERPL-MCNC: 142 MG/DL

## 2024-08-26 PROCEDURE — 36415 COLL VENOUS BLD VENIPUNCTURE: CPT

## 2024-08-26 PROCEDURE — 83036 HEMOGLOBIN GLYCOSYLATED A1C: CPT

## 2024-08-26 PROCEDURE — 80061 LIPID PANEL: CPT

## 2024-08-26 PROCEDURE — 80053 COMPREHEN METABOLIC PANEL: CPT

## 2024-08-27 NOTE — RESULT ENCOUNTER NOTE
Diabetes and cholesterol is under good control.  She needs a 6-month appointment from her last appointment.

## 2024-09-13 ENCOUNTER — TELEPHONE (OUTPATIENT)
Dept: ENDOCRINOLOGY | Facility: CLINIC | Age: 79
End: 2024-09-13

## 2024-09-18 ENCOUNTER — OFFICE VISIT (OUTPATIENT)
Dept: ENDOCRINOLOGY | Facility: CLINIC | Age: 79
End: 2024-09-18
Payer: MEDICARE

## 2024-09-18 VITALS
DIASTOLIC BLOOD PRESSURE: 88 MMHG | BODY MASS INDEX: 27.79 KG/M2 | SYSTOLIC BLOOD PRESSURE: 142 MMHG | WEIGHT: 147.2 LBS | HEART RATE: 71 BPM | HEIGHT: 61 IN

## 2024-09-18 DIAGNOSIS — E04.1 THYROID NODULE: ICD-10-CM

## 2024-09-18 DIAGNOSIS — E11.29 TYPE 2 DIABETES MELLITUS WITH DIABETIC MICROALBUMINURIA, WITH LONG-TERM CURRENT USE OF INSULIN (HCC): ICD-10-CM

## 2024-09-18 DIAGNOSIS — R80.9 TYPE 2 DIABETES MELLITUS WITH DIABETIC MICROALBUMINURIA, WITH LONG-TERM CURRENT USE OF INSULIN (HCC): ICD-10-CM

## 2024-09-18 DIAGNOSIS — E55.9 VITAMIN D DEFICIENCY: ICD-10-CM

## 2024-09-18 DIAGNOSIS — Z79.4 TYPE 2 DIABETES MELLITUS WITH DIABETIC MICROALBUMINURIA, WITH LONG-TERM CURRENT USE OF INSULIN (HCC): ICD-10-CM

## 2024-09-18 DIAGNOSIS — E11.65 TYPE 2 DIABETES MELLITUS WITH HYPERGLYCEMIA, WITH LONG-TERM CURRENT USE OF INSULIN (HCC): Primary | ICD-10-CM

## 2024-09-18 DIAGNOSIS — I10 PRIMARY HYPERTENSION: ICD-10-CM

## 2024-09-18 DIAGNOSIS — Z79.4 TYPE 2 DIABETES MELLITUS WITH HYPERGLYCEMIA, WITH LONG-TERM CURRENT USE OF INSULIN (HCC): Primary | ICD-10-CM

## 2024-09-18 DIAGNOSIS — E78.2 MIXED HYPERLIPIDEMIA: ICD-10-CM

## 2024-09-18 PROCEDURE — 99214 OFFICE O/P EST MOD 30 MIN: CPT | Performed by: INTERNAL MEDICINE

## 2024-09-18 PROCEDURE — 95251 CONT GLUC MNTR ANALYSIS I&R: CPT | Performed by: INTERNAL MEDICINE

## 2024-09-18 RX ORDER — INSULIN GLARGINE 100 [IU]/ML
INJECTION, SOLUTION SUBCUTANEOUS
Start: 2024-09-18

## 2024-09-18 NOTE — ASSESSMENT & PLAN NOTE
Due to the albuminuria, add Jardiance 25 mg/day.  We will apply for patient assistance for the Jardiance.  In the meantime, I have given her samples of Jardiance 10 mg/day.  Additionally, I have asked her to take her insulin in the morning instead of at night to decrease the risk of hypoglycemia.  Lab Results   Component Value Date    HGBA1C 7.0 (H) 08/26/2024       Orders:    Insulin Glargine Solostar (Basaglar Jodie) 100 UNIT/ML SOPN; DIAL AND INJECT 25 UNITS UNDER THE SKIN EVERY MORNING. MAX DAILY DOSE IS 25 UNITS.    Empagliflozin 25 MG TABS; Take 1 tablet (25 mg total) by mouth every morning

## 2024-09-18 NOTE — ASSESSMENT & PLAN NOTE
Blood pressure is elevated today.  Will need to monitor this over time.  This is likely playing a role in her albuminuria.

## 2024-09-18 NOTE — PROGRESS NOTES
Ambulatory Visit  Name: Leanne Jiang      : 1945      MRN: [id number]  Encounter Provider: Emma Shaikh MD  Encounter Date: 2024   Encounter department: [facility] [place]    Assessment & Plan  Primary hypertension  Blood pressure is elevated today.  Will need to monitor this over time.  This is likely playing a role in her albuminuria.         Thyroid nodule  This is stable.  Continue to monitor with clinical exam.         Type 2 diabetes mellitus with hyperglycemia, with long-term current use of insulin (HCC)  Due to the albuminuria, add Jardiance 25 mg/day.  We will apply for patient assistance for the Jardiance.  In the meantime, I have given her samples of Jardiance 10 mg/day.  Additionally, I have asked her to take her insulin in the morning instead of at night to decrease the risk of hypoglycemia.  Lab Results   Component Value Date    HGBA1C 7.0 (H) 2024       Orders:    Insulin Glargine Solostar (Basaglar KwikPen) 100 UNIT/ML SOPN; DIAL AND INJECT 25 UNITS UNDER THE SKIN EVERY MORNING. MAX DAILY DOSE IS 25 UNITS.    Empagliflozin 25 MG TABS; Take 1 tablet (25 mg total) by mouth every morning    Mixed hyperlipidemia  Continue statin.         Vitamin D deficiency  Continue vitamin D supplementation.         Type 2 diabetes mellitus with diabetic microalbuminuria, with long-term current use of insulin (HCC)  Due to the albuminuria, add Jardiance 25 mg/day.  We will apply for patient assistance for the Jardiance.  In the meantime, I have given her samples of Jardiance 10 mg/day.  Additionally, I have asked her to take her insulin in the morning instead of at night to decrease the risk of hypoglycemia.  Lab Results   Component Value Date    HGBA1C 7.0 (H) 2024              History of Present Illness     Leanne Jiang is a 79 y.o. female who presents for follow-up of type 2 diabetes.  She uses a continuous glucose monitor.  She is on basal  insulin, glimepiride.  She rarely has hypoglycemia.  Her diabetes is complicated by albuminuria.    History obtained from : patient  Review of Systems   Constitutional:  Negative for chills and fever.   Respiratory:  Negative for shortness of breath.    Cardiovascular:  Negative for chest pain.   Gastrointestinal:  Negative for constipation, diarrhea, nausea and vomiting.   All other systems reviewed and are negative.    Current Outpatient Medications on File Prior to Visit   Medication Sig Dispense Refill    apixaban (ELIQUIS) 5 mg Take 5 mg by mouth 2 (two) times a day      atorvastatin (LIPITOR) 20 mg tablet Take by mouth daily at bedtime       Blood Glucose Monitoring Suppl (ONETOUCH VERIO) w/Device KIT Use to check BG  DX E11.9, patient will check blood sugars 3 times daily 1 kit 1    Cholecalciferol (Vitamin D3) 50 MCG (2000 UT) capsule 2000 units twice per day 30 capsule 5    glimepiride (AMARYL) 2 mg tablet 1 tab with breakfast, 1/2 tab with dinner (Patient taking differently: 1/2 tab with breakfast, 1/2 tab with dinner) 180 tablet 3    Insulin Pen Needle (BD Pen Needle Carol Ann U/F) 32G X 4 MM MISC Use daily 100 each 3    latanoprost (XALATAN) 0.005 % ophthalmic solution Administer 1 drop to both eyes daily at bedtime      losartan (COZAAR) 100 MG tablet Take 100 mg by mouth daily      metFORMIN (GLUCOPHAGE) 1000 MG tablet TAKE 1 TABLET BY MOUTH TWICE  DAILY WITH MEALS 180 tablet 1    ONETOUCH DELICA LANCETS 30G MISC TEST BLOOD SUGARS THREE TIMES DAILY      OneTouch Verio test strip CHECK BLOOD GLUCOSE THREE TIMES DAILY 300 strip 1    propranolol (INDERAL) 20 mg tablet Take 20 mg by mouth Two tablets a day      Trulicity 0.75 MG/0.5ML injection INJECT 0.75MG (0.5ML) UNDER THE SKIN ONCE A WEEK. 8 mL 0    [DISCONTINUED] Insulin Glargine Solostar (Basaglar KwikPen) 100 UNIT/ML SOPN DIAL AND INJECT 25 UNITS UNDER THE SKIN DAILY. MAX DAILY DOSE IS 25 UNITS. (Patient taking differently: DIAL AND INJECT 15 UNITS UNDER  "THE SKIN DAILY. MAX DAILY DOSE IS 25 UNITS.) 30 mL 1     No current facility-administered medications on file prior to visit.          Objective     /88   Pulse 71   Ht 5' 1\" (1.549 m)   Wt 66.8 kg (147 lb 3.2 oz)   BMI 27.81 kg/m²     Physical Exam  Vitals and nursing note reviewed.   Constitutional:       Appearance: Normal appearance.   HENT:      Head: Normocephalic and atraumatic.   Eyes:      General: No scleral icterus.        Right eye: No discharge.         Left eye: No discharge.   Neck:      Comments: A 1 cm left upper pole nodule was present.  Pulmonary:      Effort: Pulmonary effort is normal.   Musculoskeletal:         General: Normal range of motion.      Cervical back: Normal range of motion.   Skin:     Coloration: Skin is not jaundiced.   Neurological:      Mental Status: She is alert and oriented to person, place, and time.   Psychiatric:         Mood and Affect: Mood normal.         Behavior: Behavior normal.     [employer]   Device used Dexcom  Home use       Indication     Type 2 Diabetes    More than 72 hours of data was reviewed. Report to be scanned to chart.     Date Range: September 5, 2024 to September 18, 2024    Analysis of data:   % time CGM used: 86%  Average Glucose: 147 mg/dL  Coefficient of Variation: x   SD : 45 mg/dL  Time in Target Range: 74%  Time Above Range: 24% high and 1% very high  Time Below Range: 0%    Interpretation of data: Jardiance was added to her regimen and her insulin therapy was changed to morning instead of at night.      "

## 2024-09-18 NOTE — ASSESSMENT & PLAN NOTE
Due to the albuminuria, add Jardiance 25 mg/day.  We will apply for patient assistance for the Jardiance.  In the meantime, I have given her samples of Jardiance 10 mg/day.  Additionally, I have asked her to take her insulin in the morning instead of at night to decrease the risk of hypoglycemia.  Lab Results   Component Value Date    HGBA1C 7.0 (H) 08/26/2024

## 2024-09-19 ENCOUNTER — TELEPHONE (OUTPATIENT)
Age: 79
End: 2024-09-19

## 2024-09-19 NOTE — TELEPHONE ENCOUNTER
Pt called  she is not eligible  for assistance on jardiance. It will cost her 1700.00 for 3 months.  Pt cannot afford this. Is there a generic, That is cheaper?  She isnt going to start the samples she got until she hears from you. Pt also is going to make an appt with her insurance company about changing her plan that maybe that will help also with medications

## 2024-09-20 DIAGNOSIS — E11.65 TYPE 2 DIABETES MELLITUS WITH HYPERGLYCEMIA, WITH LONG-TERM CURRENT USE OF INSULIN (HCC): ICD-10-CM

## 2024-09-20 DIAGNOSIS — Z79.4 TYPE 2 DIABETES MELLITUS WITH HYPERGLYCEMIA, WITH LONG-TERM CURRENT USE OF INSULIN (HCC): ICD-10-CM

## 2024-09-20 RX ORDER — GLIMEPIRIDE 2 MG/1
TABLET ORAL
Qty: 180 TABLET | Refills: 1 | Status: SHIPPED | OUTPATIENT
Start: 2024-09-20

## 2024-10-08 ENCOUNTER — TELEPHONE (OUTPATIENT)
Dept: ENDOCRINOLOGY | Facility: CLINIC | Age: 79
End: 2024-10-08

## 2024-10-17 ENCOUNTER — TELEPHONE (OUTPATIENT)
Age: 79
End: 2024-10-17

## 2024-10-17 ENCOUNTER — TELEPHONE (OUTPATIENT)
Dept: ENDOCRINOLOGY | Facility: CLINIC | Age: 79
End: 2024-10-17

## 2024-10-17 DIAGNOSIS — Z79.4 TYPE 2 DIABETES MELLITUS WITH HYPERGLYCEMIA, WITH LONG-TERM CURRENT USE OF INSULIN (HCC): Primary | ICD-10-CM

## 2024-10-17 DIAGNOSIS — E11.65 TYPE 2 DIABETES MELLITUS WITH HYPERGLYCEMIA, WITH LONG-TERM CURRENT USE OF INSULIN (HCC): Primary | ICD-10-CM

## 2024-10-17 NOTE — TELEPHONE ENCOUNTER
Patient called back and advised that her insurance company said they cover Farxiga, it is expensive but the patient said she is willing to try it.    She is asking that we send the script to [facility].

## 2024-10-18 RX ORDER — DAPAGLIFLOZIN 5 MG/1
TABLET, FILM COATED ORAL
Qty: 90 TABLET | Refills: 0 | Status: SHIPPED | OUTPATIENT
Start: 2024-10-18

## 2024-10-22 ENCOUNTER — TELEPHONE (OUTPATIENT)
Dept: NEPHROLOGY | Facility: CLINIC | Age: 79
End: 2024-10-22

## 2024-10-22 NOTE — TELEPHONE ENCOUNTER
Called patient and left a voicemail to see if she can come in for a sooner appointment, not interested because wants to see Dr. Deluna.

## 2024-11-14 ENCOUNTER — TELEPHONE (OUTPATIENT)
Age: 79
End: 2024-11-14

## 2024-11-14 NOTE — TELEPHONE ENCOUNTER
Patient's granddaughter Rebecca calling in said that [facility] now will be doing their applications online. She wanted to start patient's application soon and wanted to know the best way to get this to our office, she said they told her it will have to be e mailed to the office. Please advise.  They said they can still do paper applications if needed, but the paper applications will be delayed. Please advise and call Rebecca at [phone].

## 2024-11-21 DIAGNOSIS — Z79.4 TYPE 2 DIABETES MELLITUS WITH HYPERGLYCEMIA, WITH LONG-TERM CURRENT USE OF INSULIN (HCC): ICD-10-CM

## 2024-11-21 DIAGNOSIS — E11.65 TYPE 2 DIABETES MELLITUS WITH HYPERGLYCEMIA, WITH LONG-TERM CURRENT USE OF INSULIN (HCC): ICD-10-CM

## 2024-12-10 ENCOUNTER — APPOINTMENT (OUTPATIENT)
Dept: RADIOLOGY | Age: 79
End: 2024-12-10
Payer: MEDICARE

## 2024-12-10 DIAGNOSIS — J18.9 UNRESOLVED PNEUMONIA: ICD-10-CM

## 2024-12-10 PROCEDURE — 71046 X-RAY EXAM CHEST 2 VIEWS: CPT

## 2025-01-02 DIAGNOSIS — Z79.4 TYPE 2 DIABETES MELLITUS WITH HYPERGLYCEMIA, WITH LONG-TERM CURRENT USE OF INSULIN (HCC): ICD-10-CM

## 2025-01-02 DIAGNOSIS — E11.65 TYPE 2 DIABETES MELLITUS WITH HYPERGLYCEMIA, WITH LONG-TERM CURRENT USE OF INSULIN (HCC): ICD-10-CM

## 2025-01-06 RX ORDER — DAPAGLIFLOZIN 5 MG/1
5 TABLET, FILM COATED ORAL DAILY
Qty: 90 TABLET | Refills: 3 | Status: SHIPPED | OUTPATIENT
Start: 2025-01-06

## 2025-01-07 ENCOUNTER — OFFICE VISIT (OUTPATIENT)
Dept: NEPHROLOGY | Facility: CLINIC | Age: 80
End: 2025-01-07
Payer: MEDICARE

## 2025-01-07 VITALS
HEIGHT: 61 IN | WEIGHT: 141 LBS | SYSTOLIC BLOOD PRESSURE: 148 MMHG | DIASTOLIC BLOOD PRESSURE: 78 MMHG | BODY MASS INDEX: 26.62 KG/M2 | HEART RATE: 78 BPM

## 2025-01-07 DIAGNOSIS — Z79.4 TYPE 2 DIABETES MELLITUS WITH HYPERGLYCEMIA, WITH LONG-TERM CURRENT USE OF INSULIN (HCC): ICD-10-CM

## 2025-01-07 DIAGNOSIS — R80.8 OTHER PROTEINURIA: Primary | ICD-10-CM

## 2025-01-07 DIAGNOSIS — E11.65 TYPE 2 DIABETES MELLITUS WITH HYPERGLYCEMIA, WITH LONG-TERM CURRENT USE OF INSULIN (HCC): ICD-10-CM

## 2025-01-07 PROBLEM — R80.9 PROTEINURIA: Status: ACTIVE | Noted: 2025-01-07

## 2025-01-07 PROCEDURE — 99204 OFFICE O/P NEW MOD 45 MIN: CPT | Performed by: INTERNAL MEDICINE

## 2025-01-07 NOTE — PROGRESS NOTES
Name: Leanne Jiang      : 1945      MRN: [id number]  Encounter Provider: Max Deluna MD  Encounter Date: 2025   Encounter department: [facility]  :  Assessment & Plan  Other proteinuria    The patient is a longstanding diabetic and in the past she did not have excellent glycemic control as they remember some of the A1c's were more than 9%.  She has been monitored routinely and I reviewed levels since 2016 for urine microalbumin.  Her most recent evaluation in February of last year showed that the protein estimation increased to more than 800 so she now has overt proteinuria which could indicate that there is the initiation of diabetic nephropathy and I explained this to her.    I explained that the natural history is that you start off potentially with a little bit of protein in the urine meaning less than 300 mg and then if it increases it may mean that diabetes is starting to affect the kidneys.    I reinforced to her that at this point her kidney function is totally normal and there is no evidence of functional damage as her creatinine is less than 1.    Her urine microalbumin screen was 800 mg so at this point we should monitor protein estimations with total protein to creatinine as the microalbumin screens more than 300 mg.    She did have a normal creatinine 6 months after the microalbumin level had increased so I do think it is likely related to diabetes.    I explained in detail that at this point you implement measures to help delay progression with sugar control target A1c of 7%.  She said since she has been wearing the Dexcom her blood sugars are even better and it is estimating her A1c is 6.7%.  Her blood pressure is borderline she says it tends to go up when she sees physicians so we will follow that.  Lipid panel is excellent and she has been on an ARB.  Recently Farxiga was added as Jardiance was too expensive and I explained to her that there may be some  evidence that it helps diabetic so protein in the urine so she can continue as long as it is affordable.    I am going to check a urine protein to creatinine ratio now to see where the protein estimation is gone over the last year.    If things are stable we will continue on her current medications follow-up in 6 months with a BMP and urine protein creatinine ratio.    I told him to call me if there is any problems or concerns before next visit.    I have spent a total time of 60 minutes in caring for this patient on the day of the visit/encounter including Diagnostic results, Prognosis, Instructions for management, Patient and family education, Reviewing / ordering tests, medicine, procedures  , and Obtaining or reviewing history  .     Orders:    Protein / creatinine ratio, urine; Future    Basic metabolic panel; Future    Protein / creatinine ratio, urine; Future    Type 2 diabetes mellitus with hyperglycemia, with long-term current use of insulin ([facility])    Lab Results   Component Value Date    HGBA1C 7.0 (H) 08/26/2024     She says since she has been on her Dexcom it is estimating her A1c is less than 7% and she notices her sugars are better she will follow-up with her endocrinologist.           History of Present Illness   HPI  Leanne Jiang is a 79 y.o. female who presents for her first visit.  The patient is a longstanding diabetic and it was noticed that protein estimation was significantly higher when it was last checked in her family having some experience with kidney physicians felt that this should be evaluated and she has made an appointment.  The patient says she is doing great has no complaints feels well has been doing better with her sugars.  History obtained from: patient    Review of Systems   Constitutional:  Negative for appetite change, chills, diaphoresis and fever.   HENT:  Positive for hearing loss.    Eyes: Negative.    Respiratory:  Negative for cough, chest tightness and shortness of  "breath.    Cardiovascular:  Negative for chest pain, palpitations and leg swelling.   Gastrointestinal:  Negative for abdominal pain, diarrhea, nausea and vomiting.   Genitourinary: Negative.    Neurological:  Negative for dizziness and headaches.   Psychiatric/Behavioral:  Negative for agitation, behavioral problems, confusion and decreased concentration.           Objective   /78 (BP Location: Left arm, Patient Position: Sitting, Cuff Size: Standard)   Pulse 78   Ht 5' 1\" (1.549 m)   Wt 64 kg (141 lb)   BMI 26.64 kg/m²      Physical Exam  Constitutional:       General: She is not in acute distress.     Appearance: She is not ill-appearing or toxic-appearing.   HENT:      Head: Normocephalic and atraumatic.      Nose: Nose normal.      Mouth/Throat:      Mouth: Mucous membranes are moist.   Eyes:      General: No scleral icterus.     Extraocular Movements: Extraocular movements intact.   Cardiovascular:      Rate and Rhythm: Normal rate and regular rhythm.      Heart sounds:      No gallop.      Comments: No edema.  Pulmonary:      Effort: Pulmonary effort is normal. No respiratory distress.      Breath sounds: No wheezing or rales.   Abdominal:      General: Bowel sounds are normal. There is no distension.      Palpations: Abdomen is soft.      Tenderness: There is no abdominal tenderness.   Neurological:      Mental Status: She is alert and oriented to person, place, and time. Mental status is at baseline.      Comments: Positive tremor.   Psychiatric:         Mood and Affect: Mood normal.         Behavior: Behavior normal.           "

## 2025-01-07 NOTE — LETTER
2025     Garth Garcia MD  [address]  [place] [address]    Patient: Leanne Jiang   YOB: 1945   Date of Visit: 2025       Dear Dr. Garcia:    Thank you for referring Leanne Jiang to me for evaluation. Below are my notes for this consultation.    If you have questions, please do not hesitate to call me. I look forward to following your patient along with you.         Sincerely,        Max Deluna MD        CC: No Recipients    Max Deluna MD  2025  2:47 PM  Sign when Signing Visit  Name: Leanne Jiang      : 1945      MRN: [id number]  Encounter Provider: Max Deluna MD  Encounter Date: 2025   Encounter department: [facility] [place]  :  Assessment & Plan  Other proteinuria    The patient is a longstanding diabetic and in the past she did not have excellent glycemic control as they remember some of the A1c's were more than 9%.  She has been monitored routinely and I reviewed levels since 2016 for urine microalbumin.  Her most recent evaluation in February of last year showed that the protein estimation increased to more than 800 so she now has overt proteinuria which could indicate that there is the initiation of diabetic nephropathy and I explained this to her.    I explained that the natural history is that you start off potentially with a little bit of protein in the urine meaning less than 300 mg and then if it increases it may mean that diabetes is starting to affect the kidneys.    I reinforced to her that at this point her kidney function is totally normal and there is no evidence of functional damage as her creatinine is less than 1.    Her urine microalbumin screen was 800 mg so at this point we should monitor protein estimations with total protein to creatinine as the microalbumin screens more than 300 mg.    She did have a normal creatinine 6 months after the microalbumin level had increased so I do think it is likely  related to diabetes.    I explained in detail that at this point you implement measures to help delay progression with sugar control target A1c of 7%.  She said since she has been wearing the Dexcom her blood sugars are even better and it is estimating her A1c is 6.7%.  Her blood pressure is borderline she says it tends to go up when she sees physicians so we will follow that.  Lipid panel is excellent and she has been on an ARB.  Recently Farxiga was added as Jardiance was too expensive and I explained to her that there may be some evidence that it helps diabetic so protein in the urine so she can continue as long as it is affordable.    I am going to check a urine protein to creatinine ratio now to see where the protein estimation is gone over the last year.    If things are stable we will continue on her current medications follow-up in 6 months with a BMP and urine protein creatinine ratio.    I told him to call me if there is any problems or concerns before next visit.    Orders:  •  Protein / creatinine ratio, urine; Future  •  Basic metabolic panel; Future  •  Protein / creatinine ratio, urine; Future    Type 2 diabetes mellitus with hyperglycemia, with long-term current use of insulin ([facility])    Lab Results   Component Value Date    HGBA1C 7.0 (H) 08/26/2024     She says since she has been on her Dexcom it is estimating her A1c is less than 7% and she notices her sugars are better she will follow-up with her endocrinologist.           History of Present Illness  HPI  Leanne Jiang is a 79 y.o. female who presents for her first visit.  The patient is a longstanding diabetic and it was noticed that protein estimation was significantly higher when it was last checked in her family having some experience with kidney physicians felt that this should be evaluated and she has made an appointment.  The patient says she is doing great has no complaints feels well has been doing better with her sugars.  History obtained  "from: patient    Review of Systems   Constitutional:  Negative for appetite change, chills, diaphoresis and fever.   HENT:  Positive for hearing loss.    Eyes: Negative.    Respiratory:  Negative for cough, chest tightness and shortness of breath.    Cardiovascular:  Negative for chest pain, palpitations and leg swelling.   Gastrointestinal:  Negative for abdominal pain, diarrhea, nausea and vomiting.   Genitourinary: Negative.    Neurological:  Negative for dizziness and headaches.   Psychiatric/Behavioral:  Negative for agitation, behavioral problems, confusion and decreased concentration.           Objective  /78 (BP Location: Left arm, Patient Position: Sitting, Cuff Size: Standard)   Pulse 78   Ht 5' 1\" (1.549 m)   Wt 64 kg (141 lb)   BMI 26.64 kg/m²      Physical Exam  Constitutional:       General: She is not in acute distress.     Appearance: She is not ill-appearing or toxic-appearing.   HENT:      Head: Normocephalic and atraumatic.      Nose: Nose normal.      Mouth/Throat:      Mouth: Mucous membranes are moist.   Eyes:      General: No scleral icterus.     Extraocular Movements: Extraocular movements intact.   Cardiovascular:      Rate and Rhythm: Normal rate and regular rhythm.      Heart sounds:      No gallop.      Comments: No edema.  Pulmonary:      Effort: Pulmonary effort is normal. No respiratory distress.      Breath sounds: No wheezing or rales.   Abdominal:      General: Bowel sounds are normal. There is no distension.      Palpations: Abdomen is soft.      Tenderness: There is no abdominal tenderness.   Neurological:      Mental Status: She is alert and oriented to person, place, and time. Mental status is at baseline.      Comments: Positive tremor.   Psychiatric:         Mood and Affect: Mood normal.         Behavior: Behavior normal.           "

## 2025-01-07 NOTE — ASSESSMENT & PLAN NOTE
The patient is a longstanding diabetic and in the past she did not have excellent glycemic control as they remember some of the A1c's were more than 9%.  She has been monitored routinely and I reviewed levels since 2016 for urine microalbumin.  Her most recent evaluation in February of last year showed that the protein estimation increased to more than 800 so she now has overt proteinuria which could indicate that there is the initiation of diabetic nephropathy and I explained this to her.    I explained that the natural history is that you start off potentially with a little bit of protein in the urine meaning less than 300 mg and then if it increases it may mean that diabetes is starting to affect the kidneys.    I reinforced to her that at this point her kidney function is totally normal and there is no evidence of functional damage as her creatinine is less than 1.    Her urine microalbumin screen was 800 mg so at this point we should monitor protein estimations with total protein to creatinine as the microalbumin screens more than 300 mg.    She did have a normal creatinine 6 months after the microalbumin level had increased so I do think it is likely related to diabetes.    I explained in detail that at this point you implement measures to help delay progression with sugar control target A1c of 7%.  She said since she has been wearing the Dexcom her blood sugars are even better and it is estimating her A1c is 6.7%.  Her blood pressure is borderline she says it tends to go up when she sees physicians so we will follow that.  Lipid panel is excellent and she has been on an ARB.  Recently Farxiga was added as Jardiance was too expensive and I explained to her that there may be some evidence that it helps diabetic so protein in the urine so she can continue as long as it is affordable.    I am going to check a urine protein to creatinine ratio now to see where the protein estimation is gone over the last  year.    If things are stable we will continue on her current medications follow-up in 6 months with a BMP and urine protein creatinine ratio.    I told him to call me if there is any problems or concerns before next visit.    I have spent a total time of 60 minutes in caring for this patient on the day of the visit/encounter including Diagnostic results, Prognosis, Instructions for management, Patient and family education, Reviewing / ordering tests, medicine, procedures  , and Obtaining or reviewing history  .     Orders:    Protein / creatinine ratio, urine; Future    Basic metabolic panel; Future    Protein / creatinine ratio, urine; Future

## 2025-01-07 NOTE — PATIENT INSTRUCTIONS
You are here for your first visit it was great to meet you.  You were referred in because protein in your urine had increased when it was last checked.  We discussed that being a diabetic puts patients at risk for kidney disease in the first thing to shop is protein in the urine.  And what I mean by that is your function is totally normal the creatinine blood test is normal there is no evidence of any damage in terms of the function of the kidneys.    So your doctors been monitoring the protein in the urine over years and it is gone to 800 mg so at this point we will go to manage he was if diabetes is affecting the kidneys.    The way to help slow damage his sugar control target A1c less than 7% since you have your new Dexcom your sugar is improved so even though you did fairly well it looks like it is even better because you said it is less than 7% A1c.  Blood pressure is up and down at times we will monitor that if we need to adjust medications we will do that.  Cholesterol is excellent you are on an ARB.  Farxiga is relatively new and it suggested that it may help delay damage in diabetics with protein in the urine so at this point continue and we will assess the amount of protein in the urine.    The only test for now is unguinal order a urine total protein assessment as the last one was about a year ago to see if that is changed.  I will call you with results.

## 2025-01-13 ENCOUNTER — TELEPHONE (OUTPATIENT)
Age: 80
End: 2025-01-13

## 2025-01-13 ENCOUNTER — NURSE TRIAGE (OUTPATIENT)
Age: 80
End: 2025-01-13

## 2025-01-13 NOTE — TELEPHONE ENCOUNTER
A user error has taken place: encounter opened in error, closed for administrative reasons    Please see telephone note from today 1/13/25 RE Farxiga- new [facility] card.

## 2025-01-13 NOTE — TELEPHONE ENCOUNTER
Call out to Patient to speak to her about below messages received as a triage call in regards to her Farxiga (please see call summary below):    Summary: medication problem    Script needs to go to a different pharmacy    The pt called stating she has a new [facility] Card for the new year. With the address of  [id number] [id number] phone number [phone] and her new member ID is [id number] she stated she has very little Farxiga left and would like a 90 day supply with refills. I did see under medications this was ordered on 1-6-25 and she asked who did that as she stated she didn't call for it and where it was sent was incorrect and to use the above information. If you have any questions for her please call her home phone number she said.              Reviewed that new script was sent per  on 1/6/25 to [facility]. Patient states she received new Rx card and is unsure if medication was sent to correct pharmacy- relayed that script was sent to [facility] as it has been previously. She stated she did not request that refill so she was unsure if this was correct and if [facility] had pts updated RX card info. Asked Patient to contact the above number on her [facility] card and clarify if they have updated info on file and that [facility] pharmacy is covered under new RX. Also asked Patient call [facility] and verify that they have updated RX card info on file. Patient relayed understanding and will call back with update.

## 2025-03-24 ENCOUNTER — APPOINTMENT (OUTPATIENT)
Dept: RADIOLOGY | Age: 80
End: 2025-03-24
Payer: MEDICARE

## 2025-03-24 ENCOUNTER — APPOINTMENT (OUTPATIENT)
Dept: LAB | Age: 80
End: 2025-03-24
Payer: MEDICARE

## 2025-03-24 DIAGNOSIS — J18.9 UNRESOLVED PNEUMONIA: ICD-10-CM

## 2025-03-24 DIAGNOSIS — E11.9 DIABETES MELLITUS WITHOUT COMPLICATION (HCC): ICD-10-CM

## 2025-03-24 DIAGNOSIS — R80.8 OTHER PROTEINURIA: ICD-10-CM

## 2025-03-24 LAB
ALBUMIN SERPL BCG-MCNC: 4.1 G/DL (ref 3.5–5)
ALP SERPL-CCNC: 62 U/L (ref 34–104)
ALT SERPL W P-5'-P-CCNC: 6 U/L (ref 7–52)
ANION GAP SERPL CALCULATED.3IONS-SCNC: 8 MMOL/L (ref 4–13)
AST SERPL W P-5'-P-CCNC: 14 U/L (ref 13–39)
BILIRUB SERPL-MCNC: 0.54 MG/DL (ref 0.2–1)
BUN SERPL-MCNC: 22 MG/DL (ref 5–25)
CALCIUM SERPL-MCNC: 10.1 MG/DL (ref 8.4–10.2)
CHLORIDE SERPL-SCNC: 103 MMOL/L (ref 96–108)
CO2 SERPL-SCNC: 27 MMOL/L (ref 21–32)
CREAT SERPL-MCNC: 0.67 MG/DL (ref 0.6–1.3)
CREAT UR-MCNC: 48.7 MG/DL
EST. AVERAGE GLUCOSE BLD GHB EST-MCNC: 146 MG/DL
GFR SERPL CREATININE-BSD FRML MDRD: 84 ML/MIN/1.73SQ M
GLUCOSE P FAST SERPL-MCNC: 111 MG/DL (ref 65–99)
HBA1C MFR BLD: 6.7 %
LDLC SERPL DIRECT ASSAY-MCNC: 46 MG/DL (ref 0–100)
POTASSIUM SERPL-SCNC: 4.7 MMOL/L (ref 3.5–5.3)
PROT SERPL-MCNC: 7 G/DL (ref 6.4–8.4)
PROT UR-MCNC: 34.1 MG/DL
PROT/CREAT UR: 0.7 MG/G{CREAT} (ref 0–0.1)
SODIUM SERPL-SCNC: 138 MMOL/L (ref 135–147)

## 2025-03-24 PROCEDURE — 80053 COMPREHEN METABOLIC PANEL: CPT

## 2025-03-24 PROCEDURE — 36415 COLL VENOUS BLD VENIPUNCTURE: CPT

## 2025-03-24 PROCEDURE — 83721 ASSAY OF BLOOD LIPOPROTEIN: CPT

## 2025-03-24 PROCEDURE — 84156 ASSAY OF PROTEIN URINE: CPT

## 2025-03-24 PROCEDURE — 82570 ASSAY OF URINE CREATININE: CPT

## 2025-03-24 PROCEDURE — 71046 X-RAY EXAM CHEST 2 VIEWS: CPT

## 2025-03-24 PROCEDURE — 83036 HEMOGLOBIN GLYCOSYLATED A1C: CPT

## 2025-03-28 ENCOUNTER — ESTABLISHED COMPREHENSIVE EXAM (OUTPATIENT)
Dept: URBAN - METROPOLITAN AREA CLINIC 6 | Facility: CLINIC | Age: 80
End: 2025-03-28

## 2025-03-28 DIAGNOSIS — I10: ICD-10-CM

## 2025-03-28 DIAGNOSIS — H35.363: ICD-10-CM

## 2025-03-28 DIAGNOSIS — Z79.4: ICD-10-CM

## 2025-03-28 DIAGNOSIS — E11.9: ICD-10-CM

## 2025-03-28 DIAGNOSIS — H52.03: ICD-10-CM

## 2025-03-28 DIAGNOSIS — H40.023: ICD-10-CM

## 2025-03-28 DIAGNOSIS — H40.053: ICD-10-CM

## 2025-03-28 DIAGNOSIS — H25.813: ICD-10-CM

## 2025-03-28 PROCEDURE — 92014 COMPRE OPH EXAM EST PT 1/>: CPT

## 2025-03-28 PROCEDURE — 92133 CPTRZD OPH DX IMG PST SGM ON: CPT

## 2025-03-28 PROCEDURE — 92083 EXTENDED VISUAL FIELD XM: CPT

## 2025-03-28 PROCEDURE — 92015 DETERMINE REFRACTIVE STATE: CPT

## 2025-03-28 ASSESSMENT — VISUAL ACUITY
OS_CC: 20/30-1
OD_CC: 20/40-2
OU_CC: J2
OD_PH: 20/30-1

## 2025-03-28 ASSESSMENT — TONOMETRY
OD_IOP_MMHG: 11
OS_IOP_MMHG: 13

## 2025-05-06 ENCOUNTER — TELEPHONE (OUTPATIENT)
Dept: ENDOCRINOLOGY | Facility: CLINIC | Age: 80
End: 2025-05-06

## 2025-06-03 ENCOUNTER — APPOINTMENT (EMERGENCY)
Dept: RADIOLOGY | Facility: HOSPITAL | Age: 80
DRG: 375 | End: 2025-06-03
Payer: MEDICARE

## 2025-06-03 ENCOUNTER — HOSPITAL ENCOUNTER (INPATIENT)
Facility: HOSPITAL | Age: 80
LOS: 4 days | Discharge: HOME | DRG: 375 | End: 2025-06-07
Attending: EMERGENCY MEDICINE | Admitting: INTERNAL MEDICINE
Payer: MEDICARE

## 2025-06-03 ENCOUNTER — TELEPHONE (OUTPATIENT)
Age: 80
End: 2025-06-03

## 2025-06-03 ENCOUNTER — LAB REQUISITION (OUTPATIENT)
Dept: LAB | Facility: HOSPITAL | Age: 80
DRG: 375 | End: 2025-06-03
Payer: MEDICARE

## 2025-06-03 DIAGNOSIS — E11.9 TYPE 2 DIABETES MELLITUS WITHOUT COMPLICATIONS (HCC): ICD-10-CM

## 2025-06-03 DIAGNOSIS — D64.9 SYMPTOMATIC ANEMIA: Primary | ICD-10-CM

## 2025-06-03 DIAGNOSIS — Z01.818 PRE-OPERATIVE CLEARANCE: ICD-10-CM

## 2025-06-03 DIAGNOSIS — E78.5 HYPERLIPIDEMIA, UNSPECIFIED: ICD-10-CM

## 2025-06-03 DIAGNOSIS — I10 ESSENTIAL (PRIMARY) HYPERTENSION: ICD-10-CM

## 2025-06-03 DIAGNOSIS — K63.89 MASS OF COLON: ICD-10-CM

## 2025-06-03 DIAGNOSIS — D64.9 ANEMIA, UNSPECIFIED: ICD-10-CM

## 2025-06-03 DIAGNOSIS — Z79.4 TYPE 2 DIABETES MELLITUS WITH HYPERGLYCEMIA, WITH LONG-TERM CURRENT USE OF INSULIN (HCC): ICD-10-CM

## 2025-06-03 DIAGNOSIS — E11.65 TYPE 2 DIABETES MELLITUS WITH HYPERGLYCEMIA, WITH LONG-TERM CURRENT USE OF INSULIN (HCC): ICD-10-CM

## 2025-06-03 PROBLEM — Z86.718 HISTORY OF DVT (DEEP VEIN THROMBOSIS): Status: ACTIVE | Noted: 2025-06-03

## 2025-06-03 LAB
2HR DELTA HS TROPONIN: 0 NG/L
4HR DELTA HS TROPONIN: 2 NG/L
ABO GROUP BLD: NORMAL
ABO GROUP BLD: NORMAL
ALBUMIN SERPL BCG-MCNC: 4 G/DL (ref 3.5–5)
ALP SERPL-CCNC: 62 U/L (ref 34–104)
ALT SERPL W P-5'-P-CCNC: 6 U/L (ref 7–52)
ANION GAP SERPL CALCULATED.3IONS-SCNC: 10 MMOL/L (ref 4–13)
ANION GAP SERPL CALCULATED.3IONS-SCNC: 8 MMOL/L (ref 4–13)
APTT PPP: 32 SECONDS (ref 23–34)
AST SERPL W P-5'-P-CCNC: 14 U/L (ref 13–39)
ATRIAL RATE: 80 BPM
BASOPHILS # BLD AUTO: 0.03 THOUSANDS/ÂΜL (ref 0–0.1)
BASOPHILS # BLD AUTO: 0.03 THOUSANDS/ÂΜL (ref 0–0.1)
BASOPHILS NFR BLD AUTO: 1 % (ref 0–1)
BASOPHILS NFR BLD AUTO: 1 % (ref 0–1)
BILIRUB SERPL-MCNC: 0.59 MG/DL (ref 0.2–1)
BLD GP AB SCN SERPL QL: NEGATIVE
BNP SERPL-MCNC: 163 PG/ML (ref 0–100)
BUN SERPL-MCNC: 20 MG/DL (ref 5–25)
BUN SERPL-MCNC: 22 MG/DL (ref 5–25)
CALCIUM SERPL-MCNC: 9.8 MG/DL (ref 8.4–10.2)
CALCIUM SERPL-MCNC: 9.9 MG/DL (ref 8.4–10.2)
CARDIAC TROPONIN I PNL SERPL HS: 4 NG/L (ref ?–50)
CARDIAC TROPONIN I PNL SERPL HS: 4 NG/L (ref ?–50)
CARDIAC TROPONIN I PNL SERPL HS: 6 NG/L (ref ?–50)
CHLORIDE SERPL-SCNC: 105 MMOL/L (ref 96–108)
CHLORIDE SERPL-SCNC: 105 MMOL/L (ref 96–108)
CO2 SERPL-SCNC: 23 MMOL/L (ref 21–32)
CO2 SERPL-SCNC: 26 MMOL/L (ref 21–32)
CREAT SERPL-MCNC: 0.72 MG/DL (ref 0.6–1.3)
CREAT SERPL-MCNC: 0.72 MG/DL (ref 0.6–1.3)
EOSINOPHIL # BLD AUTO: 0.07 THOUSAND/ÂΜL (ref 0–0.61)
EOSINOPHIL # BLD AUTO: 0.07 THOUSAND/ÂΜL (ref 0–0.61)
EOSINOPHIL NFR BLD AUTO: 1 % (ref 0–6)
EOSINOPHIL NFR BLD AUTO: 1 % (ref 0–6)
ERYTHROCYTE [DISTWIDTH] IN BLOOD BY AUTOMATED COUNT: 19.1 % (ref 11.6–15.1)
ERYTHROCYTE [DISTWIDTH] IN BLOOD BY AUTOMATED COUNT: 19.2 % (ref 11.6–15.1)
FERRITIN SERPL-MCNC: 1 NG/ML (ref 30–307)
GFR SERPL CREATININE-BSD FRML MDRD: 80 ML/MIN/1.73SQ M
GFR SERPL CREATININE-BSD FRML MDRD: 80 ML/MIN/1.73SQ M
GLUCOSE SERPL-MCNC: 118 MG/DL (ref 65–140)
GLUCOSE SERPL-MCNC: 134 MG/DL (ref 65–140)
HCT VFR BLD AUTO: 20.6 % (ref 34.8–46.1)
HCT VFR BLD AUTO: 21 % (ref 34.8–46.1)
HCT VFR BLD AUTO: 25.5 % (ref 34.8–46.1)
HGB BLD-MCNC: 5.3 G/DL (ref 11.5–15.4)
HGB BLD-MCNC: 5.5 G/DL (ref 11.5–15.4)
HGB BLD-MCNC: 7.3 G/DL (ref 11.5–15.4)
IMM GRANULOCYTES # BLD AUTO: 0.01 THOUSAND/UL (ref 0–0.2)
IMM GRANULOCYTES # BLD AUTO: 0.02 THOUSAND/UL (ref 0–0.2)
IMM GRANULOCYTES NFR BLD AUTO: 0 % (ref 0–2)
IMM GRANULOCYTES NFR BLD AUTO: 0 % (ref 0–2)
INR PPP: 1.39 (ref 0.85–1.19)
IRON SATN MFR SERPL: 2 % (ref 15–50)
IRON SERPL-MCNC: 11 UG/DL (ref 50–212)
LYMPHOCYTES # BLD AUTO: 1.46 THOUSANDS/ÂΜL (ref 0.6–4.47)
LYMPHOCYTES # BLD AUTO: 1.56 THOUSANDS/ÂΜL (ref 0.6–4.47)
LYMPHOCYTES NFR BLD AUTO: 27 % (ref 14–44)
LYMPHOCYTES NFR BLD AUTO: 27 % (ref 14–44)
MCH RBC QN AUTO: 16.5 PG (ref 26.8–34.3)
MCH RBC QN AUTO: 16.8 PG (ref 26.8–34.3)
MCHC RBC AUTO-ENTMCNC: 25.7 G/DL (ref 31.4–37.4)
MCHC RBC AUTO-ENTMCNC: 26.2 G/DL (ref 31.4–37.4)
MCV RBC AUTO: 64 FL (ref 82–98)
MCV RBC AUTO: 64 FL (ref 82–98)
MONOCYTES # BLD AUTO: 0.56 THOUSAND/ÂΜL (ref 0.17–1.22)
MONOCYTES # BLD AUTO: 0.64 THOUSAND/ÂΜL (ref 0.17–1.22)
MONOCYTES NFR BLD AUTO: 10 % (ref 4–12)
MONOCYTES NFR BLD AUTO: 11 % (ref 4–12)
NEUTROPHILS # BLD AUTO: 3.33 THOUSANDS/ÂΜL (ref 1.85–7.62)
NEUTROPHILS # BLD AUTO: 3.39 THOUSANDS/ÂΜL (ref 1.85–7.62)
NEUTS SEG NFR BLD AUTO: 60 % (ref 43–75)
NEUTS SEG NFR BLD AUTO: 61 % (ref 43–75)
NRBC BLD AUTO-RTO: 0 /100 WBCS
NRBC BLD AUTO-RTO: 0 /100 WBCS
P AXIS: 56 DEGREES
PLATELET # BLD AUTO: 325 THOUSANDS/UL (ref 149–390)
PLATELET # BLD AUTO: 344 THOUSANDS/UL (ref 149–390)
PMV BLD AUTO: 9.3 FL (ref 8.9–12.7)
PMV BLD AUTO: 9.5 FL (ref 8.9–12.7)
POTASSIUM SERPL-SCNC: 4.4 MMOL/L (ref 3.5–5.3)
POTASSIUM SERPL-SCNC: 5 MMOL/L (ref 3.5–5.3)
PR INTERVAL: 154 MS
PROT SERPL-MCNC: 6.9 G/DL (ref 6.4–8.4)
PROTHROMBIN TIME: 17.3 SECONDS (ref 12.3–15)
QRS AXIS: 20 DEGREES
QRSD INTERVAL: 74 MS
QT INTERVAL: 374 MS
QTC INTERVAL: 432 MS
RBC # BLD AUTO: 3.21 MILLION/UL (ref 3.81–5.12)
RBC # BLD AUTO: 3.27 MILLION/UL (ref 3.81–5.12)
RH BLD: POSITIVE
RH BLD: POSITIVE
SODIUM SERPL-SCNC: 138 MMOL/L (ref 135–147)
SODIUM SERPL-SCNC: 139 MMOL/L (ref 135–147)
SPECIMEN EXPIRATION DATE: NORMAL
T WAVE AXIS: 11 DEGREES
TIBC SERPL-MCNC: 445.2 UG/DL (ref 250–450)
TRANSFERRIN SERPL-MCNC: 318 MG/DL (ref 203–362)
TSH SERPL DL<=0.05 MIU/L-ACNC: 0.98 UIU/ML (ref 0.45–4.5)
UIBC SERPL-MCNC: 434 UG/DL (ref 155–355)
VENTRICULAR RATE: 80 BPM
WBC # BLD AUTO: 5.46 THOUSAND/UL (ref 4.31–10.16)
WBC # BLD AUTO: 5.71 THOUSAND/UL (ref 4.31–10.16)

## 2025-06-03 PROCEDURE — 83880 ASSAY OF NATRIURETIC PEPTIDE: CPT

## 2025-06-03 PROCEDURE — 71046 X-RAY EXAM CHEST 2 VIEWS: CPT

## 2025-06-03 PROCEDURE — 82728 ASSAY OF FERRITIN: CPT | Performed by: STUDENT IN AN ORGANIZED HEALTH CARE EDUCATION/TRAINING PROGRAM

## 2025-06-03 PROCEDURE — 80053 COMPREHEN METABOLIC PANEL: CPT | Performed by: INTERNAL MEDICINE

## 2025-06-03 PROCEDURE — 30233N1 TRANSFUSION OF NONAUTOLOGOUS RED BLOOD CELLS INTO PERIPHERAL VEIN, PERCUTANEOUS APPROACH: ICD-10-PCS | Performed by: INTERNAL MEDICINE

## 2025-06-03 PROCEDURE — 93005 ELECTROCARDIOGRAM TRACING: CPT

## 2025-06-03 PROCEDURE — 85018 HEMOGLOBIN: CPT | Performed by: INTERNAL MEDICINE

## 2025-06-03 PROCEDURE — 84484 ASSAY OF TROPONIN QUANT: CPT

## 2025-06-03 PROCEDURE — 85730 THROMBOPLASTIN TIME PARTIAL: CPT | Performed by: STUDENT IN AN ORGANIZED HEALTH CARE EDUCATION/TRAINING PROGRAM

## 2025-06-03 PROCEDURE — 36430 TRANSFUSION BLD/BLD COMPNT: CPT

## 2025-06-03 PROCEDURE — 99285 EMERGENCY DEPT VISIT HI MDM: CPT

## 2025-06-03 PROCEDURE — 93010 ELECTROCARDIOGRAM REPORT: CPT | Performed by: INTERNAL MEDICINE

## 2025-06-03 PROCEDURE — 99291 CRITICAL CARE FIRST HOUR: CPT | Performed by: EMERGENCY MEDICINE

## 2025-06-03 PROCEDURE — 36415 COLL VENOUS BLD VENIPUNCTURE: CPT

## 2025-06-03 PROCEDURE — 86901 BLOOD TYPING SEROLOGIC RH(D): CPT

## 2025-06-03 PROCEDURE — 83550 IRON BINDING TEST: CPT | Performed by: STUDENT IN AN ORGANIZED HEALTH CARE EDUCATION/TRAINING PROGRAM

## 2025-06-03 PROCEDURE — 84443 ASSAY THYROID STIM HORMONE: CPT | Performed by: STUDENT IN AN ORGANIZED HEALTH CARE EDUCATION/TRAINING PROGRAM

## 2025-06-03 PROCEDURE — 86923 COMPATIBILITY TEST ELECTRIC: CPT

## 2025-06-03 PROCEDURE — 80048 BASIC METABOLIC PNL TOTAL CA: CPT

## 2025-06-03 PROCEDURE — 85025 COMPLETE CBC W/AUTO DIFF WBC: CPT

## 2025-06-03 PROCEDURE — 85014 HEMATOCRIT: CPT | Performed by: INTERNAL MEDICINE

## 2025-06-03 PROCEDURE — 86900 BLOOD TYPING SEROLOGIC ABO: CPT

## 2025-06-03 PROCEDURE — 85025 COMPLETE CBC W/AUTO DIFF WBC: CPT | Performed by: INTERNAL MEDICINE

## 2025-06-03 PROCEDURE — 86850 RBC ANTIBODY SCREEN: CPT

## 2025-06-03 PROCEDURE — 83540 ASSAY OF IRON: CPT | Performed by: STUDENT IN AN ORGANIZED HEALTH CARE EDUCATION/TRAINING PROGRAM

## 2025-06-03 PROCEDURE — 99223 1ST HOSP IP/OBS HIGH 75: CPT | Performed by: INTERNAL MEDICINE

## 2025-06-03 PROCEDURE — 85610 PROTHROMBIN TIME: CPT | Performed by: STUDENT IN AN ORGANIZED HEALTH CARE EDUCATION/TRAINING PROGRAM

## 2025-06-03 PROCEDURE — P9016 RBC LEUKOCYTES REDUCED: HCPCS

## 2025-06-03 RX ORDER — ATORVASTATIN CALCIUM 20 MG/1
20 TABLET, FILM COATED ORAL
Status: DISCONTINUED | OUTPATIENT
Start: 2025-06-03 | End: 2025-06-07 | Stop reason: HOSPADM

## 2025-06-03 RX ORDER — PROPRANOLOL HCL 20 MG
20 TABLET ORAL EVERY 12 HOURS SCHEDULED
Status: DISCONTINUED | OUTPATIENT
Start: 2025-06-03 | End: 2025-06-07 | Stop reason: HOSPADM

## 2025-06-03 RX ORDER — LOSARTAN POTASSIUM 50 MG/1
100 TABLET ORAL DAILY
Status: DISCONTINUED | OUTPATIENT
Start: 2025-06-04 | End: 2025-06-07 | Stop reason: HOSPADM

## 2025-06-03 RX ORDER — INSULIN GLARGINE 100 [IU]/ML
18 INJECTION, SOLUTION SUBCUTANEOUS EVERY MORNING
Status: DISCONTINUED | OUTPATIENT
Start: 2025-06-04 | End: 2025-06-07 | Stop reason: HOSPADM

## 2025-06-03 RX ORDER — ACETAMINOPHEN 325 MG/1
650 TABLET ORAL EVERY 6 HOURS PRN
Status: DISCONTINUED | OUTPATIENT
Start: 2025-06-03 | End: 2025-06-07 | Stop reason: HOSPADM

## 2025-06-03 RX ORDER — INSULIN LISPRO 100 [IU]/ML
1-5 INJECTION, SOLUTION INTRAVENOUS; SUBCUTANEOUS
Status: DISCONTINUED | OUTPATIENT
Start: 2025-06-04 | End: 2025-06-07 | Stop reason: HOSPADM

## 2025-06-03 RX ORDER — LATANOPROST 50 UG/ML
1 SOLUTION/ DROPS OPHTHALMIC
Status: DISCONTINUED | OUTPATIENT
Start: 2025-06-03 | End: 2025-06-07 | Stop reason: HOSPADM

## 2025-06-03 RX ADMIN — ATORVASTATIN CALCIUM 20 MG: 20 TABLET, FILM COATED ORAL at 19:42

## 2025-06-03 RX ADMIN — LATANOPROST 1 DROP: 50 SOLUTION OPHTHALMIC at 21:36

## 2025-06-03 RX ADMIN — PROPRANOLOL HYDROCHLORIDE 20 MG: 20 TABLET ORAL at 21:27

## 2025-06-03 NOTE — TELEPHONE ENCOUNTER
"Called patient and informed her that her granddaughter Rebecca is not on the patients communication sheet and I could not talk to her.   I was speaking with patient and it was not the Jardiance, as she has not been on that since January of 2025. The correct medication would be the Farxiga.   I asked patient why she is no longer seeing endo, she states at her last office visit (9/18/24) they told her she is \"discharged\" and no longer needs to be seen. Looking at the AVS for the patient she was to return in 3 months. She states she doesn't know why it says that and she no longer sees them. Patient states she spoke with Rebecca and she wants her to go see her endocrinologist at [facility], she has an appointment on 7/10 with [facility].    I advised she call endo for [facility] as they are the ones who are managing this medication.     She states she will and she is also seeing her PCP later today so she stated she will also bring this up at that visit.   "

## 2025-06-03 NOTE — ASSESSMENT & PLAN NOTE
History of DVT years ago, per son probably 10 years ago.  Unknown if it was provoked or unprovoked, currently on Eliquis  Hold Eliquis

## 2025-06-03 NOTE — ED PROVIDER NOTES
Time reflects when diagnosis was documented in both MDM as applicable and the Disposition within this note       Time User Action Codes Description Comment    6/3/2025  5:48 PM Raquel Rivera Add [D64.9] Symptomatic anemia           ED Disposition       ED Disposition   Admit    Condition   Stable    Date/Time   Tue Demetrio 3, 2025  5:53 PM    Comment   Case was discussed with Tomas and the patient's admission status was agreed to be Admission Status: inpatient status to the service of Dr. Aguilar .               Assessment & Plan       Medical Decision Making  Patient is a 79-year-old female who presents today for abnormal outpatient labs with a hemoglobin of 5.5.  Vital signs: Hemodynamically stable     Pertinent physical exam: Conjunctival pallor, normal rate and rhythm, lungs clear to auscultation bilaterally, abdomen soft nontender nondistended.     Differential diagnosis and plan:   Differential diagnosis includes but is not limited to acute blood loss anemia, upper GI bleed, lower GI bleed, traumatic injury.  Plan to obtain CBC to evaluate for anemia, BMP to evaluate for electrolyte abnormality and renal function.  Will obtain troponin to evaluate for cardiac etiology.  Will obtain EKG.  Will obtain chest x-ray.     View ED course above for further discussion on patient workup.      All labs reviewed and utilized in the medical decision making process  All radiology studies independently viewed by me and interpreted by the radiologist.  I reviewed all testing with the patient.      ED course:  Workup in the emergency department revealed hemoglobin of 5.3, MCV 64 consistent with microcytic anemia.  No electrolyte abnormalities and normal renal function.  Patient was consented to receive 2 units of PRBCs.  I discussed the results with the patient and or 2 sons in the room.  Patient was Hemoccult positive.  Patient is agreeable to be admitted to the hospital for further evaluation of her anemia.  All  "questions were answered prior to admission.    Disposition: Admission to [facility] for evaluation of symptomatic anemia.     Portions of the record may have been created with voice recognition software. Occasional wrong word or \"sound a like\" substitutions may have occurred due to the inherent limitations of voice recognition software. Read the chart carefully and recognize, using context, where substitutions have occurred.    Amount and/or Complexity of Data Reviewed  Labs: ordered. Decision-making details documented in ED Course.  Radiology: ordered and independent interpretation performed.    Risk  Decision regarding hospitalization.        ED Course as of 06/03/25 1824   Tue Jun 03, 2025   1725 Sodium: 138   1725 Potassium: 4.4   1725 Creatinine: 0.72   1728 hs TnI 0hr: 4   1731 Hemoglobin(!!): 5.3   1731 MCV(!): 64  Microcytic anemia.   1747 Hemoccult positive.       Medications - No data to display    ED Risk Strat Scores                    No data recorded        SBIRT 20yo+      Flowsheet Row Most Recent Value   Initial Alcohol Screen: US AUDIT-C     1. How often do you have a drink containing alcohol? 0 Filed at: 06/03/2025 1634   2. How many drinks containing alcohol do you have on a typical day you are drinking?  0 Filed at: 06/03/2025 1634   3b. FEMALE Any Age, or MALE 65+: How often do you have 4 or more drinks on one occassion? 0 Filed at: 06/03/2025 1634   Audit-C Score 0 Filed at: 06/03/2025 1634   CALEB: How many times in the past year have you...    Used an illegal drug or used a prescription medication for non-medical reasons? Never Filed at: 06/03/2025 1634                            History of Present Illness       Chief Complaint   Patient presents with    Abnormal Lab     Pt had blood work was done today and found to have hgb was low at 5.5. Pt reports feeling \"sluggish\" +SOB       Past Medical History[1]   Past Surgical History[2]   Family History[3]   Social History[4]   E-Cigarette/Vaping    " E-Cigarette Use Never User       E-Cigarette/Vaping Substances    Nicotine No     THC No     CBD No     Flavoring No     Other No     Unknown No       I have reviewed and agree with the history as documented.     Patient is a 79-year-old female with past medical history of type 2 diabetes, hypertension, hyperlipidemia and tremor who presents today for evaluation of abnormal outpatient labs revealing hemoglobin of 5.5.  Patient was seen and evaluated by her primary care physician for worsening lower extremity edema and fatigue and had blood work this afternoon that revealed a hemoglobin of 5.5.  She states for the past couple weeks she has been feeling more fatigue.  She also endorses shortness of breath when she walks up the stairs.  She denies any chest pain or shortness of breath at rest.  She denies any dark stools or blood in her stools.  She denies blood in her urine.  She denies any recent falls or traumatic injury.  She does take Eliquis and her last dose was this morning.  She denies history of anemia.          Review of Systems   Constitutional:  Positive for fatigue. Negative for chills and fever.   HENT:  Negative for congestion, rhinorrhea and sore throat.    Respiratory:  Positive for shortness of breath.    Cardiovascular:  Negative for chest pain.   Gastrointestinal:  Negative for abdominal pain, blood in stool, diarrhea, nausea and vomiting.   Genitourinary:  Negative for dysuria, hematuria and urgency.   Musculoskeletal:  Negative for back pain.   Neurological:  Negative for dizziness, light-headedness and headaches.           Objective       ED Triage Vitals [06/03/25 1514]   Temperature Pulse Blood Pressure Respirations SpO2 Patient Position - Orthostatic VS   97.7 °F (36.5 °C) 82 165/77 18 98 % --      Temp src Heart Rate Source BP Location FiO2 (%) Pain Score    -- -- -- -- No Pain      Vitals      Date and Time Temp Pulse SpO2 Resp BP Pain Score FACES Pain Rating User   06/03/25 1815 -- 79 98 %  16 168/72 -- -- JT   06/03/25 1812 98.9 °F (37.2 °C) 76 97 % 17 162/70 -- -- JT   06/03/25 1800 -- 76 97 % 14 158/71 -- -- JT   06/03/25 1700 -- 75 98 % 19 145/65 -- -- JT   06/03/25 1514 97.7 °F (36.5 °C) 82 98 % 18 165/77 No Pain -- KIY            Physical Exam  Vitals and nursing note reviewed.   Constitutional:       General: She is not in acute distress.     Appearance: Normal appearance. She is well-developed. She is not ill-appearing.   HENT:      Head: Normocephalic and atraumatic.      Nose: Nose normal.      Mouth/Throat:      Mouth: Mucous membranes are moist.     Eyes:      Conjunctiva/sclera: Conjunctivae normal.      Comments: Conjunctiva pallor     Cardiovascular:      Rate and Rhythm: Normal rate and regular rhythm.      Heart sounds: Normal heart sounds. No murmur heard.  Pulmonary:      Effort: Pulmonary effort is normal. No respiratory distress.      Breath sounds: Normal breath sounds.   Abdominal:      General: Abdomen is flat. There is no distension.      Palpations: Abdomen is soft.      Tenderness: There is no abdominal tenderness.     Musculoskeletal:      Right lower leg: Edema present.      Left lower leg: Edema present.     Skin:     General: Skin is warm and dry.      Capillary Refill: Capillary refill takes less than 2 seconds.     Neurological:      General: No focal deficit present.      Mental Status: She is alert and oriented to person, place, and time.     Psychiatric:         Mood and Affect: Mood normal.         Behavior: Behavior normal.         Results Reviewed       Procedure Component Value Units Date/Time    HS Troponin I 2hr [[id number]] Collected: 06/03/25 1810    Lab Status: In process Specimen: Blood from Arm, Right Updated: 06/03/25 1821    TSH, 3rd generation [[id number]] Collected: 06/03/25 1649    Lab Status: In process Specimen: Blood from Arm, Right Updated: 06/03/25 1814    TIBC Panel (incl. Iron, TIBC, % Iron Saturation) [[id number]] Collected: 06/03/25 1649    Lab  Status: In process Specimen: Blood from Arm, Right Updated: 06/03/25 1807    Ferritin [[id number]] Collected: 06/03/25 1649    Lab Status: In process Specimen: Blood from Arm, Right Updated: 06/03/25 1807    CBC and differential [[id number]]  (Abnormal) Collected: 06/03/25 1649    Lab Status: Final result Specimen: Blood from Arm, Right Updated: 06/03/25 1740     WBC 5.71 Thousand/uL      RBC 3.21 Million/uL      Hemoglobin 5.3 g/dL      Hematocrit 20.6 %      MCV 64 fL      MCH 16.5 pg      MCHC 25.7 g/dL      RDW 19.2 %      MPV 9.3 fL      Platelets 325 Thousands/uL      nRBC 0 /100 WBCs      Segmented % 60 %      Immature Grans % 0 %      Lymphocytes % 27 %      Monocytes % 11 %      Eosinophils Relative 1 %      Basophils Relative 1 %      Absolute Neutrophils 3.39 Thousands/µL      Absolute Immature Grans 0.02 Thousand/uL      Absolute Lymphocytes 1.56 Thousands/µL      Absolute Monocytes 0.64 Thousand/µL      Eosinophils Absolute 0.07 Thousand/µL      Basophils Absolute 0.03 Thousands/µL     Narrative:      This is an appended report.  These results have been appended to a previously verified report.    HS Troponin 0hr (reflex protocol) [[id number]]  (Normal) Collected: 06/03/25 1649    Lab Status: Final result Specimen: Blood from Arm, Right Updated: 06/03/25 1727     hs TnI 0hr 4 ng/L     Basic metabolic panel [[id number]] Collected: 06/03/25 1649    Lab Status: Final result Specimen: Blood from Arm, Right Updated: 06/03/25 1724     Sodium 138 mmol/L      Potassium 4.4 mmol/L      Chloride 105 mmol/L      CO2 23 mmol/L      ANION GAP 10 mmol/L      BUN 22 mg/dL      Creatinine 0.72 mg/dL      Glucose 134 mg/dL      Calcium 9.8 mg/dL      eGFR 80 ml/min/1.73sq m     Narrative:      National Kid[employer] Disease Foundation guidelines for Chronic Kidney Disease (CKD):     Stage 1 with normal or high GFR (GFR > 90 mL/min/1.73 square meters)    Stage 2 Mild CKD (GFR = 60-89 mL/min/1.73 square meters)    Stage 3A Moderate  CKD (GFR = 45-59 mL/min/1.73 square meters)    Stage 3B Moderate CKD (GFR = 30-44 mL/min/1.73 square meters)    Stage 4 Severe CKD (GFR = 15-29 mL/min/1.73 square meters)    Stage 5 End Stage CKD (GFR <15 mL/min/1.73 square meters)  Note: GFR calculation is accurate only with a steady state creatinine    B-Type Natriuretic Peptide(BNP) [[id number]] Collected: 25 1649    Lab Status: In process Specimen: Blood from Arm, Right Updated: 25            XR chest 2 views   ED Interpretation by Raquel Rivera DO ([id number])   No acute cardiopulmonary disease.          ECG 12 Lead Documentation Only    Date/Time: 6/3/2025 4:21 PM    Performed by: Raquel Rivera DO  Authorized by: Raquel Rivera DO    Indications / Diagnosis:  Exertional shortness of breath  ECG reviewed by me, the ED Provider: yes    Patient location:  ED  Previous ECG:     Previous ECG:  Unavailable  Interpretation:     Interpretation: normal    Rate:     ECG rate:  80    ECG rate assessment: normal    Rhythm:     Rhythm: sinus rhythm    Ectopy:     Ectopy: none    QRS:     QRS axis:  Normal    QRS intervals:  Normal  Conduction:     Conduction: normal    ST segments:     ST segments:  Normal  T waves:     T waves: normal        ED Medication and Procedure Management   Prior to Admission Medications   Prescriptions Last Dose Informant Patient Reported? Taking?   Blood Glucose Monitoring Suppl (ONETOUCH VERIO) w/Device KIT  Self No No   Sig: Use to check BG  DX E11.9, patient will check blood sugars 3 times daily   Cholecalciferol (Vitamin D3) 50 MCG ( UT) capsule  Self No No   Si units twice per day   Farxiga 5 MG TABS   No No   Sig: TAKE 1 TABLET BY MOUTH DAILY   Insulin Glargine Solostar (Basaglar KwikPen) 100 UNIT/ML SOPN   No No   Sig: DIAL AND INJECT 25 UNITS UNDER THE SKIN EVERY MORNING. MAX DAILY DOSE IS 25 UNITS.   Insulin Pen Needle (BD Pen Needle Carol Ann U/F) 32G X 4 MM MISC  Self No No   Sig: Use daily    ONETOUCH DELICA LANCETS 30G MISC  Self Yes No   Sig: TEST BLOOD SUGARS THREE TIMES DAILY   OneTouch Verio test strip  Self No No   Sig: CHECK BLOOD GLUCOSE THREE TIMES DAILY   Trulicity 0.75 MG/0.5ML injection  Self No No   Sig: INJECT 0.75MG (0.5ML) UNDER THE SKIN ONCE A WEEK.   apixaban (ELIQUIS) 5 mg  Self Yes No   Sig: Take 5 mg by mouth 2 (two) times a day   atorvastatin (LIPITOR) 20 mg tablet  Self Yes No   Sig: Take by mouth daily at bedtime    glimepiride (AMARYL) 2 mg tablet   No No   Sig: TAKE 1 TABLET BY MOUTH TWICE  DAILY   latanoprost (XALATAN) 0.005 % ophthalmic solution  Self Yes No   Sig: Administer 1 drop to both eyes daily at bedtime   losartan (COZAAR) 100 MG tablet  Self Yes No   Sig: Take 100 mg by mouth daily   metFORMIN (GLUCOPHAGE) 1000 MG tablet   No No   Sig: TAKE 1 TABLET BY MOUTH TWICE  DAILY WITH MEALS   propranolol (INDERAL) 20 mg tablet  Self Yes No   Sig: Take 20 mg by mouth Two tablets a day      Facility-Administered Medications: None     Patient's Medications   Discharge Prescriptions    No medications on file     No discharge procedures on file.  ED SEPSIS DOCUMENTATION   Time reflects when diagnosis was documented in both MDM as applicable and the Disposition within this note       Time User Action Codes Description Comment    6/3/2025  5:48 PM Raquel Rivera Add [D64.9] Symptomatic anemia                      [1]   Past Medical History:  Diagnosis Date    Diabetes (HCC)     Diabetes mellitus (HCC)     Disease of thyroid gland     DVT, bilateral lower limbs (HCC)     Hyperlipidemia     Hypertension     Hypertension     Kidney stones     Renal disorder    [2]   Past Surgical History:  Procedure Laterality Date    HYSTERECTOMY      partial   [3]   Family History  Problem Relation Name Age of Onset    Cancer Family      No Known Problems Mother      No Known Problems Father     [4]   Social History  Tobacco Use    Smoking status: Never    Smokeless tobacco: Never   Vaping Use     Vaping status: Never Used   Substance Use Topics    Alcohol use: No    Drug use: No        Raquel Rivera DO  06/03/25 1823

## 2025-06-03 NOTE — H&P
"H&P - Hospitalist   Name: Leanne Jiang 79 y.o. female I MRN: [id number]  Unit/Bed#: ED 05 I Date of Admission: 6/3/2025   Date of Service: 6/3/2025 I Hospital Day: 0     Assessment & Plan  Symptomatic anemia  Patient is a 79-year-old female with a past medical history of hypertension, insulin-dependent diabetes, proteinuria, hyperlipidemia, tremor who presented to the hospital with abnormal labs which was ordered by PCP, hemoglobin of 5.1.  Has generalized weakness, poor oral intake, exertional dyspnea, pallor for the past few months.  Denies nausea vomiting abdominal pain  Denies melena, blood in the stool, hematuria.  Fairly regular bowel movements  Never had EGD or colonoscopy in the past  On Eliquis fpr Hx of DVT  Hemoglobin in the ED 5.3. WBC and platelets wnl  Hemoccult positive  Ordered 2 units of blood  Check iron panel, add on to ED labs  Order CT chest abdomen pelvis w contrast to rule out malignancy  Check H&H 1 hours after the second unit  N.p.o. after midnight  Hold Eliquis  GI consult  History of DVT (deep vein thrombosis)  History of DVT years ago, per son probably 10 years ago.  Unknown if it was provoked or unprovoked, currently on Eliquis  Hold Eliquis  Hyperlipidemia  Continue Lipitor 20 mg daily  Hypertension  Continue losartan 100 mg daily with holding parameter SBP<130  Tremor  Continue propranolol  Type 2 diabetes mellitus with diabetic microalbuminuria, with long-term current use of insulin ([facility])  Lab Results   Component Value Date    HGBA1C 6.7 (H) 03/24/2025       No results for input(s): \"POCGLU\" in the last 72 hours.    Blood Sugar Average: Last 72 hrs:  Home regimen is metformin, Trulicity, Basaglar 18 units a.m.  She was started in December on Farxiga due to proteinuria, she has not been feeling well, thought that it was secondary to Farxiga and it was stopped recently by PCP  Will make her n.p.o. after midnight, start insulin sliding scale    Vitamin D deficiency  Continue vitamin " D  Proteinuria  Follows nephrology      VTE Pharmacologic Prophylaxis:   Moderate Risk (Score 3-4) - Pharmacological DVT Prophylaxis Contraindicated. Sequential Compression Devices Ordered.  Code Status: Level 1 - Full Code   Discussion with family: Updated  (son) at bedside.    Anticipated Length of Stay: Patient will be admitted on an inpatient basis with an anticipated length of stay of greater than 2 midnights secondary to symptomatic anemia.    History of Present Illness   Chief Complaint: Abnormal labs    Leanne Jiang is a 79 y.o. female with a PMH of hypertension, hyperlipidemia, insulin-dependent diabetes, proteinuria, tremor who presents with abnormal labs.  Information gathered from patient and son at bedside.  For the past few months she had generalized weakness, poor oral intake, exertional dyspnea.  She has been under a lot of stress lately, her son moved in with her, her son had a stroke, is now  in stage V kidney disease and he is supposed to start dialysis.  In December she started Farxiga, she was also diagnosed with upper respiratory tract infection, bronchitis and was on multiple courses of antibiotics.  She thought that she is not feeling well because of Farxiga and stopped taking the medication a while ago.  She denies any chest pain palpitation.  Has exertional dyspnea.  Generalized weakness.  Denies melena, blood in the stool.  Has fairly normal bowel movements.  Occasionally constipated and she takes a stool softener.  Never had EGD or colonoscopy.  She was seen by her PCP who ordered labs and found to have hemoglobin of 5.1 and sent to the ED for further evaluation.  She also noted some mild lower extremity swelling.    Review of Systems   Constitutional:  Positive for activity change, appetite change and fatigue.   Respiratory:          Exertional dyspnea   Cardiovascular:  Negative for chest pain.   Gastrointestinal:  Negative for abdominal pain, anal bleeding, blood in  stool, constipation, diarrhea, nausea and vomiting.   Genitourinary:  Negative for dysuria and hematuria.   Musculoskeletal: Negative.    Skin:  Positive for pallor.   Neurological:  Negative for dizziness.   Hematological: Negative.    Psychiatric/Behavioral: Negative.         Historical Information   Past Medical History[1]  Past Surgical History[2]  Social History[3]  E-Cigarette/Vaping    E-Cigarette Use Never User      E-Cigarette/Vaping Substances    Nicotine No     THC No     CBD No     Flavoring No     Other No     Unknown No      Family history non-contributory  Social History:  Marital Status: /Civil Union       Meds/Allergies   I have reviewed home medications with patient personally.  Prior to Admission medications    Medication Sig Start Date End Date Taking? Authorizing Provider   apixaban (ELIQUIS) 5 mg Take 5 mg by mouth 2 (two) times a day    Historical Provider, MD   atorvastatin (LIPITOR) 20 mg tablet Take by mouth daily at bedtime     Historical Provider, MD   Blood Glucose Monitoring Suppl (ONETOUCH VERIO) w/Device KIT Use to check BG  DX E11.9, patient will check blood sugars 3 times daily 2/10/20   Desiree Perez PA-C   Cholecalciferol (Vitamin D3) 50 MCG (2000 UT) capsule 2000 units twice per day 1/13/21   Desiree Perez PA-C   Farxiga 5 MG TABS TAKE 1 TABLET BY MOUTH DAILY 1/6/25   Emma Shaikh MD   glimepiride (AMARYL) 2 mg tablet TAKE 1 TABLET BY MOUTH TWICE  DAILY 9/20/24   Desiree Perez PA-C   Insulin Glargine Solostar (Basaglar KwikPen) 100 UNIT/ML SOPN DIAL AND INJECT 25 UNITS UNDER THE SKIN EVERY MORNING. MAX DAILY DOSE IS 25 UNITS. 9/18/24   Emma Shaikh MD   Insulin Pen Needle (BD Pen Needle Carol Ann U/F) 32G X 4 MM MISC Use daily 12/8/21   Desiree Perez PA-C   latanoprost (XALATAN) 0.005 % ophthalmic solution Administer 1 drop to both eyes daily at bedtime 11/26/19   Historical Provider, MD   losartan (COZAAR) 100 MG tablet Take 100 mg by mouth daily 1/16/24    Historical Provider, MD   metFORMIN (GLUCOPHAGE) 1000 MG tablet TAKE 1 TABLET BY MOUTH TWICE  DAILY WITH MEALS 11/21/24   MD SPIKE Martinez DELICA LANCETS 30G MISC TEST BLOOD SUGARS THREE TIMES DAILY 11/6/14   Historical Provider, MD   OneTouch Verio test strip CHECK BLOOD GLUCOSE THREE TIMES DAILY 3/11/24   Desiree Perez PA-C   propranolol (INDERAL) 20 mg tablet Take 20 mg by mouth Two tablets a day    Historical Provider, MD   Trulicity 0.75 MG/0.5ML injection INJECT 0.75MG (0.5ML) UNDER THE SKIN ONCE A WEEK. 12/27/23   Desiree Perez PA-C     Allergies   Allergen Reactions    Sulfa Antibiotics Rash       Objective :  Temp:  [97.7 °F (36.5 °C)-99 °F (37.2 °C)] 99 °F (37.2 °C)  HR:  [75-82] 78  BP: (145-168)/(65-77) 162/68  Resp:  [14-19] 15  SpO2:  [97 %-98 %] 98 %  O2 Device: None (Room air)    Physical Exam  Constitutional:       General: She is not in acute distress.  HENT:      Head: Atraumatic.     Cardiovascular:      Rate and Rhythm: Normal rate and regular rhythm.      Heart sounds: No murmur heard.  Pulmonary:      Effort: Pulmonary effort is normal. No respiratory distress.      Breath sounds: Normal breath sounds. No wheezing.   Abdominal:      General: There is no distension.      Palpations: Abdomen is soft.      Tenderness: There is no abdominal tenderness.     Skin:     Coloration: Skin is pale.     Neurological:      General: No focal deficit present.      Mental Status: She is alert and oriented to person, place, and time.     Psychiatric:         Mood and Affect: Mood normal.          Lines/Drains:            Lab Results: I have reviewed the following results:  Results from last 7 days   Lab Units 06/03/25  1649   WBC Thousand/uL 5.71   HEMOGLOBIN g/dL 5.3*   HEMATOCRIT % 20.6*   PLATELETS Thousands/uL 325   SEGS PCT % 60   LYMPHO PCT % 27   MONO PCT % 11   EOS PCT % 1     Results from last 7 days   Lab Units 06/03/25  1649 06/03/25  1330   SODIUM mmol/L 138 139   POTASSIUM  mmol/L 4.4 5.0   CHLORIDE mmol/L 105 105   CO2 mmol/L 23 26   BUN mg/dL 22 20   CREATININE mg/dL 0.72 0.72   ANION GAP mmol/L 10 8   CALCIUM mg/dL 9.8 9.9   ALBUMIN g/dL  --  4.0   TOTAL BILIRUBIN mg/dL  --  0.59   ALK PHOS U/L  --  62   ALT U/L  --  6*   AST U/L  --  14   GLUCOSE RANDOM mg/dL 134 118             Lab Results   Component Value Date    HGBA1C 6.7 (H) 03/24/2025    HGBA1C 7.0 (H) 08/26/2024    HGBA1C 7.3 (H) 02/22/2024           Imaging Results Review: I reviewed radiology reports from this admission including: chest xray.  Other Study Results Review: EKG was reviewed.     Administrative Statements       ** Please Note: This note has been constructed using a voice recognition system. **         [1]   Past Medical History:  Diagnosis Date    Diabetes (HCC)     Diabetes mellitus (HCC)     Disease of thyroid gland     DVT, bilateral lower limbs (HCC)     Hyperlipidemia     Hypertension     Hypertension     Kidney stones     Renal disorder    [2]   Past Surgical History:  Procedure Laterality Date    HYSTERECTOMY      partial   [3]   Social History  Tobacco Use    Smoking status: Never    Smokeless tobacco: Never   Vaping Use    Vaping status: Never Used   Substance and Sexual Activity    Alcohol use: No    Drug use: No    Sexual activity: Not Currently

## 2025-06-03 NOTE — ASSESSMENT & PLAN NOTE
Patient is a 79-year-old female with a past medical history of hypertension, insulin-dependent diabetes, proteinuria, hyperlipidemia, tremor who presented to the hospital with abnormal labs which was ordered by PCP, hemoglobin of 5.1.  Has generalized weakness, poor oral intake, exertional dyspnea, pallor for the past few months.  Denies nausea vomiting abdominal pain  Denies melena, blood in the stool, hematuria.  Fairly regular bowel movements  Never had EGD or colonoscopy in the past  On Eliquis fpr Hx of DVT  Hemoglobin in the ED 5.3. WBC and platelets wnl  Hemoccult positive  Ordered 2 units of blood  Check iron panel, add on to ED labs  Order CT chest abdomen pelvis w contrast to rule out malignancy  Check H&H 1 hours after the second unit  N.p.o. after midnight  Hold Eliquis  GI consult

## 2025-06-03 NOTE — ASSESSMENT & PLAN NOTE
"Lab Results   Component Value Date    HGBA1C 6.7 (H) 03/24/2025       No results for input(s): \"POCGLU\" in the last 72 hours.    Blood Sugar Average: Last 72 hrs:  Home regimen is metformin, Trulicity, Basaglar 18 units a.m.  She was started in December on Farxiga due to proteinuria, she has not been feeling well, thought that it was secondary to Farxiga and it was stopped recently by PCP  Will make her n.p.o. after midnight, start insulin sliding scale    "

## 2025-06-03 NOTE — Clinical Note
Case was discussed with Tomas and the patient's admission status was agreed to be Admission Status: inpatient status to the service of Dr. Calle .

## 2025-06-03 NOTE — TELEPHONE ENCOUNTER
Patient grandchild Rebecca calling to ask us if patient should continue with the jardiance? She no longer sees endocrinology and Altagracia would like to be instructed by a provider before she discontinues. Rebecca stated that since starting this medication she is lethargic, increase in her anxiety, she said she is very jaundice, and has no appetite. She said she visits her PCP, but they don't seem concerned. She is asking if the medication should be stopped and if blood work should be done? Please advise.

## 2025-06-04 ENCOUNTER — APPOINTMENT (INPATIENT)
Dept: RADIOLOGY | Facility: HOSPITAL | Age: 80
DRG: 375 | End: 2025-06-04
Payer: MEDICARE

## 2025-06-04 PROBLEM — Z80.0 FAMILY HISTORY OF GASTRIC CANCER: Status: ACTIVE | Noted: 2025-06-04

## 2025-06-04 LAB
ABO GROUP BLD BPU: NORMAL
ABO GROUP BLD BPU: NORMAL
ANION GAP SERPL CALCULATED.3IONS-SCNC: 8 MMOL/L (ref 4–13)
BPU ID: NORMAL
BPU ID: NORMAL
BUN SERPL-MCNC: 18 MG/DL (ref 5–25)
CALCIUM SERPL-MCNC: 9.3 MG/DL (ref 8.4–10.2)
CHLORIDE SERPL-SCNC: 105 MMOL/L (ref 96–108)
CO2 SERPL-SCNC: 23 MMOL/L (ref 21–32)
CREAT SERPL-MCNC: 0.55 MG/DL (ref 0.6–1.3)
CROSSMATCH: NORMAL
CROSSMATCH: NORMAL
ERYTHROCYTE [DISTWIDTH] IN BLOOD BY AUTOMATED COUNT: 24.3 % (ref 11.6–15.1)
GFR SERPL CREATININE-BSD FRML MDRD: 90 ML/MIN/1.73SQ M
GLUCOSE SERPL-MCNC: 110 MG/DL (ref 65–140)
GLUCOSE SERPL-MCNC: 119 MG/DL (ref 65–140)
GLUCOSE SERPL-MCNC: 120 MG/DL (ref 65–140)
GLUCOSE SERPL-MCNC: 121 MG/DL (ref 65–140)
GLUCOSE SERPL-MCNC: 125 MG/DL (ref 65–140)
HCT VFR BLD AUTO: 26.8 % (ref 34.8–46.1)
HGB BLD-MCNC: 7.4 G/DL (ref 11.5–15.4)
MCH RBC QN AUTO: 19.3 PG (ref 26.8–34.3)
MCHC RBC AUTO-ENTMCNC: 27.6 G/DL (ref 31.4–37.4)
MCV RBC AUTO: 70 FL (ref 82–98)
PLATELET # BLD AUTO: 283 THOUSANDS/UL (ref 149–390)
PMV BLD AUTO: 9.4 FL (ref 8.9–12.7)
POTASSIUM SERPL-SCNC: 3.8 MMOL/L (ref 3.5–5.3)
RBC # BLD AUTO: 3.83 MILLION/UL (ref 3.81–5.12)
SODIUM SERPL-SCNC: 136 MMOL/L (ref 135–147)
UNIT DISPENSE STATUS: NORMAL
UNIT DISPENSE STATUS: NORMAL
UNIT PRODUCT CODE: NORMAL
UNIT PRODUCT CODE: NORMAL
UNIT PRODUCT VOLUME: 250 ML
UNIT PRODUCT VOLUME: 350 ML
UNIT RH: NORMAL
UNIT RH: NORMAL
WBC # BLD AUTO: 4.86 THOUSAND/UL (ref 4.31–10.16)

## 2025-06-04 PROCEDURE — 99232 SBSQ HOSP IP/OBS MODERATE 35: CPT | Performed by: PHYSICIAN ASSISTANT

## 2025-06-04 PROCEDURE — 71260 CT THORAX DX C+: CPT

## 2025-06-04 PROCEDURE — 85027 COMPLETE CBC AUTOMATED: CPT | Performed by: INTERNAL MEDICINE

## 2025-06-04 PROCEDURE — 80048 BASIC METABOLIC PNL TOTAL CA: CPT | Performed by: INTERNAL MEDICINE

## 2025-06-04 PROCEDURE — 82948 REAGENT STRIP/BLOOD GLUCOSE: CPT

## 2025-06-04 PROCEDURE — 99222 1ST HOSP IP/OBS MODERATE 55: CPT | Performed by: INTERNAL MEDICINE

## 2025-06-04 PROCEDURE — 74177 CT ABD & PELVIS W/CONTRAST: CPT

## 2025-06-04 RX ORDER — POLYETHYLENE GLYCOL 3350 17 G/17G
238 POWDER, FOR SOLUTION ORAL ONCE
Status: COMPLETED | OUTPATIENT
Start: 2025-06-04 | End: 2025-06-04

## 2025-06-04 RX ADMIN — PROPRANOLOL HYDROCHLORIDE 20 MG: 20 TABLET ORAL at 08:42

## 2025-06-04 RX ADMIN — Medication 1000 UNITS: at 08:42

## 2025-06-04 RX ADMIN — POLYETHYLENE GLYCOL 3350 238 G: 17 POWDER, FOR SOLUTION ORAL at 20:04

## 2025-06-04 RX ADMIN — SODIUM CHLORIDE 200 MG: 9 INJECTION, SOLUTION INTRAVENOUS at 17:12

## 2025-06-04 RX ADMIN — PROPRANOLOL HYDROCHLORIDE 20 MG: 20 TABLET ORAL at 21:08

## 2025-06-04 RX ADMIN — ATORVASTATIN CALCIUM 20 MG: 20 TABLET, FILM COATED ORAL at 17:12

## 2025-06-04 RX ADMIN — IOHEXOL 75 ML: 350 INJECTION, SOLUTION INTRAVENOUS at 07:37

## 2025-06-04 RX ADMIN — LOSARTAN POTASSIUM 100 MG: 50 TABLET, FILM COATED ORAL at 08:42

## 2025-06-04 RX ADMIN — ACETAMINOPHEN 650 MG: 325 TABLET ORAL at 04:51

## 2025-06-04 RX ADMIN — LATANOPROST 1 DROP: 50 SOLUTION OPHTHALMIC at 21:08

## 2025-06-04 NOTE — PLAN OF CARE
Problem: Potential for Falls  Goal: Patient will remain free of falls  Description: INTERVENTIONS:  - Educate patient/family on patient safety including physical limitations  - Instruct patient to call for assistance with activity   - Consider consulting OT/PT to assist with strengthening/mobility based on AM PAC & JH-HLM score  - Consult OT/PT to assist with strengthening/mobility   - Keep Call bell within reach  - Keep bed low and locked with side rails adjusted as appropriate  - Keep care items and personal belongings within reach  - Initiate and maintain comfort rounds  - Make Fall Risk Sign visible to staff  :- Consider moving patient to room near nurses station  Outcome: Progressing

## 2025-06-04 NOTE — ASSESSMENT & PLAN NOTE
Lab Results   Component Value Date    HGBA1C 6.7 (H) 03/24/2025       Recent Labs     06/04/25  0754 06/04/25  1130   POCGLU 119 125       Blood Sugar Average: Last 72 hrs:  (P) 122Home regimen is metformin, Trulicity, Basaglar 18 units a.m.  She was started in December on Farxiga due to proteinuria, she has not been feeling well, thought that it was secondary to Farxiga and it was stopped recently by PCP  Will make her n.p.o. after midnight, start insulin sliding scale

## 2025-06-04 NOTE — PROGRESS NOTES
Progress Note - Hospitalist   Name: Leanne Jiang 79 y.o. female I MRN: [id number]  Unit/Bed#: -01 I Date of Admission: 6/3/2025   Date of Service: 6/4/2025 I Hospital Day: 1    Assessment & Plan  Symptomatic anemia  Has had increasing fatigue, generalized weakness, exertional dyspnea, pallor.  PCP ordered routine labs, found to be severely anemic with Hgb 5.3 so referred to ED for admission/work up.  Denies melena, blood in the stool, hematuria, abd pain; denies overt bleeding.  Fairly regular bowel movements  Never had EGD or colonoscopy in the past.  Her mother had gastric cancer.    On Eliquis for history of DVT -- currently held   S/p 2 units PRBCs, Hgb improved from 5.3 to 7.4  Iron severely low -- ordered IV iron   CT chest abdomen pelvis w contrast to rule out malignancy with no acute findings   GI consulted, plan for EGD/colonoscopy on 6/5.  NPO @ [facility]   History of DVT (deep vein thrombosis)  History of DVT years ago, per son probably 10 years ago.  Unknown if it was provoked or unprovoked, currently on Eliquis  Hold Eliquis in setting of above.  Unclear if truly needs this   Hyperlipidemia  Continue Lipitor 20 mg daily  Hypertension  Continue losartan 100 mg daily with holding parameter SBP<130  Tremor  Continue propranolol  Type 2 diabetes mellitus with diabetic microalbuminuria, with long-term current use of insulin ([facility])  Lab Results   Component Value Date    HGBA1C 6.7 (H) 03/24/2025       Recent Labs     06/04/25  0754 06/04/25  1130   POCGLU 119 125       Blood Sugar Average: Last 72 hrs:  (P) 122Home regimen is metformin, Trulicity, Basaglar 18 units a.m.  She was started in December on Farxiga due to proteinuria, she has not been feeling well, thought that it was secondary to Farxiga and it was stopped recently by PCP  Will make her n.p.o. after midnight, start insulin sliding scale  Family history of gastric cancer  Mother with hx of   Vitamin D deficiency  Continue vitamin  D  Proteinuria  Follows nephrology    VTE Pharmacologic Prophylaxis:   Moderate Risk (Score 3-4) - Pharmacological DVT Prophylaxis Ordered: apixaban (Eliquis).    Mobility:   Basic Mobility Inpatient Raw Score: 24  JH-HLM Goal: 8: Walk 250 feet or more  JH-HLM Achieved: 8: Walk 250 feet ot more  JH-HLM Goal achieved. Continue to encourage appropriate mobility.    Patient Centered Rounds: I performed bedside rounds with nursing staff today.   Discussions with Specialists or Other Care Team Provider:     Education and Discussions with Family / Patient: Updated  (son) via phone.    Current Length of Stay: 1 day(s)  Current Patient Status: Inpatient   Certification Statement: The patient will continue to require additional inpatient hospital stay due to anemia, planning EGD/colon for eval tomorrow  Discharge Plan: Anticipate discharge in 48-72 hrs to home.    Code Status: Level 1 - Full Code    Subjective   Feeling much better today following transfusion.  Anxious regarding bowel prep.     Objective :  Temp:  [97.5 °F (36.4 °C)-99 °F (37.2 °C)] 98 °F (36.7 °C)  HR:  [74-82] 75  BP: (140-182)/() 140/68  Resp:  [14-19] 16  SpO2:  [97 %-99 %] 98 %  O2 Device: None (Room air)    Body mass index is 25.28 kg/m².     Input and Output Summary (last 24 hours):     Intake/Output Summary (Last 24 hours) at 6/4/2025 1436  Last data filed at 6/4/2025 1100  Gross per 24 hour   Intake 1440 ml   Output --   Net 1440 ml       Physical Exam  Vitals reviewed.   Constitutional:       General: She is not in acute distress.     Appearance: She is not toxic-appearing.   HENT:      Head: Normocephalic and atraumatic.     Eyes:      Extraocular Movements: Extraocular movements intact.       Cardiovascular:      Rate and Rhythm: Normal rate and regular rhythm.   Pulmonary:      Effort: Pulmonary effort is normal. No respiratory distress.   Abdominal:      Palpations: Abdomen is soft.     Musculoskeletal:         General:  Normal range of motion.     Neurological:      General: No focal deficit present.      Mental Status: She is alert and oriented to person, place, and time.     Psychiatric:         Mood and Affect: Mood normal.         Behavior: Behavior normal.       Lines/Drains:          Lab Results: I have reviewed the following results:   Results from last 7 days   Lab Units 06/04/25 0446 06/03/25  2135 06/03/25  1649   WBC Thousand/uL 4.86  --  5.71   HEMOGLOBIN g/dL 7.4*   < > 5.3*   HEMATOCRIT % 26.8*   < > 20.6*   PLATELETS Thousands/uL 283  --  325   SEGS PCT %  --   --  60   LYMPHO PCT %  --   --  27   MONO PCT %  --   --  11   EOS PCT %  --   --  1    < > = values in this interval not displayed.     Results from last 7 days   Lab Units 06/04/25  0446 06/03/25  1649 06/03/25  1330   SODIUM mmol/L 136   < > 139   POTASSIUM mmol/L 3.8   < > 5.0   CHLORIDE mmol/L 105   < > 105   CO2 mmol/L 23   < > 26   BUN mg/dL 18   < > 20   CREATININE mg/dL 0.55*   < > 0.72   ANION GAP mmol/L 8   < > 8   CALCIUM mg/dL 9.3   < > 9.9   ALBUMIN g/dL  --   --  4.0   TOTAL BILIRUBIN mg/dL  --   --  0.59   ALK PHOS U/L  --   --  62   ALT U/L  --   --  6*   AST U/L  --   --  14   GLUCOSE RANDOM mg/dL 110   < > 118    < > = values in this interval not displayed.     Results from last 7 days   Lab Units 06/03/25  1917   INR  1.39*     Results from last 7 days   Lab Units 06/04/25  1130 06/04/25  0754   POC GLUCOSE mg/dl 125 119               Recent Cultures (last 7 days):         Imaging Results Review: No pertinent imaging studies reviewed.  Other Study Results Review: No additional pertinent studies reviewed.    Last 24 Hours Medication List:     Current Facility-Administered Medications:     acetaminophen (TYLENOL) tablet 650 mg, Q6H PRN    [Held by provider] apixaban (ELIQUIS) tablet 5 mg, BID    atorvastatin (LIPITOR) tablet 20 mg, Daily With Dinner    Cholecalciferol (VITAMIN D3) tablet 1,000 Units, Daily    [Held by provider] insulin  glargine (LANTUS) subcutaneous injection 18 Units 0.18 mL, QAM    insulin lispro (HumALOG/ADMELOG) 100 units/mL subcutaneous injection 1-5 Units, TID AC **AND** Fingerstick Glucose (POCT), TID AC    iron sucrose (VENOFER) 200 mg in sodium chloride 0.9 % 100 mL IVPB, Daily    latanoprost (XALATAN) 0.005 % ophthalmic solution 1 drop, HS    losartan (COZAAR) tablet 100 mg, Daily    polyethylene glycol (GLYCOLAX) bowel prep 238 g, Once    propranolol (INDERAL) tablet 20 mg, Q12H GENARO    Administrative Statements   Today, Patient Was Seen By: Adrianna Valdovinos PA-C      **Please Note: This note may have been constructed using a voice recognition system.**

## 2025-06-04 NOTE — CONSULTS
Consultation - Gastroenterology   Name: Leanne Jiang 79 y.o. female I MRN: [id number]  Unit/Bed#: -01 I Date of Admission: 6/3/2025   Date of Service: 6/4/2025 I Hospital Day: 1       Inpatient consult to gastroenterology     Date/Time  6/4/2025 7:26 AM     Performed by  Je Lovett DO   Authorized by  Gloria Kuhn MD           Physician Requesting Evaluation: Francisco Alvarez, *   Reason for Evaluation / Principal Problem: Anemia    Assessment & Plan  Symptomatic anemia  Family history of gastric cancer  79-year-old female with a history of DM2 on insulin, HTN, HLD, DVT in 2017 on Eliquis who presents with abnormal outpatient labs showing anemia with hemoglobin 5.5    Vitals stable on arrival arrival. CBC shows microcytic anemia with hgb 5.5 and MCV 64. Last hgb from 2017 was normal at 12.5. Other cell lines normal. CMP with normal BUN and liver enzymes. Iron panel with ferritin 1 c/w iron deficiency anemia. Rectal exam per ED resident reported brown stool. No prior EGD or colonoscopy per chart review. She received 2u PRBC and hemoglobin now remains stable at 7.4.     Of note, she has a history of DVT in 2017 for which she has been maintained on Eliquis. Last dose of Eliquis 6/3. Per chart review, it is questionable whether this was provoked or unprovoked. Given this, it will be important to establish the absolute necessity of anticoagulation going forward if she is high risk for bleeding.     Plan:  Plan for bidirectional endoscopy tomorrow  Clear liquid diet today, bowel preparation starting this afternoon, NPO at midnight  Monitor for overt bleeding  Monitor hemoglobin and hemodynamics  Continue to hold Eliquis; recommend ongoing discussion regarding the absolute necessity to continue AC  GI will follow  History of DVT (deep vein thrombosis)  Continue to hold Eliquis for now    I have discussed the above management plan in detail with the primary service.     History of Present Illness    HPI:  Leanne Jiang is a 79-year-old female with a history of DM2 on insulin, HTN, HLD, DVT in 2017 on Eliquis who presents with abnormal outpatient labs showing anemia with hemoglobin 5.5    Vitals stable on arrival arrival. CBC shows microcytic anemia with hgb 5.5 and MCV 64. Last hgb from 2017 was normal at 12.5. Other cell lines normal. CMP with normal BUN and liver enzymes. Iron panel with ferritin 1 c/w iron deficiency anemia. Rectal exam per ED resident reported brown stool. No prior EGD or colonoscopy per chart review. She received 1u PRBC in the ER.     Patient seen and evaluated at bedside.  The only symptom she reports is worsening fatigue.  She denies any GI complaints including overt bleeding, nausea, vomiting, reflux symptoms, abdominal pain.  She reports a family history of gastric cancer in her mother diagnosed before the age of 50.  Denies family history of colon cancer.  She takes Eliquis with her last dose being yesterday.  She denies NSAID use.    Review of Systems Negative other than stated above    Objective :  Temp:  [97.5 °F (36.4 °C)-99 °F (37.2 °C)] 97.5 °F (36.4 °C)  HR:  [74-82] 77  BP: (145-182)/() 156/66  Resp:  [14-19] 16  SpO2:  [97 %-98 %] 97 %  O2 Device: None (Room air)    Physical Exam  General: No apparent distress, resting comfortably   Head: Normocephalic, atraumatic  Eyes: Anicteric, no conjunctival erythema  ENT: External ear normal, no nasal discharge  Neck: Trachea midline, no visible lymphadenopathy   Respiratory:  Non-labored respirations, symmetric thorax expansion  Cardiovascular:  Extremities appear well-perfused  Abdomen: Non-distended   Extremities: Moves extremities spontaneously  Skin: No visible rashes or jaundice  Neuro: No gross focal deficits, no aphasia     Je Lovett D.O.  Gastroenterology Fellow, PGY-4  [facility]

## 2025-06-04 NOTE — PLAN OF CARE
Problem: Potential for Falls  Goal: Patient will remain free of falls  Description: INTERVENTIONS:  - Educate patient/family on patient safety including physical limitations  - Instruct patient to call for assistance with activity   - Consider consulting OT/PT to assist with strengthening/mobility based on AM PAC & JH-HLM score  - Consult OT/PT to assist with strengthening/mobility   - Keep Call bell within reach  - Keep bed low and locked with side rails adjusted as appropriate  - Keep care items and personal belongings within reach  - Initiate and maintain comfort rounds  - Make Fall Risk Sign visible to staff  - Offer Toileting every  Hours, in advance of need  - Initiate/Maintain alarm  - Obtain necessary fall risk management equipment:   Problem: DISCHARGE PLANNING  Goal: Discharge to home or other facility with appropriate resources  Description: INTERVENTIONS:  - Identify barriers to discharge w/patient and caregiver  - Arrange for needed discharge resources and transportation as appropriate  - Identify discharge learning needs (meds, wound care, etc.)  - Arrange for interpretive services to assist at discharge as needed  - Refer to Case Management Department for coordinating discharge planning if the patient needs post-hospital services based on physician/advanced practitioner order or complex needs related to functional status, cognitive ability, or social support system  Outcome: Progressing     Problem: Knowledge Deficit  Goal: Patient/family/caregiver demonstrates understanding of disease process, treatment plan, medications, and discharge instructions  Description: Complete learning assessment and assess knowledge base.  Interventions:  - Provide teaching at level of understanding  - Provide teaching via preferred learning methods  Outcome: Progressing     Problem: HEMATOLOGIC - ADULT  Goal: Maintains hematologic stability  Description: INTERVENTIONS  - Assess for signs and symptoms of bleeding or  hemorrhage  - Monitor labs  - Administer supportive blood products/factors as ordered and appropriate  Outcome: Progressing     - Apply yellow socks and bracelet for high fall risk patients  - Consider moving patient to room near nurses station  Outcome: Progressing

## 2025-06-04 NOTE — ASSESSMENT & PLAN NOTE
History of DVT years ago, per son probably 10 years ago.  Unknown if it was provoked or unprovoked, currently on Eliquis  Hold Eliquis in setting of above.  Unclear if truly needs this

## 2025-06-04 NOTE — ASSESSMENT & PLAN NOTE
79-year-old female with a history of DM2 on insulin, HTN, HLD, DVT in 2017 on Eliquis who presents with abnormal outpatient labs showing anemia with hemoglobin 5.5    Vitals stable on arrival arrival. CBC shows microcytic anemia with hgb 5.5 and MCV 64. Last hgb from 2017 was normal at 12.5. Other cell lines normal. CMP with normal BUN and liver enzymes. Iron panel with ferritin 1 c/w iron deficiency anemia. Rectal exam per ED resident reported brown stool. No prior EGD or colonoscopy per chart review. She received 2u PRBC and hemoglobin now remains stable at 7.4.     Of note, she has a history of DVT in 2017 for which she has been maintained on Eliquis. Last dose of Eliquis 6/3. Per chart review, it is questionable whether this was provoked or unprovoked. Given this, it will be important to establish the absolute necessity of anticoagulation going forward if she is high risk for bleeding.     Plan:  Plan for bidirectional endoscopy tomorrow  Clear liquid diet today, bowel preparation starting this afternoon, NPO at midnight  Monitor for overt bleeding  Monitor hemoglobin and hemodynamics  Continue to hold Eliquis; recommend ongoing discussion regarding the absolute necessity to continue AC  GI will follow

## 2025-06-04 NOTE — ASSESSMENT & PLAN NOTE
Has had increasing fatigue, generalized weakness, exertional dyspnea, pallor.  PCP ordered routine labs, found to be severely anemic with Hgb 5.3 so referred to ED for admission/work up.  Denies melena, blood in the stool, hematuria, abd pain; denies overt bleeding.  Fairly regular bowel movements  Never had EGD or colonoscopy in the past.  Her mother had gastric cancer.    On Eliquis for history of DVT -- currently held   S/p 2 units PRBCs, Hgb improved from 5.3 to 7.4  Iron severely low -- ordered IV iron   CT chest abdomen pelvis w contrast to rule out malignancy with no acute findings   GI consulted, plan for EGD/colonoscopy on 6/5.  NPO @ [facility]

## 2025-06-04 NOTE — APP STUDENT NOTE
[facility] STUDENT  Inpatient Progress Note for TRAINING ONLY  Not Part of Legal Medical Record     Progress Note - Leanne Jiang 79 y.o. female MRN: [id number]  Unit/Bed#: -01 Encounter: [id number]    Assessment:  Principal Problem:    Symptomatic anemia  Active Problems:    Hyperlipidemia    Hypertension    Tremor    Type 2 diabetes mellitus with diabetic microalbuminuria, with long-term current use of insulin (HCC)    Vitamin D deficiency    Proteinuria    History of DVT (deep vein thrombosis)    Family history of gastric cancer      Plan:  Continue holding Eliquis   Bidirectional endoscopy scheduled tomorrow  as per GI  Clear liquid diet, NPO at midnight  Continue PO iron  Monitor hemoglobin and hematocrit     VTE Pharmacologic Prophylaxis:   Pharmacologic: {VTE Prophylaxis Meds:98569}  Mechanical VTE Prophylaxis in Place: {Yes or No:38210}    Patient Centered Rounds: {Patient Centered Rounds:43192}    Discussions with Specialists or Other Care Team Provider: ***    Education and Discussions with Family / Patient: ***    Time Spent for Care: {Time; Time 15 min - 1 hour:03091}.  More than 50% of total time spent on counseling and coordination of care as described above.    Current Length of Stay: 1 day(s)    Current Patient Status: Inpatient   Certification Statement: {Certification Statement:73100}    Discharge Plan: ***    Code Status: Level 1 - Full Code    Subjective:   Pt sitting in chair, reports significant improvement in SOB. Last BM 6/3, denies blood in stool or urine.     Objective:     Vitals:   Temp (24hrs), Av.3 °F (36.8 °C), Min:97.5 °F (36.4 °C), Max:99 °F (37.2 °C)    Temp:  [97.5 °F (36.4 °C)-99 °F (37.2 °C)] 98 °F (36.7 °C)  HR:  [74-82] 75  Resp:  [14-19] 16  BP: (140-182)/() 140/68  SpO2:  [97 %-99 %] 98 %  Body mass index is 25.28 kg/m².     Input and Output Summary (last 24 hours):       Intake/Output Summary (Last 24 hours) at 2025 1355  Last data filed at 2025  1100  Gross per 24 hour   Intake 1440 ml   Output --   Net 1440 ml       Physical Exam:     Physical Exam  Constitutional:       General: She is not in acute distress.     Appearance: Normal appearance. She is normal weight.   HENT:      Head: Normocephalic and atraumatic.     Cardiovascular:      Rate and Rhythm: Normal rate and regular rhythm.      Pulses: Normal pulses.      Heart sounds: No murmur heard.     No gallop.   Pulmonary:      Effort: Pulmonary effort is normal. No respiratory distress.      Breath sounds: Normal breath sounds.   Abdominal:      General: There is no distension.      Palpations: Abdomen is soft.      Tenderness: There is no abdominal tenderness. There is no guarding or rebound.     Skin:     General: Skin is warm and dry.      Capillary Refill: Capillary refill takes less than 2 seconds.      Coloration: Skin is pale.      Findings: No bruising.     Neurological:      Mental Status: She is alert.      Motor: No weakness.       Historical Information   Past Medical History[1]  Past Surgical History[2]  Social History   Social History     Substance and Sexual Activity   Alcohol Use Never     Social History     Substance and Sexual Activity   Drug Use No     Tobacco Use History[3]  Family History: {[facility] FAM HISTORY SMARTLIST:035136915}    Meds/Allergies   {[facility] H&P MEDS:275340436}  Allergies[4]    Additional Data:     Labs:    Results from last 7 days   Lab Units 06/04/25  0446 06/03/25  2135 06/03/25  1649   WBC Thousand/uL 4.86  --  5.71   HEMOGLOBIN g/dL 7.4*   < > 5.3*   HEMATOCRIT % 26.8*   < > 20.6*   PLATELETS Thousands/uL 283  --  325   SEGS PCT %  --   --  60   LYMPHO PCT %  --   --  27   MONO PCT %  --   --  11   EOS PCT %  --   --  1    < > = values in this interval not displayed.     Results from last 7 days   Lab Units 06/04/25  0446 06/03/25  1649 06/03/25  1330   SODIUM mmol/L 136   < > 139   POTASSIUM mmol/L 3.8   < > 5.0   CHLORIDE mmol/L 105   < > 105   CO2 mmol/L 23    < > 26   BUN mg/dL 18   < > 20   CREATININE mg/dL 0.55*   < > 0.72   ANION GAP mmol/L 8   < > 8   CALCIUM mg/dL 9.3   < > 9.9   ALBUMIN g/dL  --   --  4.0   TOTAL BILIRUBIN mg/dL  --   --  0.59   ALK PHOS U/L  --   --  62   ALT U/L  --   --  6*   AST U/L  --   --  14   GLUCOSE RANDOM mg/dL 110   < > 118    < > = values in this interval not displayed.     Results from last 7 days   Lab Units 06/03/25  1917   INR  1.39*     Results from last 7 days   Lab Units 06/04/25  1130 06/04/25  0754   POC GLUCOSE mg/dl 125 119                 * I Have Reviewed All Lab Data Listed Above.  * Additional Pertinent Lab Tests Reviewed: {Labreview:64733}    Imaging:    Imaging Reports Reviewed Today Include: ***  Imaging Personally Reviewed by Myself Includes:  ***    Recent Cultures (last 7 days):           Last 24 Hours Medication List:   Current Facility-Administered Medications   Medication Dose Route Frequency Provider Last Rate    acetaminophen  650 mg Oral Q6H PRN Myriam Aguilar MD      [Held by provider] apixaban  5 mg Oral BID Myriam Aguilar MD      atorvastatin  20 mg Oral Daily With Dinner Myriam Aguilar MD      cholecalciferol  1,000 Units Oral Daily Myriam Aguilar MD      [Held by provider] insulin glargine  18 Units Subcutaneous QAM Myriam Aguilar MD      insulin lispro  1-5 Units Subcutaneous TID AC Myriam Aguilar MD      latanoprost  1 drop Both Eyes HS Myriam Aguilar MD      losartan  100 mg Oral Daily Myriam Aguilar MD      polyethylene glycol  238 g Oral Once Je Lovett, DO      propranolol  20 mg Oral Q12H GENARO Myriam Aguilar MD          Today, Patient Was Seen By: Sheela Siddiqui    ** Please Note: Dictation voice to text software may have been used in the creation of this document. **       [1]   Past Medical History:  Diagnosis Date    Diabetes (HCC)     Diabetes mellitus (HCC)     Disease of thyroid gland     DVT, bilateral lower limbs (HCC)     Hyperlipidemia      Hypertension     Hypertension     Kidney stones     Renal disorder    [2]   Past Surgical History:  Procedure Laterality Date    HYSTERECTOMY      partial   [3]   Social History  Tobacco Use   Smoking Status Never    Passive exposure: Never   Smokeless Tobacco Never   [4]   Allergies  Allergen Reactions    Sulfa Antibiotics Rash

## 2025-06-05 PROBLEM — E61.1 IRON DEFICIENCY: Status: ACTIVE | Noted: 2025-06-05

## 2025-06-05 LAB
ANION GAP SERPL CALCULATED.3IONS-SCNC: 9 MMOL/L (ref 4–13)
BASOPHILS # BLD AUTO: 0.04 THOUSANDS/ÂΜL (ref 0–0.1)
BASOPHILS NFR BLD AUTO: 1 % (ref 0–1)
BUN SERPL-MCNC: 11 MG/DL (ref 5–25)
CALCIUM SERPL-MCNC: 10.4 MG/DL (ref 8.4–10.2)
CHLORIDE SERPL-SCNC: 103 MMOL/L (ref 96–108)
CO2 SERPL-SCNC: 24 MMOL/L (ref 21–32)
CREAT SERPL-MCNC: 0.61 MG/DL (ref 0.6–1.3)
EOSINOPHIL # BLD AUTO: 0.1 THOUSAND/ÂΜL (ref 0–0.61)
EOSINOPHIL NFR BLD AUTO: 2 % (ref 0–6)
ERYTHROCYTE [DISTWIDTH] IN BLOOD BY AUTOMATED COUNT: 24.5 % (ref 11.6–15.1)
GFR SERPL CREATININE-BSD FRML MDRD: 87 ML/MIN/1.73SQ M
GLUCOSE SERPL-MCNC: 109 MG/DL (ref 65–140)
GLUCOSE SERPL-MCNC: 120 MG/DL (ref 65–140)
GLUCOSE SERPL-MCNC: 129 MG/DL (ref 65–140)
GLUCOSE SERPL-MCNC: 133 MG/DL (ref 65–140)
GLUCOSE SERPL-MCNC: 135 MG/DL (ref 65–140)
HCT VFR BLD AUTO: 28.7 % (ref 34.8–46.1)
HGB BLD-MCNC: 8.3 G/DL (ref 11.5–15.4)
IMM GRANULOCYTES # BLD AUTO: 0.03 THOUSAND/UL (ref 0–0.2)
IMM GRANULOCYTES NFR BLD AUTO: 1 % (ref 0–2)
LYMPHOCYTES # BLD AUTO: 1.33 THOUSANDS/ÂΜL (ref 0.6–4.47)
LYMPHOCYTES NFR BLD AUTO: 23 % (ref 14–44)
MCH RBC QN AUTO: 19.6 PG (ref 26.8–34.3)
MCHC RBC AUTO-ENTMCNC: 28.9 G/DL (ref 31.4–37.4)
MCV RBC AUTO: 68 FL (ref 82–98)
MONOCYTES # BLD AUTO: 0.75 THOUSAND/ÂΜL (ref 0.17–1.22)
MONOCYTES NFR BLD AUTO: 13 % (ref 4–12)
NEUTROPHILS # BLD AUTO: 3.45 THOUSANDS/ÂΜL (ref 1.85–7.62)
NEUTS SEG NFR BLD AUTO: 60 % (ref 43–75)
NRBC BLD AUTO-RTO: 0 /100 WBCS
PLATELET # BLD AUTO: 325 THOUSANDS/UL (ref 149–390)
PMV BLD AUTO: 9.1 FL (ref 8.9–12.7)
POTASSIUM SERPL-SCNC: 4 MMOL/L (ref 3.5–5.3)
RBC # BLD AUTO: 4.24 MILLION/UL (ref 3.81–5.12)
SODIUM SERPL-SCNC: 136 MMOL/L (ref 135–147)
VIT B12 SERPL-MCNC: 211 PG/ML (ref 180–914)
WBC # BLD AUTO: 5.7 THOUSAND/UL (ref 4.31–10.16)

## 2025-06-05 PROCEDURE — 82948 REAGENT STRIP/BLOOD GLUCOSE: CPT

## 2025-06-05 PROCEDURE — 85025 COMPLETE CBC W/AUTO DIFF WBC: CPT | Performed by: PHYSICIAN ASSISTANT

## 2025-06-05 PROCEDURE — 82607 VITAMIN B-12: CPT | Performed by: PHYSICIAN ASSISTANT

## 2025-06-05 PROCEDURE — 80048 BASIC METABOLIC PNL TOTAL CA: CPT | Performed by: PHYSICIAN ASSISTANT

## 2025-06-05 PROCEDURE — 99232 SBSQ HOSP IP/OBS MODERATE 35: CPT | Performed by: PHYSICIAN ASSISTANT

## 2025-06-05 RX ORDER — POLYETHYLENE GLYCOL 3350 17 G/17G
238 POWDER, FOR SOLUTION ORAL ONCE
Status: COMPLETED | OUTPATIENT
Start: 2025-06-05 | End: 2025-06-05

## 2025-06-05 RX ADMIN — POLYETHYLENE GLYCOL 3350 238 G: 17 POWDER, FOR SOLUTION ORAL at 17:29

## 2025-06-05 RX ADMIN — CYANOCOBALAMIN TAB 500 MCG 1000 MCG: 500 TAB at 12:12

## 2025-06-05 RX ADMIN — Medication 1000 UNITS: at 08:37

## 2025-06-05 RX ADMIN — PROPRANOLOL HYDROCHLORIDE 20 MG: 20 TABLET ORAL at 08:37

## 2025-06-05 RX ADMIN — LATANOPROST 1 DROP: 50 SOLUTION OPHTHALMIC at 21:18

## 2025-06-05 RX ADMIN — PROPRANOLOL HYDROCHLORIDE 20 MG: 20 TABLET ORAL at 21:18

## 2025-06-05 RX ADMIN — LOSARTAN POTASSIUM 100 MG: 50 TABLET, FILM COATED ORAL at 08:37

## 2025-06-05 RX ADMIN — SODIUM CHLORIDE 200 MG: 9 INJECTION, SOLUTION INTRAVENOUS at 08:37

## 2025-06-05 RX ADMIN — ATORVASTATIN CALCIUM 20 MG: 20 TABLET, FILM COATED ORAL at 17:29

## 2025-06-05 NOTE — PLAN OF CARE
Problem: Potential for Falls  Goal: Patient will remain free of falls  Description: INTERVENTIONS:  - Educate patient/family on patient safety including physical limitations  - Instruct patient to call for assistance with activity   - Consider consulting OT/PT to assist with strengthening/mobility based on AM PAC & JH-HLM score  - Consult OT/PT to assist with strengthening/mobility   - Keep Call bell within reach  - Keep bed low and locked with side rails adjusted as appropriate  - Keep care items and personal belongings within reach  - Initiate and maintain comfort rounds  - Make Fall Risk Sign visible to staff    Problem: HEMATOLOGIC - ADULT  Goal: Maintains hematologic stability  Description: INTERVENTIONS  - Assess for signs and symptoms of bleeding or hemorrhage  - Monitor labs  - Administer supportive blood products/factors as ordered and appropriate  Outcome: Progressing   - Apply yellow socks and bracelet for high fall risk patients  - Consider moving patient to room near nurses station  Outcome: Progressing

## 2025-06-05 NOTE — ASSESSMENT & PLAN NOTE
History of DVT years ago, per son probably 10 years ago.  Unknown if it was provoked or unprovoked, currently on Eliquis  Hold Eliquis in setting of above.  Unclear if truly needs this.

## 2025-06-05 NOTE — ASSESSMENT & PLAN NOTE
Has had increasing fatigue, generalized weakness, exertional dyspnea, pallor.  PCP ordered routine labs, found to be severely anemic with Hgb 5.3 so referred to ED for admission/work up.  Denies melena, blood in the stool, hematuria, abd pain; denies overt bleeding.  Fairly regular bowel movements  Never had EGD or colonoscopy.  Her mother had gastric cancer.    On Eliquis for history of DVT -- currently held   S/p 2 units PRBCs, Hgb improved from 5.3 to 7.4.  Labs 6/5 Hgb 8.3   Iron severely low -- ordered IV iron   CT chest abdomen pelvis w contrast to rule out malignancy with no acute findings   GI consulted, plan for EGD/colonoscopy on 6/6 (originally planned for 6/5 but had inadequate prep).  NPO @ [facility]    Kandis Crockett)  Pediatrics  [address] [place] [address], [place] [address]  Phone: [phone]  Fax: [phone]  Follow Up Time:

## 2025-06-05 NOTE — APP STUDENT NOTE
[facility] STUDENT  Inpatient Progress Note for TRAINING ONLY  Not Part of Legal Medical Record     Progress Note - Leanne Jiang 79 y.o. female MRN: [id number]  Unit/Bed#: -01 Encounter: [id number]    Assessment:    Principal Problem:    Symptomatic anemia  Active Problems:    Hyperlipidemia    Hypertension    Tremor    Type 2 diabetes mellitus with diabetic microalbuminuria, with long-term current use of insulin (HCC)    Vitamin D deficiency    Proteinuria    History of DVT (deep vein thrombosis)    Family history of gastric cancer      Plan:  Symptomatic Anemia  80 y/o female PMH of HTN, hyperlipidemia, insulin-dependent diabetes, DVT, proteinuria, tremor presenting with hemoglobin of 5.3 as per PCP routine labs, increasing fatigue, generalized weakness, exertional dyspnea, pallor. PCP referred her to ED for further evaluation.  Denies melena, blood in the stool, hematuria, abd pain; denies overt bleeding.  Fairly regular bowel movements, last BM 4 am 6/5  Denies SOB, chest pain, pedal edema  Never had EGD or colonoscopy   Family hx of gastric cancer in mother before age 50  On Eliquis for history of DVT in 2017 -- currently held   S/p 2 units PRBCs, Hgb improved from 5.3 to 8.3  S/P IV iron  CT chest abdomen pelvis w contrast to rule out malignancy with no acute findings   As per GI, bidirectional endoscopy rescheduled to 6/6 due to pt not having a BM after drinking 1/2 the prep  Continue liquid diet, NPO at midnight   History of DVT (deep vein thrombosis)  Hx of 1 LE DVT in 2017, has been on Eliquis since  Eliquis being held for procedures and low hemoglobin   Unclear if pt actually needs to be on it  Hyperlipidemia  Continue Lipitor 20 mg daily  Hypertension  Continue losartan 100 mg daily with holding parameter SBP<130  Tremor  Continue propranolol  Type 2 diabetes mellitus with diabetic microalbuminuria, with long-term current use of insulin (HCC)  Lab Results   Component Value Date    HGBA1C 6.7 (H)  Goal Outcome Evaluation:      Plan of Care Reviewed With: parent    Overall Patient Progress: no change    Outcome Evaluation: Remains stable on RA. One spell this morning requiring blowby to resolve, followed by a short period of increased work of breathing and irritability. Deep suctioned nares with RT using NS lavage, removed a large plug from right nare. Reflux precautions initiated. Work of breathing and desaturations have since improved, with only a few occasional, self-resolved desats. Feeds increased at 0900, tolerating well with no emesis. Plan to advance this evening again.  PRN suppository given with good results.       2025       Recent Labs     254 25  0725   POCGLU 121 133     Blood Sugar Average: Last 72 hrs:  (P) 122Home regimen is metformin, Trulicity, Basaglar 18 units a.m.  Started on Farxiga in December due to proteinuria, discontinued shortly by PCP as she reported not feeling well on it  Will make her NPO at midnight, start insulin sliding scale  Family history of gastric cancer  Mother with hx of   Vitamin D deficiency  Continue vitamin D  Proteinuria  Follows nephrology    VTE Pharmacologic Prophylaxis:   Pharmacologic: Pharmacologic VTE Prophylaxis contraindicated due to ***Eliquis on hold for procedures  Mechanical VTE Prophylaxis in Place: Yes    Patient Centered Rounds: {Patient Centered Rounds:28913}    Discussions with Specialists or Other Care Team Provider: ***    Education and Discussions with Family / Patient: ***    Time Spent for Care: {Time; Time 15 min - 1 hour:18821}.  More than 50% of total time spent on counseling and coordination of care as described above.    Current Length of Stay: 2 day(s)    Current Patient Status: Inpatient   Certification Statement: {Certification Statement:10017}    Discharge Plan: ***    Code Status: Level 1 - Full Code    Subjective:   Feeling better physically, LE swelling improved and no SOB. upset that colonoscopy and EGD were rescheduled, feeling anxious about being scoped.    Objective:     Vitals:   Temp (24hrs), Av.1 °F (36.7 °C), Min:98 °F (36.7 °C), Max:98.3 °F (36.8 °C)    Temp:  [98 °F (36.7 °C)-98.3 °F (36.8 °C)] 98.1 °F (36.7 °C)  HR:  [63-71] 69  Resp:  [16-19] 16  BP: (140-163)/(65-79) 140/70  SpO2:  [97 %-99 %] 98 %  Body mass index is 25.28 kg/m².     Input and Output Summary (last 24 hours):       Intake/Output Summary (Last 24 hours) at 2025 0825  Last data filed at 2025 1816  Gross per 24 hour   Intake 1120 ml   Output --   Net 1120 ml       Physical Exam:     Physical Exam  Constitutional:       General: She is not in  acute distress.     Appearance: Normal appearance. She is normal weight.   HENT:      Head: Normocephalic and atraumatic.     Cardiovascular:      Rate and Rhythm: Normal rate and regular rhythm.      Pulses: Normal pulses.      Heart sounds: Normal heart sounds. No murmur heard.     No gallop.   Pulmonary:      Effort: Pulmonary effort is normal. No respiratory distress.      Breath sounds: Normal breath sounds.   Abdominal:      General: There is no distension.      Palpations: Abdomen is soft.      Tenderness: There is no abdominal tenderness. There is no guarding.     Musculoskeletal:      Right lower leg: No edema (improved, almost at baseline).      Left lower leg: No edema (improved, almost at baseline).     Skin:     General: Skin is warm and dry.      Capillary Refill: Capillary refill takes less than 2 seconds.      Coloration: Skin is not pale.      Findings: No bruising.     Neurological:      General: No focal deficit present.      Mental Status: She is alert and oriented to person, place, and time. Mental status is at baseline.     Psychiatric:         Mood and Affect: Mood normal.         Thought Content: Thought content normal.       ( *** Be Sure to Include Physical Exam: Delete this entire line when you have entered your exam)    Historical Information   Past Medical History[1]  Past Surgical History[2]  Social History   Social History     Substance and Sexual Activity   Alcohol Use Never     Social History     Substance and Sexual Activity   Drug Use No     Tobacco Use History[3]  Family History: {ISAAK HORN FAM HISTORY SMARTLIST:505314622}    Meds/Allergies   {ISAAK HORN H&P MEDS:859649021}  Allergies[4]    Additional Data:     Labs:    Results from last 7 days   Lab Units 06/05/25  0530   WBC Thousand/uL 5.70   HEMOGLOBIN g/dL 8.3*   HEMATOCRIT % 28.7*   PLATELETS Thousands/uL 325   SEGS PCT % 60   LYMPHO PCT % 23   MONO PCT % 13*   EOS PCT % 2     Results from last 7 days   Lab Units 06/05/25  0530  06/03/25  1649 06/03/25  1330   SODIUM mmol/L 136   < > 139   POTASSIUM mmol/L 4.0   < > 5.0   CHLORIDE mmol/L 103   < > 105   CO2 mmol/L 24   < > 26   BUN mg/dL 11   < > 20   CREATININE mg/dL 0.61   < > 0.72   ANION GAP mmol/L 9   < > 8   CALCIUM mg/dL 10.4*   < > 9.9   ALBUMIN g/dL  --   --  4.0   TOTAL BILIRUBIN mg/dL  --   --  0.59   ALK PHOS U/L  --   --  62   ALT U/L  --   --  6*   AST U/L  --   --  14   GLUCOSE RANDOM mg/dL 129   < > 118    < > = values in this interval not displayed.     Results from last 7 days   Lab Units 06/03/25  1917   INR  1.39*     Results from last 7 days   Lab Units 06/05/25  0725 06/04/25  2044 06/04/25  1652 06/04/25  1130 06/04/25  0754   POC GLUCOSE mg/dl 133 121 120 125 119                 * I Have Reviewed All Lab Data Listed Above.  * Additional Pertinent Lab Tests Reviewed: {Labreview:09853}    Imaging:    Imaging Reports Reviewed Today Include: ***  Imaging Personally Reviewed by Myself Includes:  ***    Recent Cultures (last 7 days):           Last 24 Hours Medication List:   Current Facility-Administered Medications   Medication Dose Route Frequency Provider Last Rate    acetaminophen  650 mg Oral Q6H PRN Myriam Aguilar MD      [Held by provider] apixaban  5 mg Oral BID Myriam Aguilar MD      atorvastatin  20 mg Oral Daily With Dinner Myriam Aguilar MD      cholecalciferol  1,000 Units Oral Daily Myriam Aguilar MD      [Held by provider] insulin glargine  18 Units Subcutaneous QAM Myriam Aguilar MD      insulin lispro  1-5 Units Subcutaneous TID AC Myriam Aguilar MD      iron sucrose  200 mg Intravenous Daily Adrianna Valdovinos PA-C 200 mg (06/04/25 1712)    latanoprost  1 drop Both Eyes HS Myriam Aguilar MD      losartan  100 mg Oral Daily Myriam Aguilar MD      polyethylene glycol  238 g Oral Once Je Lovett DO      propranolol  20 mg Oral Q12H GENARO Myriam Aguilar MD          Today, Patient Was Seen By: Sheela Siddiqui    ** Please Note:  Dictation voice to text software may have been used in the creation of this document. **         [1]   Past Medical History:  Diagnosis Date    Diabetes (HCC)     Diabetes mellitus (HCC)     Disease of thyroid gland     DVT, bilateral lower limbs (HCC)     Hyperlipidemia     Hypertension     Hypertension     Kidney stones     Renal disorder    [2]   Past Surgical History:  Procedure Laterality Date    HYSTERECTOMY      partial   [3]   Social History  Tobacco Use   Smoking Status Never    Passive exposure: Never   Smokeless Tobacco Never   [4]   Allergies  Allergen Reactions    Sulfa Antibiotics Rash

## 2025-06-05 NOTE — ASSESSMENT & PLAN NOTE
Lab Results   Component Value Date    HGBA1C 6.7 (H) 03/24/2025       Recent Labs     06/04/25  1130 06/04/25  1652 06/04/25 2044 06/05/25  0725   POCGLU 125 120 121 133       Blood Sugar Average: Last 72 hrs:  (P) 123.6Home regimen is metformin, Trulicity, Basaglar 18 units a.m.  She was started in December on Farxiga due to proteinuria, she has not been feeling well, thought that it was secondary to Farxiga and it was stopped recently by PCP  Will make her n.p.o. after midnight, start insulin sliding scale

## 2025-06-05 NOTE — PROGRESS NOTES
Progress Note - Hospitalist   Name: Leanne Jiang 79 y.o. female I MRN: [id number]  Unit/Bed#: -01 I Date of Admission: 6/3/2025   Date of Service: 6/5/2025 I Hospital Day: 2    Assessment & Plan  Symptomatic anemia  Has had increasing fatigue, generalized weakness, exertional dyspnea, pallor.  PCP ordered routine labs, found to be severely anemic with Hgb 5.3 so referred to ED for admission/work up.  Denies melena, blood in the stool, hematuria, abd pain; denies overt bleeding.  Fairly regular bowel movements  Never had EGD or colonoscopy.  Her mother had gastric cancer.    On Eliquis for history of DVT -- currently held   S/p 2 units PRBCs, Hgb improved from 5.3 to 7.4.  Labs 6/5 Hgb 8.3   Iron severely low -- ordered IV iron   CT chest abdomen pelvis w contrast to rule out malignancy with no acute findings   GI consulted, plan for EGD/colonoscopy on 6/6 (originally planned for 6/5 but had inadequate prep).  NPO @ [facility]   History of DVT (deep vein thrombosis)  History of DVT years ago, per son probably 10 years ago.  Unknown if it was provoked or unprovoked, currently on Eliquis  Hold Eliquis in setting of above.  Unclear if truly needs this.   Iron deficiency  Noted severe iron deficiency, ordered IV Venofer while IP   Hyperlipidemia  Continue Lipitor 20 mg daily  Hypertension  Continue losartan 100 mg daily with holding parameter SBP<130  Tremor  Continue propranolol  Type 2 diabetes mellitus with diabetic microalbuminuria, with long-term current use of insulin ([facility])  Lab Results   Component Value Date    HGBA1C 6.7 (H) 03/24/2025       Recent Labs     06/04/25  1130 06/04/25  1652 06/04/25  2044 06/05/25  0725   POCGLU 125 120 121 133       Blood Sugar Average: Last 72 hrs:  (P) 123.6Home regimen is metformin, Trulicity, Basaglar 18 units a.m.  She was started in December on Farxiga due to proteinuria, she has not been feeling well, thought that it was secondary to Farxiga and it was stopped recently by  "PCP  Will make her n.p.o. after midnight, start insulin sliding scale  Family history of gastric cancer  Mother with hx of   Vitamin D deficiency  Continue vitamin D  Proteinuria  Follows nephrology    VTE Pharmacologic Prophylaxis:   Moderate Risk (Score 3-4) - Pharmacological DVT Prophylaxis Contraindicated. Sequential Compression Devices Ordered.    Mobility:   Basic Mobility Inpatient Raw Score: 24  JH-HLM Goal: 8: Walk 250 feet or more  JH-HLM Achieved: 7: Walk 25 feet or more  JH-HLM Goal NOT achieved. Continue with multidisciplinary rounding and encourage appropriate mobility to improve upon JH-HLM goals.    Patient Centered Rounds: I evaluated the patient without nursing staff present due to w/ other pt.   Discussions with Specialists or Other Care Team Provider: GI    Education and Discussions with Family / Patient: Attempted to update  (son) via phone. Left voicemail.     Current Length of Stay: 2 day(s)  Current Patient Status: Inpatient   Certification Statement: The patient will continue to require additional inpatient hospital stay due to pending egd/colon for evaluation of severe symptomatic anemia =  Discharge Plan: Anticipate discharge in 24-48 hrs to home.    Code Status: Level 1 - Full Code    Subjective   No acute complaints offered, she feels well.  She reports she is not used to sitting still so much as she typically is always \"on the go\"     Objective :  Temp:  [98 °F (36.7 °C)-98.3 °F (36.8 °C)] 98.1 °F (36.7 °C)  HR:  [63-71] 69  BP: (140-163)/(65-79) 140/70  Resp:  [16-19] 16  SpO2:  [97 %-99 %] 98 %  O2 Device: None (Room air)    Body mass index is 25.28 kg/m².     Input and Output Summary (last 24 hours):     Intake/Output Summary (Last 24 hours) at 6/5/2025 0918  Last data filed at 6/4/2025 1816  Gross per 24 hour   Intake 820 ml   Output --   Net 820 ml       Physical Exam  Vitals reviewed.   Constitutional:       General: She is not in acute distress.     Appearance: She " is not ill-appearing or toxic-appearing.   HENT:      Head: Normocephalic and atraumatic.     Eyes:      Extraocular Movements: Extraocular movements intact.       Cardiovascular:      Rate and Rhythm: Normal rate and regular rhythm.   Pulmonary:      Effort: Pulmonary effort is normal. No respiratory distress.   Abdominal:      General: There is no distension.      Palpations: Abdomen is soft.     Musculoskeletal:         General: Normal range of motion.     Neurological:      General: No focal deficit present.      Mental Status: She is alert and oriented to person, place, and time.     Psychiatric:         Mood and Affect: Mood normal.         Behavior: Behavior normal.       Lines/Drains:              Lab Results: I have reviewed the following results:   Results from last 7 days   Lab Units 06/05/25  0530   WBC Thousand/uL 5.70   HEMOGLOBIN g/dL 8.3*   HEMATOCRIT % 28.7*   PLATELETS Thousands/uL 325   SEGS PCT % 60   LYMPHO PCT % 23   MONO PCT % 13*   EOS PCT % 2     Results from last 7 days   Lab Units 06/05/25  0530 06/03/25  1649 06/03/25  1330   SODIUM mmol/L 136   < > 139   POTASSIUM mmol/L 4.0   < > 5.0   CHLORIDE mmol/L 103   < > 105   CO2 mmol/L 24   < > 26   BUN mg/dL 11   < > 20   CREATININE mg/dL 0.61   < > 0.72   ANION GAP mmol/L 9   < > 8   CALCIUM mg/dL 10.4*   < > 9.9   ALBUMIN g/dL  --   --  4.0   TOTAL BILIRUBIN mg/dL  --   --  0.59   ALK PHOS U/L  --   --  62   ALT U/L  --   --  6*   AST U/L  --   --  14   GLUCOSE RANDOM mg/dL 129   < > 118    < > = values in this interval not displayed.     Results from last 7 days   Lab Units 06/03/25  1917   INR  1.39*     Results from last 7 days   Lab Units 06/05/25  0725 06/04/25  2044 06/04/25  1652 06/04/25  1130 06/04/25  0754   POC GLUCOSE mg/dl 133 121 120 125 119               Recent Cultures (last 7 days):         Imaging Results Review: No pertinent imaging studies reviewed.  Other Study Results Review: No additional pertinent studies  reviewed.    Last 24 Hours Medication List:     Current Facility-Administered Medications:     acetaminophen (TYLENOL) tablet 650 mg, Q6H PRN    [Held by provider] apixaban (ELIQUIS) tablet 5 mg, BID    atorvastatin (LIPITOR) tablet 20 mg, Daily With Dinner    Cholecalciferol (VITAMIN D3) tablet 1,000 Units, Daily    cyanocobalamin (VITAMIN B-12) tablet 1,000 mcg, Daily    [Held by provider] insulin glargine (LANTUS) subcutaneous injection 18 Units 0.18 mL, QAM    insulin lispro (HumALOG/ADMELOG) 100 units/mL subcutaneous injection 1-5 Units, TID AC **AND** Fingerstick Glucose (POCT), TID AC    iron sucrose (VENOFER) 200 mg in sodium chloride 0.9 % 100 mL IVPB, Daily, Last Rate: 200 mg (06/05/25 0837)    latanoprost (XALATAN) 0.005 % ophthalmic solution 1 drop, HS    losartan (COZAAR) tablet 100 mg, Daily    polyethylene glycol (GLYCOLAX) bowel prep 238 g, Once    propranolol (INDERAL) tablet 20 mg, Q12H GENARO    Administrative Statements   Today, Patient Was Seen By: Adrianna Valdovinos PA-C      **Please Note: This note may have been constructed using a voice recognition system.**

## 2025-06-05 NOTE — QUICK NOTE
GASTROENTEROLOGY QUICK NOTE:     Discussed with nursing that patient drink only half the bowel preparation and still having brown stools.  We will plan for an additional day of bowel preparation today while on clear liquid diet and plan for bidirectional endoscopy tomorrow.  NPO at midnight. GI will follow    DO CHANDLER Orta Gastroenterology Fellow, PGY-4

## 2025-06-06 ENCOUNTER — ANESTHESIA EVENT (INPATIENT)
Dept: GASTROENTEROLOGY | Facility: HOSPITAL | Age: 80
DRG: 375 | End: 2025-06-06
Payer: MEDICARE

## 2025-06-06 ENCOUNTER — ANESTHESIA (INPATIENT)
Dept: GASTROENTEROLOGY | Facility: HOSPITAL | Age: 80
DRG: 375 | End: 2025-06-06
Payer: MEDICARE

## 2025-06-06 ENCOUNTER — APPOINTMENT (INPATIENT)
Dept: GASTROENTEROLOGY | Facility: HOSPITAL | Age: 80
DRG: 375 | End: 2025-06-06
Attending: STUDENT IN AN ORGANIZED HEALTH CARE EDUCATION/TRAINING PROGRAM
Payer: MEDICARE

## 2025-06-06 PROBLEM — K63.89 COLONIC MASS: Status: ACTIVE | Noted: 2025-06-06

## 2025-06-06 LAB
ANION GAP SERPL CALCULATED.3IONS-SCNC: 9 MMOL/L (ref 4–13)
BASOPHILS # BLD AUTO: 0.04 THOUSANDS/ÂΜL (ref 0–0.1)
BASOPHILS NFR BLD AUTO: 1 % (ref 0–1)
BUN SERPL-MCNC: 10 MG/DL (ref 5–25)
CALCIUM SERPL-MCNC: 9.7 MG/DL (ref 8.4–10.2)
CHLORIDE SERPL-SCNC: 104 MMOL/L (ref 96–108)
CO2 SERPL-SCNC: 26 MMOL/L (ref 21–32)
CREAT SERPL-MCNC: 0.6 MG/DL (ref 0.6–1.3)
EOSINOPHIL # BLD AUTO: 0.1 THOUSAND/ÂΜL (ref 0–0.61)
EOSINOPHIL NFR BLD AUTO: 2 % (ref 0–6)
ERYTHROCYTE [DISTWIDTH] IN BLOOD BY AUTOMATED COUNT: 25.3 % (ref 11.6–15.1)
GFR SERPL CREATININE-BSD FRML MDRD: 87 ML/MIN/1.73SQ M
GLUCOSE SERPL-MCNC: 113 MG/DL (ref 65–140)
GLUCOSE SERPL-MCNC: 136 MG/DL (ref 65–140)
GLUCOSE SERPL-MCNC: 138 MG/DL (ref 65–140)
GLUCOSE SERPL-MCNC: 190 MG/DL (ref 65–140)
HCT VFR BLD AUTO: 27.3 % (ref 34.8–46.1)
HGB BLD-MCNC: 7.9 G/DL (ref 11.5–15.4)
IMM GRANULOCYTES # BLD AUTO: 0.02 THOUSAND/UL (ref 0–0.2)
IMM GRANULOCYTES NFR BLD AUTO: 0 % (ref 0–2)
LYMPHOCYTES # BLD AUTO: 1.61 THOUSANDS/ÂΜL (ref 0.6–4.47)
LYMPHOCYTES NFR BLD AUTO: 32 % (ref 14–44)
MCH RBC QN AUTO: 19.8 PG (ref 26.8–34.3)
MCHC RBC AUTO-ENTMCNC: 28.9 G/DL (ref 31.4–37.4)
MCV RBC AUTO: 68 FL (ref 82–98)
MONOCYTES # BLD AUTO: 0.62 THOUSAND/ÂΜL (ref 0.17–1.22)
MONOCYTES NFR BLD AUTO: 12 % (ref 4–12)
NEUTROPHILS # BLD AUTO: 2.72 THOUSANDS/ÂΜL (ref 1.85–7.62)
NEUTS SEG NFR BLD AUTO: 53 % (ref 43–75)
NRBC BLD AUTO-RTO: 0 /100 WBCS
PLATELET # BLD AUTO: 310 THOUSANDS/UL (ref 149–390)
PMV BLD AUTO: 9.3 FL (ref 8.9–12.7)
POTASSIUM SERPL-SCNC: 3.7 MMOL/L (ref 3.5–5.3)
RBC # BLD AUTO: 3.99 MILLION/UL (ref 3.81–5.12)
SODIUM SERPL-SCNC: 139 MMOL/L (ref 135–147)
WBC # BLD AUTO: 5.11 THOUSAND/UL (ref 4.31–10.16)

## 2025-06-06 PROCEDURE — 45331 SIGMOIDOSCOPY AND BIOPSY: CPT | Performed by: INTERNAL MEDICINE

## 2025-06-06 PROCEDURE — 85025 COMPLETE CBC W/AUTO DIFF WBC: CPT | Performed by: PHYSICIAN ASSISTANT

## 2025-06-06 PROCEDURE — 99232 SBSQ HOSP IP/OBS MODERATE 35: CPT | Performed by: NURSE PRACTITIONER

## 2025-06-06 PROCEDURE — 43239 EGD BIOPSY SINGLE/MULTIPLE: CPT | Performed by: INTERNAL MEDICINE

## 2025-06-06 PROCEDURE — 45335 SIGMOIDOSCOPY W/SUBMUC INJ: CPT | Performed by: INTERNAL MEDICINE

## 2025-06-06 PROCEDURE — 0DB98ZX EXCISION OF DUODENUM, VIA NATURAL OR ARTIFICIAL OPENING ENDOSCOPIC, DIAGNOSTIC: ICD-10-PCS | Performed by: INTERNAL MEDICINE

## 2025-06-06 PROCEDURE — 80048 BASIC METABOLIC PNL TOTAL CA: CPT | Performed by: PHYSICIAN ASSISTANT

## 2025-06-06 PROCEDURE — 0DBN8ZX EXCISION OF SIGMOID COLON, VIA NATURAL OR ARTIFICIAL OPENING ENDOSCOPIC, DIAGNOSTIC: ICD-10-PCS | Performed by: INTERNAL MEDICINE

## 2025-06-06 PROCEDURE — NC001 PR NO CHARGE: Performed by: SURGERY

## 2025-06-06 PROCEDURE — 88305 TISSUE EXAM BY PATHOLOGIST: CPT | Performed by: PATHOLOGY

## 2025-06-06 PROCEDURE — 0DB68ZX EXCISION OF STOMACH, VIA NATURAL OR ARTIFICIAL OPENING ENDOSCOPIC, DIAGNOSTIC: ICD-10-PCS | Performed by: INTERNAL MEDICINE

## 2025-06-06 PROCEDURE — 82948 REAGENT STRIP/BLOOD GLUCOSE: CPT

## 2025-06-06 RX ORDER — SODIUM CHLORIDE 9 MG/ML
INJECTION, SOLUTION INTRAVENOUS CONTINUOUS PRN
Status: DISCONTINUED | OUTPATIENT
Start: 2025-06-06 | End: 2025-06-06

## 2025-06-06 RX ORDER — PROPOFOL 10 MG/ML
INJECTION, EMULSION INTRAVENOUS AS NEEDED
Status: DISCONTINUED | OUTPATIENT
Start: 2025-06-06 | End: 2025-06-06

## 2025-06-06 RX ORDER — LIDOCAINE HYDROCHLORIDE 10 MG/ML
INJECTION, SOLUTION EPIDURAL; INFILTRATION; INTRACAUDAL; PERINEURAL AS NEEDED
Status: DISCONTINUED | OUTPATIENT
Start: 2025-06-06 | End: 2025-06-06

## 2025-06-06 RX ADMIN — PROPRANOLOL HYDROCHLORIDE 20 MG: 20 TABLET ORAL at 20:45

## 2025-06-06 RX ADMIN — LOSARTAN POTASSIUM 100 MG: 50 TABLET, FILM COATED ORAL at 08:32

## 2025-06-06 RX ADMIN — LATANOPROST 1 DROP: 50 SOLUTION OPHTHALMIC at 21:57

## 2025-06-06 RX ADMIN — CYANOCOBALAMIN TAB 500 MCG 1000 MCG: 500 TAB at 08:32

## 2025-06-06 RX ADMIN — SODIUM CHLORIDE: 0.9 INJECTION, SOLUTION INTRAVENOUS at 13:32

## 2025-06-06 RX ADMIN — Medication 1000 UNITS: at 08:32

## 2025-06-06 RX ADMIN — PROPOFOL 80 MCG/KG/MIN: 10 INJECTION, EMULSION INTRAVENOUS at 13:02

## 2025-06-06 RX ADMIN — SODIUM CHLORIDE 200 MG: 9 INJECTION, SOLUTION INTRAVENOUS at 08:32

## 2025-06-06 RX ADMIN — LIDOCAINE HYDROCHLORIDE 50 MG: 10 INJECTION, SOLUTION EPIDURAL; INFILTRATION; INTRACAUDAL at 13:00

## 2025-06-06 RX ADMIN — SODIUM CHLORIDE: 0.9 INJECTION, SOLUTION INTRAVENOUS at 12:35

## 2025-06-06 RX ADMIN — PROPOFOL 30 MG: 10 INJECTION, EMULSION INTRAVENOUS at 13:36

## 2025-06-06 RX ADMIN — PROPRANOLOL HYDROCHLORIDE 20 MG: 20 TABLET ORAL at 08:32

## 2025-06-06 RX ADMIN — PROPOFOL 70 MG: 10 INJECTION, EMULSION INTRAVENOUS at 13:01

## 2025-06-06 NOTE — ASSESSMENT & PLAN NOTE
79-year-old female found to have sigmoid mass on 6/6 colonoscopy.  Status post 2u pRBC for symptomatic anemia.    AVSS on room   Abdomen soft, nontender, mildly distended    6/4 CT CAP: no evidence of malignancy    6/6 colonoscopy: sigmoid mass with narrowing, traversable only with thin scope.     Plan:   - No acute surgical intervention  - Follow up pending CEA  - Follow-up pending 6/6 cscope biopsies  - Outpatient follow-up with colorectal surgery  - Cardiology risk stratification, optimization, and anticoagulation plan if deemed a surgical candidate  - Remainder of care per primary

## 2025-06-06 NOTE — CASE MANAGEMENT
Case Management ED Assessment & Discharge Planning Note    Patient name Leanne Jiang  Location /-01 MRN [id number]  : 1945 Date 2025        OBJECTIVE:  Predictive Model Details          3%  Factor Value    Risk of Hospital Admission or ED Visit Model 60% Is in Relationship Yes     14% Number of Hospitalizations 1     9% Has Anemia Yes     8% Has Medicare Yes     7% Has Diabetes Yes     2% Has PCP Yes            Chief Complaint: Symptomatic anemia .  Patient Class: Inpatient  Preferred Pharmacy:   [id number] - [place], [place] - [address]  [facility]  [place] [address]  Phone: [phone] Fax: [phone]    [facility] - [place], [place] - [address]  [address]  [address]  [facility] [address]  Phone: [phone] Fax: [phone]    [facility] - [place], [place] - [address]  [address] [facility] [address]  [facility] [address]  Phone: [phone] Fax: [phone]    Primary Care Provider: Garth Garcia MD    Primary Insurance: MEDICARE  Secondary Insurance: AARP      ED Assessment:  Active Health Care Proxies    There are no active Health Care Proxies on file.          Readmission Root Cause  30 Day Readmission: No        Patient Information  Admitted from:: Home  Mental Status: Alert  During Assessment patient was accompanied by: Not accompanied during assessment  Assessment information provided by:: Patient  Primary Caregiver: Self  Support Systems: Son, Other (Comment) (Bharath Fernandez)  County of Residence: [place]  What city do you live in[place]  Home entry access options. Select all that apply.: No steps to enter home (through garage NSTE)  Type of Current Residence: 2 story home  Upon entering residence, is there a bedroom on the main floor (no further steps)?: Yes  Upon entering residence, is there a bathroom on the main floor (no further steps)?:  Yes  Living Arrangements: Lives w/ Son  Is patient a ?: No  Patient Information Continued  Income Source: Pension/FCI  Does patient have prescription coverage?: Yes  Can the patient afford their medications and any related supplies (such as glucometers or test strips)?: Yes  Does patient receive dialysis treatments?: No  Does patient have a history of substance abuse?: No  Does patient have a history of Mental Health Diagnosis?: No         ED Discharge Details:    Discharge planning discussed with:: Patient  Freedom of Choice: Yes     CM contacted family/caregiver?: No- see comments  Were Treatment Team discharge recommendations reviewed with patient/caregiver?: Yes  Did patient/caregiver verbalize understanding of patient care needs?: Yes  Were patient/caregiver advised of the risks associated with not following Treatment Team discharge recommendations?: Yes  Contacts  Patient Contacts: Rebecca Oliver  Relationship to Patient:: Family (Patient called her granddaughter while CM was in the room)  Contact Method: Phone  Phone Number: [phone]  Reason/Outcome: Emergency Contact  Requested Home Health Care         Is the patient interested in HHC at discharge?: No  DME Referral Provided  Referral made for DME?: No            Treatment Team Recommendation: Home  Discharge Destination Plan:: Home     Transport at Discharge : Family         Pt lives in two story house there are 10 Steps to the second floor. Pt's son Parviz has been living with her for the past year he suffered a Medical issue and is currently having different medical problems. Cm explained to her that Parviz needs to contact SSDI to schedule an appointment to file for SSD. He can also request a  through is PCP's office as he has other Medical needs that need to be addressed.     The son's needs are causing stress for this patient.

## 2025-06-06 NOTE — PROGRESS NOTES
Progress Note - Hospitalist   Name: Leanne Jiang 79 y.o. female I MRN: [id number]  Unit/Bed#: -01 I Date of Admission: 6/3/2025   Date of Service: 6/6/2025 I Hospital Day: 3    Assessment & Plan  Symptomatic anemia  Has had increasing fatigue, generalized weakness, exertional dyspnea, pallor.  PCP ordered routine labs, found to be severely anemic with Hgb 5.3 so referred to ED for admission/work up.  Denies melena, blood in the stool, hematuria, abd pain; denies overt bleeding.  Fairly regular bowel movements  Never had EGD or colonoscopy.  Her mother had gastric cancer.    On Eliquis for history of DVT -- currently held   S/p 2 units PRBCs, Hgb improved from 5.3 to 7.4.  Labs 6/5 Hgb 8.3   Iron severely low -- ordered IV iron   CT chest abdomen pelvis w contrast to rule out malignancy:  with no acute findings   GI consulted, s/p  EGD/colonoscopy on 6/6: Single malignant-appearing mass in the sigmoid colon 35 cm from the anal verge; performed cold forceps biopsy. There was significant narrowing with friability extending a few centimeters distal to the mass that was only traversable with the slim colonoscope.  Tattoo was placed 5 cm distal to the area of narrowing at approximately 30 cm.  The rectum appeared normal.  Firm, mobile, submucosal lesion palpated on external examination to the left of the anus measuring approximately 3 cm  Discussed with gi today pending input from surgery   Pending CEA   Follow up on scope biopsies  Outpatient follow up  with colorectal surgery  Will need risk stratification cardiology - will consult for input optimization and   ac plan if deemed surgical candidate     History of DVT (deep vein thrombosis)  History of DVT years ago, per son probably 10 years ago.  Unknown if it was provoked or unprovoked, currently on Eliquis  Hold Eliquis in setting of above.  Unclear if truly needs this.   Iron deficiency  Noted severe iron deficiency, ordered IV Venofer while IP    Hyperlipidemia  Continue Lipitor 20 mg daily  Hypertension  Continue losartan 100 mg daily with holding parameter SBP<130  Tremor  Continue propranolol  Type 2 diabetes mellitus with diabetic microalbuminuria, with long-term current use of insulin ([facility])  Lab Results   Component Value Date    HGBA1C 6.7 (H) 03/24/2025       Recent Labs     06/05/25  1637 06/05/25  2107 06/06/25  0717 06/06/25  1139   POCGLU 109 120 190* 136       Blood Sugar Average: Last 72 hrs:  (P) 130.8Home regimen is metformin, Trulicity, Basaglar 18 units a.m.  She was started in December on Farxiga due to proteinuria, she has not been feeling well, thought that it was secondary to Farxiga and it was stopped recently by PCP  Will make her n.p.o. after midnight, start insulin sliding scale  Family history of gastric cancer  Mother with hx of   Colonic mass  Noted on colonoscopy 6/6:Single malignant-appearing mass in the sigmoid colon 35 cm from the anal verge; performed cold forceps biopsy. There was significant narrowing with friability extending a few centimeters distal to the mass that was only traversable with the slim colonoscope.  Tattoo was placed 5 cm distal to the area of narrowing at approximately 30 cm.The rectum appeared normal.Firm, mobile, submucosal lesion palpated on external examination to the left of the anus measuring approximately 3 cm  Vitamin D deficiency  Continue vitamin D  Proteinuria  Follows nephrology    VTE Pharmacologic Prophylaxis:   High Risk (Score >/= 5) - Pharmacological DVT Prophylaxis Contraindicated. Sequential Compression Devices Ordered.    Mobility:   Basic Mobility Inpatient Raw Score: 24  JH-HLM Goal: 8: Walk 250 feet or more  JH-HLM Achieved: 8: Walk 250 feet ot more  JH-HLM Goal achieved. Continue to encourage appropriate mobility.    Patient Centered Rounds: I performed bedside rounds with nursing staff today.   Discussions with Specialists or Other Care Team Provider: nursing / gi     Education  and Discussions with Family / Patient: Updated  (son and grandson) at bedside. Both both sone at the bedside     Current Length of Stay: 3 day(s)  Current Patient Status: Inpatient   Certification Statement: The patient will continue to require additional inpatient hospital stay due to pending further procedure and surgery consultations   Discharge Plan: Anticipate discharge in >72 hrs to home.    Code Status: Level 1 - Full Code    Subjective   Upon arrival to the room pt has just returned from gi lab many family at the bedside . Pt has no abdominal pain no abnormal bm but states can be hard/soft and runny at times no blood in stool.     Objective :  Temp:  [97.8 °F (36.6 °C)-98.9 °F (37.2 °C)] 98.9 °F (37.2 °C)  HR:  [60-71] 68  BP: (114-187)/(53-89) 114/73  Resp:  [15-20] 20  SpO2:  [96 %-98 %] 98 %  O2 Device: None (Room air)    Body mass index is 25.28 kg/m².     Input and Output Summary (last 24 hours):     Intake/Output Summary (Last 24 hours) at 6/6/2025 1443  Last data filed at 6/6/2025 1339  Gross per 24 hour   Intake 1100 ml   Output 500 ml   Net 600 ml       Physical Exam  Constitutional:       General: She is not in acute distress.     Appearance: She is not ill-appearing, toxic-appearing or diaphoretic.   HENT:      Head: Normocephalic and atraumatic.      Ears:      Comments: Very hard of hearing      Nose: No congestion.     Eyes:      General:         Right eye: No discharge.         Left eye: No discharge.       Cardiovascular:      Rate and Rhythm: Normal rate.      Heart sounds: No murmur heard.     No friction rub. No gallop.   Pulmonary:      Effort: No respiratory distress.      Breath sounds: No stridor. No wheezing, rhonchi or rales.   Chest:      Chest wall: No tenderness.   Abdominal:      General: There is no distension.      Palpations: Abdomen is soft. There is no mass.      Tenderness: There is no abdominal tenderness. There is no guarding or rebound.      Hernia: No  hernia is present.     Musculoskeletal:         General: No swelling, tenderness, deformity or signs of injury.      Right lower leg: No edema.      Left lower leg: No edema.     Skin:     Coloration: Skin is not jaundiced or pale.      Findings: No bruising, erythema, lesion or rash.     Neurological:      Mental Status: She is alert and oriented to person, place, and time.     Psychiatric:         Behavior: Behavior normal.       Lines/Drains:        Lab Results: I have reviewed the following results:   Results from last 7 days   Lab Units 06/06/25  0527   WBC Thousand/uL 5.11   HEMOGLOBIN g/dL 7.9*   HEMATOCRIT % 27.3*   PLATELETS Thousands/uL 310   SEGS PCT % 53   LYMPHO PCT % 32   MONO PCT % 12   EOS PCT % 2     Results from last 7 days   Lab Units 06/06/25  0527 06/03/25  1649 06/03/25  1330   SODIUM mmol/L 139   < > 139   POTASSIUM mmol/L 3.7   < > 5.0   CHLORIDE mmol/L 104   < > 105   CO2 mmol/L 26   < > 26   BUN mg/dL 10   < > 20   CREATININE mg/dL 0.60   < > 0.72   ANION GAP mmol/L 9   < > 8   CALCIUM mg/dL 9.7   < > 9.9   ALBUMIN g/dL  --   --  4.0   TOTAL BILIRUBIN mg/dL  --   --  0.59   ALK PHOS U/L  --   --  62   ALT U/L  --   --  6*   AST U/L  --   --  14   GLUCOSE RANDOM mg/dL 113   < > 118    < > = values in this interval not displayed.     Results from last 7 days   Lab Units 06/03/25  1917   INR  1.39*     Results from last 7 days   Lab Units 06/06/25  1139 06/06/25  0717 06/05/25  2107 06/05/25  1637 06/05/25  1134 06/05/25  0725 06/04/25  2044 06/04/25  1652 06/04/25  1130 06/04/25  0754   POC GLUCOSE mg/dl 136 190* 120 109 135 133 121 120 125 119               Recent Cultures (last 7 days):         Imaging Results Review: I reviewed radiology reports from this admission including: CT chest and CT abdomen/pelvis.  Other Study Results Review: No additional pertinent studies reviewed.    Last 24 Hours Medication List:     Current Facility-Administered Medications:     acetaminophen (TYLENOL)  tablet 650 mg, Q6H PRN    [Held by provider] apixaban (ELIQUIS) tablet 5 mg, BID    atorvastatin (LIPITOR) tablet 20 mg, Daily With Dinner    Cholecalciferol (VITAMIN D3) tablet 1,000 Units, Daily    cyanocobalamin (VITAMIN B-12) tablet 1,000 mcg, Daily    [Held by provider] insulin glargine (LANTUS) subcutaneous injection 18 Units 0.18 mL, QAM    insulin lispro (HumALOG/ADMELOG) 100 units/mL subcutaneous injection 1-5 Units, TID AC **AND** Fingerstick Glucose (POCT), TID AC    latanoprost (XALATAN) 0.005 % ophthalmic solution 1 drop, HS    losartan (COZAAR) tablet 100 mg, Daily    propranolol (INDERAL) tablet 20 mg, Q12H GENARO    Administrative Statements   Today, Patient Was Seen By: LALI Li      **Please Note: This note may have been constructed using a voice recognition system.**

## 2025-06-06 NOTE — ED ATTENDING ATTESTATION
6/3/2025  I, Gloria Kuhn MD, saw and evaluated the patient. I have discussed the patient with the resident/non-physician practitioner and agree with the resident's/non-physician practitioner's findings, Plan of Care, and MDM as documented in the resident's/non-physician practitioner's note, except where noted. All available labs and Radiology studies were reviewed.  I was present for key portions of any procedure(s) performed by the resident/non-physician practitioner and I was immediately available to provide assistance.       At this point I agree with the current assessment done in the Emergency Department.  I have conducted an independent evaluation of this patient a history and physical is as follows:    This is an evaluation of a 79 year-old female who presents to the emergency department after .  Being informed by her PCP that she was anemic.  Patient states that she has been feeling fatigued with exertional shortness of breath and occasional lightheadedness.  Patient does have a history of DVT on Eliquis.  She normally is able to mow her lawn and has not been able to do so recently.  She went to her PCP for evaluation blood work was done and she was found to be severely anemic.  Patient denies any nausea vomiting or darkened stools.  Also denies any blood in stools.  Is tolerating p.o. but slightly less than typical.  No chest pain.  No dizziness.  On exam patient is pale appearing.  Mildly fatigued appearing.  Vital signs are currently stable.  HEENT is normocephalic and atraumatic moist mucous membranes.  Pale but clear conjunctivo-.  Neck is supple full range of motion.  Heart is regular rate.  Lungs are clear no wheezing rhonchi or rales.  Abdomen soft positive bowel sounds nontender.  No lower extremity edema no calf tenderness palpation.  Intact distal pulses and capillary for less than 2 seconds.  Awake alert oriented and appropriate.  Assessment and plan 71-year-old female presents to the  emergency department with anemia.  Will do blood work including type and screen.  Will likely require blood transfusion.  Will do rectal exam to look for melena or occult blood although patient does deny.  Will require admission.    Critical Care Time Statement: Upon my evaluation, this patient had a high probability of imminent or life-threatening deterioration due to ANEMIA, which required my direct attention, intervention, and personal management.  I spent a total of 31 minutes directly providing critical care services, including interpretation of complex medical databases, evaluating for the presence of life-threatening injuries or illnesses, management of organ system failure(s) , complex medical decision making (to support/prevent further life-threatening deterioration)., interpretation of hemodynamic data, titration of continuous IV medications (drips), and blood transfusion. This time is exclusive of procedures, teaching, treating other patients, family meetings, and any prior time recorded by providers other than myself.       ED Course         Critical Care Time  Procedures

## 2025-06-06 NOTE — ANESTHESIA PREPROCEDURE EVALUATION
Procedure:  COLONOSCOPY  EGD    Relevant Problems   CARDIO   (+) Hyperlipidemia   (+) Hypertension      ENDO   (+) Type 2 diabetes mellitus with diabetic microalbuminuria, with long-term current use of insulin (HCC)      HEMATOLOGY   (+) Symptomatic anemia        Physical Exam    Airway     Mallampati score: III  TM Distance: <3 FB  Neck ROM: full  Mouth opening: >= 4 cm      Cardiovascular      Dental   No notable dental hx     Pulmonary      Neurological      Other Findings  post-pubertal.      Anesthesia Plan  ASA Score- 2     Anesthesia Type- IV sedation with anesthesia with ASA Monitors.         Additional Monitors:     Airway Plan: natural airway.           Plan Factors-    Chart reviewed.    Patient summary reviewed.                  Induction- intravenous.    Postoperative Plan- .   Monitoring Plan - Monitoring plan - standard ASA monitoring      Perioperative Resuscitation Plan - Level 1 - Full Code.       Informed Consent- Anesthetic plan and risks discussed with patient.  I personally reviewed this patient with the CRNA. Discussed and agreed on the Anesthesia Plan with the CRNA..      NPO Status:  Vitals Value Taken Time   Date of last liquid 06/06/25 06/06/25 12:08   Time of last liquid 0830 06/06/25 12:08   Date of last solid 06/03/25 06/06/25 12:08   Time of last solid

## 2025-06-06 NOTE — ANESTHESIA POSTPROCEDURE EVALUATION
Post-Op Assessment Note    CV Status:  Stable  Pain Score: 0    Pain management: adequate       Mental Status:  Alert and awake   Hydration Status:  Euvolemic   PONV Controlled:  Controlled   Airway Patency:  Patent     Post Op Vitals Reviewed: Yes    No anethesia notable event occurred.    Staff: CRNA           Last Filed PACU Vitals:  Vitals Value Taken Time   Temp 97.5    Pulse 68    /78    Resp 20    SpO2 98

## 2025-06-06 NOTE — ASSESSMENT & PLAN NOTE
Lab Results   Component Value Date    HGBA1C 6.7 (H) 03/24/2025       Recent Labs     06/05/25  1637 06/05/25  2107 06/06/25  0717 06/06/25  1139   POCGLU 109 120 190* 136       Blood Sugar Average: Last 72 hrs:  (P) 130.8Home regimen is metformin, Trulicity, Basaglar 18 units a.m.  She was started in December on Farxiga due to proteinuria, she has not been feeling well, thought that it was secondary to Farxiga and it was stopped recently by PCP  Will make her n.p.o. after midnight, start insulin sliding scale

## 2025-06-06 NOTE — ASSESSMENT & PLAN NOTE
Has had increasing fatigue, generalized weakness, exertional dyspnea, pallor.  PCP ordered routine labs, found to be severely anemic with Hgb 5.3 so referred to ED for admission/work up.  Denies melena, blood in the stool, hematuria, abd pain; denies overt bleeding.  Fairly regular bowel movements  Never had EGD or colonoscopy.  Her mother had gastric cancer.    On Eliquis for history of DVT -- currently held   S/p 2 units PRBCs, Hgb improved from 5.3 to 7.4.  Labs 6/5 Hgb 8.3   Iron severely low -- ordered IV iron   CT chest abdomen pelvis w contrast to rule out malignancy:  with no acute findings   GI consulted, s/p  EGD/colonoscopy on 6/6: Single malignant-appearing mass in the sigmoid colon 35 cm from the anal verge; performed cold forceps biopsy. There was significant narrowing with friability extending a few centimeters distal to the mass that was only traversable with the slim colonoscope.  Tattoo was placed 5 cm distal to the area of narrowing at approximately 30 cm.  The rectum appeared normal.  Firm, mobile, submucosal lesion palpated on external examination to the left of the anus measuring approximately 3 cm  Discussed with gi today pending input from surgery   Pending CEA   Follow up on scope biopsies  Outpatient follow up  with colorectal surgery  Will need risk stratification cardiology - will consult for input optimization and   ac plan if deemed surgical candidate

## 2025-06-06 NOTE — ANESTHESIA POSTPROCEDURE EVALUATION
Post-Op Assessment Note            No anethesia notable event occurred.            Last Filed PACU Vitals:  Vitals Value Taken Time   Temp 98.9 °F (37.2 °C) 06/06/25 13:43   Pulse 67 06/06/25 13:58   /74 06/06/25 13:58   Resp 20 06/06/25 13:58   SpO2 98 % 06/06/25 13:58       Modified Mica:     Vitals Value Taken Time   Activity 2 06/06/25 14:03   Respiration 2 06/06/25 14:03   Circulation 2 06/06/25 14:03   Consciousness 2 06/06/25 14:03   Oxygen Saturation 2 06/06/25 14:03     Modified Mica Score: 10

## 2025-06-06 NOTE — CONSULTS
Consultation - Colorectal   Name: Leanne Jiang 79 y.o. female I MRN: [id number]  Unit/Bed#: -01 I Date of Admission: 6/3/2025   Date of Service: 6/6/2025 I Hospital Day: 3     Inpatient consult to Colorectal Surgery  Consult performed by: Kimmie Bess MD  Consult ordered by: Je Lovett DO      Physician Requesting Evaluation: Francisco Alvarez, *   Reason for Evaluation / Principal Problem: Sigmoid mass    Assessment & Plan  Symptomatic anemia  Colonic mass  79-year-old female found to have sigmoid mass on 6/6 colonoscopy.  Status post 2u pRBC for symptomatic anemia.    AVSS on room   Abdomen soft, nontender, mildly distended    6/4 CT CAP: no evidence of malignancy    6/6 colonoscopy: sigmoid mass with narrowing, traversable only with thin scope.     Plan:   - No acute surgical intervention  - Follow up pending CEA  - Follow-up pending 6/6 cscope biopsies  - Outpatient follow-up with colorectal surgery  - Cardiology risk stratification, optimization, and anticoagulation plan if deemed a surgical candidate  - Remainder of care per primary  History of DVT (deep vein thrombosis)  On eliquis; plan as above    History of Present Illness   Leanne Jiang is a 79 y.o. female with newly found sigmoid mass.  Patient admitted to medicine service with symptomatic anemia, hemoglobin initially 5.5. She is status post 2u pRBC transfusion, and hemoglobin has since stabilized.      She has been tolerating p.o. with overall decreased appetite, continues to pass flatus and have bowel movements without lydia blood.  Patient denies abdominal pain, nausea, vomiting, fever, chills, shortness of breath, chest pain.    Underwent colonoscopy today, which demonstrated sigmoid mass with narrowing, traversable only with thin scope. No previous colonoscopies in the past. CT CAP performed without evidence of malignancy. Additional history of DVT on Eliquis, hysterectomy, no other abdominal surgeries.    Review of Systems  negative unless stated above  Medical History Review: I have reviewed the patient's PMH, PSH, Social History, Family History, Meds, and Allergies   Historical Information   Past Medical History[1]  Past Surgical History[2]  Social History[3]  E-Cigarette/Vaping    E-Cigarette Use Never User      E-Cigarette/Vaping Substances    Nicotine No     THC No     CBD No     Flavoring No     Other No     Unknown No      Family History[4]    Objective :  Temp:  [97.3 °F (36.3 °C)-98.9 °F (37.2 °C)] 97.3 °F (36.3 °C)  HR:  [60-71] 66  BP: (114-187)/(53-89) 120/67  Resp:  [15-20] 16  SpO2:  [96 %-98 %] 98 %  O2 Device: None (Room air)      Physical Exam  Gen: NAD, Resting in bed  Neuro: A&O, No focal deficits  Head: Normal Cephalic, Atraumatic  Eye: EOMI  CV: Regular rate  Pulm: Normal work of breathing, No respiratory distress  Abd: Soft, Non-tender, mildly distended.  Lower midline abdominal surgical scar.  Skin: Warm, Dry, Intact      Lab Results: I have reviewed the following results:  Recent Labs     06/03/25  1810 06/03/25  1917 06/03/25  2135 06/06/25  0527   WBC  --   --    < > 5.11   HGB  --   --    < > 7.9*   HCT  --   --    < > 27.3*   PLT  --   --    < > 310   SODIUM  --   --    < > 139   K  --   --    < > 3.7   CL  --   --    < > 104   CO2  --   --    < > 26   BUN  --   --    < > 10   CREATININE  --   --    < > 0.60   GLUC  --   --    < > 113   PTT  --  32  --   --    INR  --  1.39*  --   --    HSTNI2 4  --   --   --     < > = values in this interval not displayed.     CT chest abdomen pelvis w contrast   Final Result by Jesse Salas MD (06/04 0931)      1.  No acute pathology or evidence of malignancy within the chest, abdomen, or pelvis.   2.  3.0 x 1.7 x 3.1 cm soft tissue density lesion with peripheral calcifications within the left perianal fat is incompletely evaluated on this examination without oral or IV contrast but could reflect sequela of a prior perianal infection. Correlate    with physical  examination.   3.  Chronic findings, detailed above.      The study was marked in EPIC for immediate notification.      Computerized Assisted Algorithm (CAA) may have aided analysis of applicable images.      Workstation performed: [id number]         XR chest 2 views   ED Interpretation by Raquel Rivera DO (06/03 1706)   No acute cardiopulmonary disease.      Final Result by Gregg Younger MD (06/03 2132)      No acute cardiopulmonary disease.            Workstation performed: [id number]           VTE Pharmacologic Prophylaxis: VTE covered by:  [Held by provider] apixaban, Oral     VTE Mechanical Prophylaxis: sequential compression device    ---  Kimmie Bess MD  General Surgery PGY-II         [1]   Past Medical History:  Diagnosis Date    Diabetes (HCC)     Diabetes mellitus (HCC)     Disease of thyroid gland     DVT, bilateral lower limbs (HCC)     Hyperlipidemia     Hypertension     Hypertension     Kidney stones     Renal disorder    [2]   Past Surgical History:  Procedure Laterality Date    HYSTERECTOMY      partial   [3]   Social History  Tobacco Use    Smoking status: Never     Passive exposure: Never    Smokeless tobacco: Never   Vaping Use    Vaping status: Never Used   Substance and Sexual Activity    Alcohol use: Never    Drug use: No    Sexual activity: Not Currently   [4]   Family History  Problem Relation Name Age of Onset    Cancer Family      No Known Problems Mother      No Known Problems Father

## 2025-06-07 VITALS
WEIGHT: 133.82 LBS | SYSTOLIC BLOOD PRESSURE: 124 MMHG | TEMPERATURE: 98.4 F | DIASTOLIC BLOOD PRESSURE: 50 MMHG | HEIGHT: 61 IN | HEART RATE: 67 BPM | RESPIRATION RATE: 16 BRPM | OXYGEN SATURATION: 98 % | BODY MASS INDEX: 25.27 KG/M2

## 2025-06-07 LAB
ALBUMIN SERPL BCG-MCNC: 3.7 G/DL (ref 3.5–5)
ALP SERPL-CCNC: 62 U/L (ref 34–104)
ALT SERPL W P-5'-P-CCNC: 5 U/L (ref 7–52)
ANION GAP SERPL CALCULATED.3IONS-SCNC: 6 MMOL/L (ref 4–13)
AST SERPL W P-5'-P-CCNC: 10 U/L (ref 13–39)
BASOPHILS # BLD AUTO: 0.03 THOUSANDS/ÂΜL (ref 0–0.1)
BASOPHILS NFR BLD AUTO: 0 % (ref 0–1)
BILIRUB SERPL-MCNC: 0.97 MG/DL (ref 0.2–1)
BUN SERPL-MCNC: 12 MG/DL (ref 5–25)
CALCIUM SERPL-MCNC: 9.5 MG/DL (ref 8.4–10.2)
CEA SERPL-MCNC: 59.6 NG/ML (ref 0–3)
CHLORIDE SERPL-SCNC: 106 MMOL/L (ref 96–108)
CHOLEST SERPL-MCNC: 94 MG/DL (ref ?–200)
CO2 SERPL-SCNC: 26 MMOL/L (ref 21–32)
CREAT SERPL-MCNC: 0.6 MG/DL (ref 0.6–1.3)
EOSINOPHIL # BLD AUTO: 0.04 THOUSAND/ÂΜL (ref 0–0.61)
EOSINOPHIL NFR BLD AUTO: 0 % (ref 0–6)
ERYTHROCYTE [DISTWIDTH] IN BLOOD BY AUTOMATED COUNT: 26.9 % (ref 11.6–15.1)
GFR SERPL CREATININE-BSD FRML MDRD: 87 ML/MIN/1.73SQ M
GLUCOSE SERPL-MCNC: 133 MG/DL (ref 65–140)
GLUCOSE SERPL-MCNC: 153 MG/DL (ref 65–140)
HCT VFR BLD AUTO: 28.4 % (ref 34.8–46.1)
HDLC SERPL-MCNC: 39 MG/DL
HGB BLD-MCNC: 8.2 G/DL (ref 11.5–15.4)
IMM GRANULOCYTES # BLD AUTO: 0.04 THOUSAND/UL (ref 0–0.2)
IMM GRANULOCYTES NFR BLD AUTO: 0 % (ref 0–2)
LDLC SERPL CALC-MCNC: 36 MG/DL (ref 0–100)
LYMPHOCYTES # BLD AUTO: 1.74 THOUSANDS/ÂΜL (ref 0.6–4.47)
LYMPHOCYTES NFR BLD AUTO: 18 % (ref 14–44)
MCH RBC QN AUTO: 20.1 PG (ref 26.8–34.3)
MCHC RBC AUTO-ENTMCNC: 28.9 G/DL (ref 31.4–37.4)
MCV RBC AUTO: 70 FL (ref 82–98)
MONOCYTES # BLD AUTO: 0.78 THOUSAND/ÂΜL (ref 0.17–1.22)
MONOCYTES NFR BLD AUTO: 8 % (ref 4–12)
NEUTROPHILS # BLD AUTO: 7.01 THOUSANDS/ÂΜL (ref 1.85–7.62)
NEUTS SEG NFR BLD AUTO: 74 % (ref 43–75)
NRBC BLD AUTO-RTO: 0 /100 WBCS
PLATELET # BLD AUTO: 324 THOUSANDS/UL (ref 149–390)
PMV BLD AUTO: 9.2 FL (ref 8.9–12.7)
POTASSIUM SERPL-SCNC: 3.9 MMOL/L (ref 3.5–5.3)
PROT SERPL-MCNC: 6.6 G/DL (ref 6.4–8.4)
RBC # BLD AUTO: 4.07 MILLION/UL (ref 3.81–5.12)
SODIUM SERPL-SCNC: 138 MMOL/L (ref 135–147)
TRIGL SERPL-MCNC: 93 MG/DL (ref ?–150)
WBC # BLD AUTO: 9.64 THOUSAND/UL (ref 4.31–10.16)

## 2025-06-07 PROCEDURE — 85025 COMPLETE CBC W/AUTO DIFF WBC: CPT | Performed by: NURSE PRACTITIONER

## 2025-06-07 PROCEDURE — 99223 1ST HOSP IP/OBS HIGH 75: CPT | Performed by: INTERNAL MEDICINE

## 2025-06-07 PROCEDURE — 82378 CARCINOEMBRYONIC ANTIGEN: CPT | Performed by: NURSE PRACTITIONER

## 2025-06-07 PROCEDURE — 80053 COMPREHEN METABOLIC PANEL: CPT | Performed by: NURSE PRACTITIONER

## 2025-06-07 PROCEDURE — 82948 REAGENT STRIP/BLOOD GLUCOSE: CPT

## 2025-06-07 PROCEDURE — 80061 LIPID PANEL: CPT | Performed by: NURSE PRACTITIONER

## 2025-06-07 PROCEDURE — 99239 HOSP IP/OBS DSCHRG MGMT >30: CPT | Performed by: NURSE PRACTITIONER

## 2025-06-07 RX ORDER — PROPRANOLOL HCL 20 MG
20 TABLET ORAL EVERY 12 HOURS SCHEDULED
Start: 2025-06-07

## 2025-06-07 RX ORDER — INSULIN GLARGINE 100 [IU]/ML
10 INJECTION, SOLUTION SUBCUTANEOUS
Start: 2025-06-07

## 2025-06-07 RX ADMIN — CYANOCOBALAMIN TAB 500 MCG 1000 MCG: 500 TAB at 08:08

## 2025-06-07 RX ADMIN — Medication 1000 UNITS: at 08:09

## 2025-06-07 NOTE — ASSESSMENT & PLAN NOTE
Has had increasing fatigue, generalized weakness, exertional dyspnea, pallor.  PCP ordered routine labs, found to be severely anemic with Hgb 5.3 so referred to ED for admission/work up.  Denies melena, blood in the stool, hematuria, abd pain; denies overt bleeding.  Fairly regular bowel movements  Never had EGD or colonoscopy.  Her mother had gastric cancer.    On Eliquis for history of DVT -- currently held   S/p 2 units PRBCs, Hgb improved from 5.3 to 7.4.  Labs 6/5 Hgb 8.3   Iron severely low -- ordered IV iron   CT chest abdomen pelvis w contrast to rule out malignancy:  with no acute findings   GI consulted, s/p  EGD/colonoscopy on 6/6: Single malignant-appearing mass in the sigmoid colon 35 cm from the anal verge; performed cold forceps biopsy. There was significant narrowing with friability extending a few centimeters distal to the mass that was only traversable with the slim colonoscope.  Tattoo was placed 5 cm distal to the area of narrowing at approximately 30 cm.  The rectum appeared normal.  Firm, mobile, submucosal lesion palpated on external examination to the left of the anus measuring approximately 3 cm  Discussed with gi today pending input from surgery   CEA 59.6  Follow up on scope biopsies - surgery to call schedule outpt appointment   Will need risk stratification cardiology - will consult for input optimization and   Discussed with cardiology today in regard to clearance for potential procedures up and coming   ac hold for procedure . However, pt had dvt many yrs ago now with likely cancer will be at higher risk once again - discussed with granddaughter and patient   Will continue hold not until sx plan determined based on bx results

## 2025-06-07 NOTE — ASSESSMENT & PLAN NOTE
Lab Results   Component Value Date    HGBA1C 6.7 (H) 03/24/2025       Recent Labs     06/06/25  0717 06/06/25  1139 06/06/25  1624 06/07/25  0728   POCGLU 190* 136 138 153*       Blood Sugar Average: Last 72 hrs:  (P) 133.25Home regimen is metformin, Trulicity, Basaglar 18 units a.m.  She was started in December on Farxiga due to proteinuria, she has not been feeling well, thought that it was secondary to Farxiga and it was stopped recently by PCP  Suspect unlikely cause more likely anemia

## 2025-06-07 NOTE — ASSESSMENT & PLAN NOTE
History of DVT years ago, per son probably 10 years ago, sp long flight (provoked)   Hold Eliquis in setting of above.  However, pt now once again at risk in light of history and now with potential cancer   Would have oncology/ surgical team determine final plan

## 2025-06-07 NOTE — CONSULTS
Consultation - Cardiology Team One  Leanne Jiang 79 y.o. female MRN: [id number]  Unit/Bed#: -01 Encounter: [id number]    Inpatient consult to Cardiology  Consult performed by: LALI Kauffman  Consult ordered by: LALI Li        Physician Requesting Consult: Francisco Alvarez, *  Reason for Consult / Principal Problem:  Pre-op clearance prior to colon resection     Assessment & Plan  Symptomatic anemia  POA: Generalized weakness, exertional dyspnea and increasing fatigue.  Labs per PCP showed severe anemia, hemoglobin 5.3 and hematocrit 21.0.   Status post transfusion of 2 units of packed red blood cell  Hemoglobin hematocrit stabilized at 8.2 and 28.4  Status post EGD colonoscopy (6/6) showed single malignant mass in the sigmoid colon at 35 cm    Hyperlipidemia  Cholesterol 94, triglycerides 93, HDL 39 and LDL 36 (controlled  Home Rx Lipitor 20 mg daily    Hypertension  Blood pressure 120/50, controlled  Home Rx losartan 100 mg daily  Tremor  Home Rx propranolol 20 mg twice daily  Type 2 diabetes mellitus with diabetic microalbuminuria, with long-term current use of insulin ([facility])  Lab Results   Component Value Date    HGBA1C 6.7 (H) 03/24/2025       Recent Labs     06/06/25  0717 06/06/25  1139 06/06/25  1624 06/07/25  0728   POCGLU 190* 136 138 153*       Blood Sugar Average: Last 72 hrs:  (P) 133.25    History of DVT (deep vein thrombosis)  Remote history of DVT 10 years ago  Anticoagulated with Eliquis  Iron deficiency  IV Venofer for while inpatient  Colonic mass  Colonoscopy (6/6):  Malignant mass in the sigmoid colon, s/p bx.     CT abdomen:    Surgery consulted for possible resection.               Plan/Recommendations:  0.3% M ICA risk by Freeman perioperative risk stratification  She has no complaints of chest pain, palpitations, lightheadedness, dizziness or syncope.  EKG normal sinus rhythm without signs of ischemia.  Initial weakness and dyspnea have subsided after  transfusion.  Blood pressure, hyperlipidemia and diabetes are well-controlled.  No need for further cardiac evaluation prior to colon resection        ______________________________________________________________________________________    CC: Colon mass      History of Present Illness   HPI: Leanne Jiang is a 79 y.o. year old female who has a  past medical history of hypertension, hyperlipidemia, DM2 (hgb , presented to the emergency room at [facility] for evaluation of generalized weakness, poor oral intake and exertional dyspnea.    She cares for her son who recently moved  in with her following a stroke and CKD V.   She denies chest pain, palpitations, lightheadedness or dizziness.  She does not follow with a cardiologist on the outpatient basis.    She was seen in PCP office and initial lab studies showed Hgb 5.5 and Hct 21.0.   She was subsequently sent to ER for further evaluation.  She does not follow with a cardiologist on the outpatient basis.               EKG reviewed personally: Normal sinus rhythm rate 80 bpm              Review of Systems   Constitutional: Negative. Negative for chills and fever.   HENT: Negative.  Negative for congestion.    Eyes: Negative.    Cardiovascular: Negative.  Negative for chest pain and dyspnea on exertion.   Respiratory: Negative.  Negative for shortness of breath and wheezing.    Endocrine: Negative.    Hematologic/Lymphatic: Negative.    Skin: Negative.    Musculoskeletal: Negative.      Historical Information   Past Medical History[1]  Past Surgical History[2]  Social History     Substance and Sexual Activity   Alcohol Use Never     Social History     Substance and Sexual Activity   Drug Use No     Tobacco Use History[3]  Family History: Family history non-contributory    Meds/Allergies   all current active meds have been reviewed       Allergies[4]    Objective   Vitals: Blood pressure 124/50, pulse 67, temperature 98.4 °F (36.9 °C), resp. rate 16, height 5'  "1\" (1.549 m), weight 60.7 kg (133 lb 13.1 oz), SpO2 98%.,     Body mass index is 25.28 kg/m².,     Systolic (24hrs), Av , Min:114 , Max:187     Diastolic (24hrs), Av, Min:50, Max:89    Wt Readings from Last 3 Encounters:   25 60.7 kg (133 lb 13.1 oz)   25 64 kg (141 lb)   24 66.8 kg (147 lb 3.2 oz)      Lab Results   Component Value Date    CREATININE 0.60 2025    CREATININE 0.60 2025    CREATININE 0.61 2025         Intake/Output Summary (Last 24 hours) at 2025 0749  Last data filed at 2025 1846  Gross per 24 hour   Intake 1180 ml   Output --   Net 1180 ml     Weight (last 2 days)       None          Invasive Devices       Peripheral Intravenous Line  Duration             Peripheral IV 25 Right Forearm 3 days    Peripheral IV 25 Left Hand <1 day                      Physical Exam  Constitutional:       Appearance: Normal appearance.   HENT:      Head: Normocephalic.      Right Ear: Tympanic membrane normal.      Nose: Nose normal.      Mouth/Throat:      Mouth: Mucous membranes are moist.     Eyes:      Conjunctiva/sclera: Conjunctivae normal.       Cardiovascular:      Rate and Rhythm: Normal rate and regular rhythm.      Pulses: Normal pulses.      Heart sounds: Normal heart sounds. No murmur heard.     No friction rub. No gallop.   Pulmonary:      Breath sounds: No wheezing or rales.   Abdominal:      General: There is no distension.      Palpations: Abdomen is soft.     Musculoskeletal:      Cervical back: Neck supple.      Right lower leg: No edema.      Left lower leg: No edema.     Skin:     General: Skin is warm.     Neurological:      General: No focal deficit present.      Mental Status: She is alert.     Psychiatric:         Mood and Affect: Mood normal.         Behavior: Behavior normal.         Thought Content: Thought content normal.         Judgment: Judgment normal.           LABORATORY RESULTS:      CBC with diff:   Results from last  " "days   Lab Units 06/07/25  0526 06/06/25  0527 06/05/25  0530 06/04/25 0446 06/03/25 2135 06/03/25 1649 06/03/25  1330   WBC Thousand/uL 9.64 5.11 5.70 4.86  --  5.71 5.46   HEMOGLOBIN g/dL 8.2* 7.9* 8.3* 7.4* 7.3* 5.3* 5.5*   HEMATOCRIT % 28.4* 27.3* 28.7* 26.8* 25.5* 20.6* 21.0*   MCV fL 70* 68* 68* 70*  --  64* 64*   PLATELETS Thousands/uL 324 310 325 283  --  325 344   RBC Million/uL 4.07 3.99 4.24 3.83  --  3.21* 3.27*   MCH pg 20.1* 19.8* 19.6* 19.3*  --  16.5* 16.8*   MCHC g/dL 28.9* 28.9* 28.9* 27.6*  --  25.7* 26.2*   RDW % 26.9* 25.3* 24.5* 24.3*  --  19.2* 19.1*   MPV fL 9.2 9.3 9.1 9.4  --  9.3 9.5   NRBC AUTO /100 WBCs 0 0 0  --   --  0 0       CMP:  Results from last 7 days   Lab Units 06/07/25  0526 06/06/25  0527 06/05/25 0530 06/04/25 0446 06/03/25 1649 06/03/25  1330   POTASSIUM mmol/L 3.9 3.7 4.0 3.8 4.4 5.0   CHLORIDE mmol/L 106 104 103 105 105 105   CO2 mmol/L 26 26 24 23 23 26   BUN mg/dL 12 10 11 18 22 20   CREATININE mg/dL 0.60 0.60 0.61 0.55* 0.72 0.72   CALCIUM mg/dL 9.5 9.7 10.4* 9.3 9.8 9.9   AST U/L 10*  --   --   --   --  14   ALT U/L 5*  --   --   --   --  6*   ALK PHOS U/L 62  --   --   --   --  62   EGFR ml/min/1.73sq m 87 87 87 90 80 80       BMP:  Results from last 7 days   Lab Units 06/07/25  0526 06/06/25  0527 06/05/25  0530 06/04/25  0446 06/03/25  1649 06/03/25  1330   POTASSIUM mmol/L 3.9 3.7 4.0 3.8 4.4 5.0   CHLORIDE mmol/L 106 104 103 105 105 105   CO2 mmol/L 26 26 24 23 23 26   BUN mg/dL 12 10 11 18 22 20   CREATININE mg/dL 0.60 0.60 0.61 0.55* 0.72 0.72   CALCIUM mg/dL 9.5 9.7 10.4* 9.3 9.8 9.9          No results found for: \"NTBNP\"                       Results from last 7 days   Lab Units 06/03/25  1649   TSH 3RD GENERATON uIU/mL 0.982       Results from last 7 days   Lab Units 06/03/25  1917   INR  1.39*     Lipid Profile:   Lab Results   Component Value Date    CHOL 119 03/26/2015    CHOL 122 12/23/2014    CHOL 129 08/09/2014     Lab Results   Component Value " Date    HDL 39 (L) 06/07/2025    HDL 38 (L) 08/26/2024    HDL 44 (L) 02/22/2024     Lab Results   Component Value Date    LDLCALC 36 06/07/2025    LDLCALC 52 08/26/2024    LDLCALC 48 02/22/2024     Lab Results   Component Value Date    TRIG 93 06/07/2025    TRIG 142 08/26/2024    TRIG 154 (H) 02/22/2024       Imaging: Results Review Statement: No pertinent imaging studies reviewed.  Colonoscopy  Result Date: 6/6/2025  Narrative: Table formatting from the original result was not included. [facility] [address] [facility] [address] DATE OF SERVICE: 6/06/25 PHYSICIAN(S): Attending: Hattie East DO Fellow: Je Lovett DO INDICATION: Symptomatic anemia POST-OP DIAGNOSIS: See the impression below. HISTORY: Prior colonoscopy: No prior colonoscopy. BOWEL PREPARATION: Miralax/Dulcolax; Two-day Bowel Prep PREPROCEDURE: Informed consent was obtained for the procedure, including sedation. Risks including but not limited to bleeding, infection, perforation, adverse drug reaction and aspiration were explained in detail. Also explained about less than 100% sensitivity with the exam and other alternatives. The patient was placed in the left lateral decubitus position. Procedure: Colonoscopy DETAILS OF PROCEDURE: Patient was taken to the procedure room where a time out was performed to confirm correct patient and correct procedure. The patient underwent monitored anesthesia care, which was administered by an anesthesia professional. The patient's blood pressure, ECG, ETCO2, heart rate, level of consciousness, oxygen and respirations were monitored throughout the procedure. A digital rectal exam was performed. The scope was introduced through the anus and advanced to the sigmoid colon. Retroflexion was performed in the rectum. The quality of bowel preparation was evaluated using the [place] Bowel Preparation Scale with scores of: right colon = 2, transverse colon = 2, left colon = 2. The  total BBPS score was 6. Bowel prep was adequate. The patient experienced no blood loss. The procedure was not difficult. The patient tolerated the procedure well. There were no apparent adverse events. ANESTHESIA INFORMATION: ASA: II Anesthesia Type: IV Sedation with Anesthesia MEDICATIONS: No administrations occurring from 1243 to 1340 on 06/06/25 FINDINGS: Single malignant-appearing mass in the sigmoid colon 35 cm from the anal verge; performed cold forceps biopsy. There was significant narrowing with friability extending a few centimeters distal to the mass that was only traversable with the slim colonoscope.  Tattoo was placed 5 cm distal to the area of narrowing at approximately 30 cm. The rectum appeared normal. Firm, mobile, submucosal lesion palpated on external examination to the left of the anus measuring approximately 3 cm EVENTS: Procedure Events Event Event Time ENDO SCOPE OUT TIME 6/6/2025  1:08 PM ENDO SCOPE OUT TIME 6/6/2025  1:21 PM ENDO SCOPE OUT TIME 6/6/2025  1:38 PM SPECIMENS: ID Type Source Tests Collected by Time Destination 1 : r/o celiac Tissue Duodenum TISSUE EXAM Je Lovett  6/6/2025  1:04 PM  2 : r/o h. pylori Tissue Stomach TISSUE EXAM Je Lovett  6/6/2025  1:05 PM  3 : Sigmoid colon mass bx @35 Tissue Large Intestine, Sigmoid Colon TISSUE EXAM Je Lovett  6/6/2025  1:26 PM  EQUIPMENT: Colonoscope -[id number] Colonoscope - 2223     Impression: Single malignant-appearing mass in the sigmoid colon 35 cm from the anal verge; performed cold forceps biopsy,  tattooed distal to this site The rectum appeared normal. Firm, mobile, submucosal lesion palpated on external examination to the left of the anus measuring approximately 3 cm RECOMMENDATION: Await pathology results Repeat colonoscopy in 1 year, due: 6/6/2026 Personal history of colon cancer  Obtain CEA Consult colorectal surgery    Hattie East DO     EGD  Result Date: 6/6/2025  Narrative: Table  formatting from the original result was not included. [facility] [address] [facility] [address] DATE OF SERVICE: 6/06/25 PHYSICIAN(S): Attending: Hattie East DO Fellow: Je Lovett DO INDICATION: Symptomatic anemia POST-OP DIAGNOSIS: See the impression below. PREPROCEDURE: Informed consent was obtained for the procedure, including sedation.  Risks of perforation, hemorrhage, adverse drug reaction and aspiration were discussed. The patient was placed in the left lateral decubitus position. Patient was explained about the risks and benefits of the procedure. Risks including but not limited to bleeding, infection, and perforation were explained in detail. Also explained about less than 100% sensitivity with the exam and other alternatives. PROCEDURE: EGD DETAILS OF PROCEDURE: Patient was taken to the procedure room where a time out was performed to confirm correct patient and correct procedure. The patient underwent monitored anesthesia care, which was administered by an anesthesia professional. The patient's blood pressure, heart rate, level of consciousness, respirations, oxygen, ECG and ETCO2 were monitored throughout the procedure. The scope was introduced through the mouth and advanced to the second part of the duodenum. Retroflexion was performed in the fundus. The patient experienced no blood loss. The procedure was not difficult. The patient tolerated the procedure well. There were no apparent adverse events. ANESTHESIA INFORMATION: ASA: II Anesthesia Type: IV Sedation with Anesthesia MEDICATIONS: No administrations occurring from 1243 to 1309 on 06/06/25 FINDINGS: The upper third of the esophagus and middle third of the esophagus appeared normal. Non-obstructing Schatzki ring in the lower third of the esophagus Irregular Z-line 39 cm from the incisors The cardia and fundus of the stomach appeared normal. Mosaic mucosa in the body of the stomach Erythematous mucosa  in the antrum Performed forceps biopsies in the stomach to rule out H. pylori The duodenal bulb, 1st part of the duodenum and 2nd part of the duodenum appeared normal. Performed random biopsy using biopsy forceps to rule out celiac disease. SPECIMENS: ID Type Source Tests Collected by Time Destination 1 : r/o celiac Tissue Duodenum TISSUE EXAM Je Lovett DO 6/6/2025  1:04 PM  2 : r/o h. pylori Tissue Stomach TISSUE EXAM Je Lovett,  6/6/2025  1:05 PM      Impression: The upper third of the esophagus and middle third of the esophagus appeared normal. Non-obstructing Schatzki ring in the lower third of the esophagus The cardia and fundus of the stomach appeared normal. Mosaic mucosa in the body of the stomach Erythematous mucosa in the antrum Performed forceps biopsies in the stomach to rule out H. pylori The duodenal bulb, 1st part of the duodenum and 2nd part of the duodenum appeared normal. Performed random biopsy to rule out celiac disease. RECOMMENDATION: Await pathology results Proceed with colonoscopy   Hattie East DO     CT chest abdomen pelvis w contrast  Result Date: 6/4/2025  Narrative: CT CHEST, ABDOMEN AND PELVIS WITH IV CONTRAST INDICATION: hgb of 5.3, rule out malignancy. COMPARISON: None. TECHNIQUE: CT examination of the chest, abdomen and pelvis was performed. Multiplanar 2D reformatted images were created from the source data. This examination, like all CT scans performed in the [facility], was performed utilizing techniques to minimize radiation dose exposure, including the use of iterative reconstruction and automated exposure control. Radiation dose length product (DLP) for this visit: 749.94 mGy-cm. IV Contrast: 75 mL of iohexol (OMNIPAQUE) Enteric Contrast: Not administered. FINDINGS: CHEST LUNGS: There is no suspicious pulmonary nodule or mass. 3 mm subpleural right lower lobe pulmonary nodule is most likely benign (301:73). No tracheal or endobronchial  lesion. PLEURA: Unremarkable. HEART/GREAT VESSELS: Mild cardiomegaly. No thoracic aortic aneurysm. Coronary artery and mitral annular calcifications. MEDIASTINUM AND BETI: Unremarkable. CHEST WALL AND LOWER NECK: Approximately 5 mm hypodense left thyroid nodule. Incidental discovery of one or more thyroid nodule(s) measuring less than 1.5 cm and without suspicious features is noted in this patient who is above 35 years old; according to  guidelines published in the February 2015 white paper on incidental thyroid nodules in the Journal of the American College of Radiology (JACR), no further evaluation is recommended. ABDOMEN LIVER/BILIARY TREE: Unremarkable. GALLBLADDER: Extensive cholelithiasis, but without findings of acute cholecystitis. SPLEEN: Unremarkable. PANCREAS: Unremarkable. ADRENAL GLANDS: Unremarkable. KIDNEYS/URETERS: No hydronephrosis. 1.5 cm nonobstructing calculus within the upper pole the right kidney. 2 mm nonobstructing calculus within the upper pole of the left kidney. Subcentimeter hypoattenuating renal lesion(s), too small to characterize but  statistically likely benign, which do not warrant follow-up (Radiology June 2019). STOMACH AND BOWEL: Small hiatal hernia. Normal caliber small bowel. Moderate colonic stool burden. There is a 3.0 x 1.7 x 3.1 cm soft tissue density lesion with peripheral calcifications within the left perianal fat which is incompletely evaluated on this examination without oral or IV contrast but could reflect sequela of a prior perianal infection. APPENDIX: No findings to suggest appendicitis. ABDOMINOPELVIC CAVITY: No ascites. No pneumoperitoneum. No lymphadenopathy. VESSELS: Unremarkable for patient's age. PELVIS REPRODUCTIVE ORGANS: Post hysterectomy. URINARY BLADDER: Unremarkable. ABDOMINAL WALL/INGUINAL REGIONS: Unremarkable. BONES: No acute fracture or suspicious osseous lesion. Generalized osteopenia. Multilevel spinal degenerative changes.     Impression: 1.  No  acute pathology or evidence of malignancy within the chest, abdomen, or pelvis. 2.  3.0 x 1.7 x 3.1 cm soft tissue density lesion with peripheral calcifications within the left perianal fat is incompletely evaluated on this examination without oral or IV contrast but could reflect sequela of a prior perianal infection. Correlate with physical examination. 3.  Chronic findings, detailed above. The study was marked in EPIC for immediate notification. Computerized Assisted Algorithm (CAA) may have aided analysis of applicable images. Workstation performed: [id number]     XR chest 2 views  Result Date: 6/3/2025  Narrative: XR CHEST PA AND LATERAL INDICATION: Fatigue, exertional SOB. COMPARISON: 3/24/2025 FINDINGS: Clear lungs. No pneumothorax or pleural effusion. Normal cardiomediastinal silhouette. Mitral annular calcification. Age-related degenerative changes in the spine. Normal upper abdomen.     Impression: No acute cardiopulmonary disease. Workstation performed: [id number]         Counseling / Coordination of Care  Total floor / unit time spent today 45 minutes.  Greater than 50% of total time was spent with the patient and / or family counseling and / or coordination of care.  A description of the counseling / coordination of care: Review of history, current assessment, development of a plan.      Code Status: Level 1 - Full Code    ** Please Note: Dragon 360 Dictation voice to text software may have been used in the creation of this document. **         [1]   Past Medical History:  Diagnosis Date    Diabetes (HCC)     Diabetes mellitus (HCC)     Disease of thyroid gland     DVT, bilateral lower limbs (HCC)     Hyperlipidemia     Hypertension     Hypertension     Kidney stones     Renal disorder    [2]   Past Surgical History:  Procedure Laterality Date    HYSTERECTOMY      partial   [3]   Social History  Tobacco Use   Smoking Status Never    Passive exposure: Never   Smokeless Tobacco Never   [4]    Allergies  Allergen Reactions    Sulfa Antibiotics Rash

## 2025-06-07 NOTE — DISCHARGE SUMMARY
Discharge Summary - Hospitalist   Name: Leanne Jiang 79 y.o. female I MRN: [id number]  Unit/Bed#: -01 I Date of Admission: 6/3/2025   Date of Service: 6/7/2025 I Hospital Day: 4     Assessment & Plan  Symptomatic anemia  Has had increasing fatigue, generalized weakness, exertional dyspnea, pallor.  PCP ordered routine labs, found to be severely anemic with Hgb 5.3 so referred to ED for admission/work up.  Denies melena, blood in the stool, hematuria, abd pain; denies overt bleeding.  Fairly regular bowel movements  Never had EGD or colonoscopy.  Her mother had gastric cancer.    On Eliquis for history of DVT -- currently held   S/p 2 units PRBCs, Hgb improved from 5.3 to 7.4.  Labs 6/5 Hgb 8.3   Iron severely low -- ordered IV iron   CT chest abdomen pelvis w contrast to rule out malignancy:  with no acute findings   GI consulted, s/p  EGD/colonoscopy on 6/6: Single malignant-appearing mass in the sigmoid colon 35 cm from the anal verge; performed cold forceps biopsy. There was significant narrowing with friability extending a few centimeters distal to the mass that was only traversable with the slim colonoscope.  Tattoo was placed 5 cm distal to the area of narrowing at approximately 30 cm.  The rectum appeared normal.  Firm, mobile, submucosal lesion palpated on external examination to the left of the anus measuring approximately 3 cm  Discussed with gi today pending input from surgery   CEA 59.6  Follow up on scope biopsies - surgery to call schedule outpt appointment   Will need risk stratification cardiology - will consult for input optimization and   Discussed with cardiology today in regard to clearance for potential procedures up and coming   ac hold for procedure . However, pt had dvt many yrs ago now with likely cancer will be at higher risk once again - discussed with granddaughter and patient   Will continue hold not until sx plan determined based on bx results     Colonic mass  Noted on  colonoscopy 6/6:Single malignant-appearing mass in the sigmoid colon 35 cm from the anal verge; performed cold forceps biopsy. There was significant narrowing with friability extending a few centimeters distal to the mass that was only traversable with the slim colonoscope.  Tattoo was placed 5 cm distal to the area of narrowing at approximately 30 cm.The rectum appeared normal.Firm, mobile, submucosal lesion palpated on external examination to the left of the anus measuring approximately 3 cm  Pt will need fu in one year for colonoscopy   Currently pending bx for final plan per colorectal surgery who will fu outpt   History of DVT (deep vein thrombosis)  History of DVT years ago, per son probably 10 years ago, sp long flight (provoked)   Hold Eliquis in setting of above.  However, pt now once again at risk in light of history and now with potential cancer   Would have oncology/ surgical team determine final plan   Iron deficiency  Noted severe iron deficiency, ordered IV Venofer while IP   Hyperlipidemia  Continue Lipitor 20 mg daily  Hypertension  Continue losartan 100 mg daily with holding parameter SBP<130  Tremor  Continue propranolol  Type 2 diabetes mellitus with diabetic microalbuminuria, with long-term current use of insulin ([facility])  Lab Results   Component Value Date    HGBA1C 6.7 (H) 03/24/2025       Recent Labs     06/06/25  0717 06/06/25  1139 06/06/25  1624 06/07/25  0728   POCGLU 190* 136 138 153*       Blood Sugar Average: Last 72 hrs:  (P) 133.25Home regimen is metformin, Trulicity, Basaglar 18 units a.m.  She was started in December on Farxiga due to proteinuria, she has not been feeling well, thought that it was secondary to Farxiga and it was stopped recently by PCP  Suspect unlikely cause more likely anemia   Family history of gastric cancer  Mother with hx of   Vitamin D deficiency  Continue vitamin D  Proteinuria  Follows nephrology     Medical Problems       Resolved Problems  Date Reviewed:  6/3/2025   None       Discharging Physician / Practitioner: LALI Li  PCP: Garth Garcia MD  Admission Date:   Admission Orders (From admission, onward)       Ordered        06/03/25 1754  INPATIENT ADMISSION  Once                          Discharge Date: 06/07/25    Next Steps for Physician/AP Assuming Care:  Call to schedule follow-up with PCP within the next week  Recommend CBC with differential in 1 week results to PCP  Continue follow-up with nephrology  Patient was seen by cardiology for surgical clearance noting unremarkable EKG with no concerning cardiac symptoms.  Recommended she be considered low risk for upcoming surgical procedure.    Test Results Pending at Discharge (will require follow up):  Biopsy results from colonoscopy     Medication Changes for Discharge & Rationale:  Hold eliquis now for potential surgery if not sooner would consider resumption in setting of hx and potential cancer   See after visit summary for reconciled discharge medications provided to patient and/or family.     Consultations During Hospital Stay:  Dr. East -GI  Colorectal surgery/resident Maria Alejandra - follow up outpatient    Procedures Performed:   CT chest abdomen and pelvis with contrast IV.  6/4/2025:1.  No acute pathology or evidence of malignancy within the chest, abdomen, or pelvis.   2.  3.0 x 1.7 x 3.1 cm soft tissue density lesion with peripheral calcifications within the left perianal fat is incompletely evaluated on this examination without oral or IV contrast but could reflect sequela of a prior perianal infection. Correlate   with physical examination.   3.  Chronic findings, detailed above.   CEA 6/7/2025: 59.6  Hemoglobin/hematocrit on day of discharge 6/7/2025 8.2 and 28.4  Colonoscopy on 6/5/2025:Single malignant-appearing mass in the sigmoid colon 35 cm from the anal verge; performed cold forceps biopsy. There was significant narrowing with friability extending a few centimeters distal to the mass  that was only traversable with the slim colonoscope.  Tattoo was placed 5 cm distal to the area of narrowing at approximately 30 cm.  The rectum appeared normal.  Firm, mobile, submucosal lesion palpated on external examination to the left of the anus measuring approximately 3 cm      Significant Findings / Test Results:   See above     Incidental Findings:   None        Hospital Course:   Leanne Jiang is a 79 y.o. female patient who originally presented to the hospital on 6/3/2025 due to abnormal labs.  Patient has past medical history of hypertension, hyperlipidemia insulin-dependent diabetes, proteinuria and a tremor.  Patient presented to [facility] with abnormal labs.  Information was gathered from patient's son and patient at bedside.  Patient reports having felt generalized weakness for the past few months poor oral intake and exertional dyspnea.  Patient reported that she had been under a lot of stress lately her son moved in with her and her son had a stroke and is now end-stage 5 kidney disease supposed to start dialysis.  In December patient reports starting Farixiga, and was diagnosed with a upper respiratory tract infection.  Patient was placed on oral antibiotics.  She thought she was not feeling well because of the Farixiga and stopped taking the medication a while ago.  She denied any chest pain or palpitation she reported she did have exertional dyspnea she had generalized weakness denied any blood in her stool.  She reported having fairly normal bowel movements occasional constipation for which she occasionally took a stool softener.  She reported she had never had a colonoscopy and a strong family history of colon cancer.  She was seen by her primary care physician and all labs and her hemoglobin was noted to be 5.1 she was sent to the emergency room for further evaluation.  She was admitted and seen by GI team.  Patient was previously placed on Eliquis for history of DVT over 10 years  ago, suspected to be provoked status post long airflight.  Patient received 2 units of packed red blood cells.  An IV and infusion.  Patient was agreeable to undergoing EGD and colonoscopy.  Eliquis was held in preparation for procedures and patient was prepped.  Post colonoscopy it was noted that patient had a single malignant appearing mass in the sigmoid colon measuring 35 cm from the anal verge at which point a cold forcep biopsy was performed.  It was also noted that she had significant narrowing with friability extended to a few centimeters distal to the mass there was any traversable with Slim colonoscope.  A tattoo was placed at 5 cm distal to the area of narrowing at approximately 30 cm.  The rectum appeared normal there was a firm mobile submucosal lesion palpated on external examination to the left of the anus measuring approximately 3 cm.  Colorectal surgery were consulted.  Patient was seen by colorectal surgery noting nontender mildly distended soft abdomen.  They reported there would be no acute surgical intervention at this time.  A CEA was performed that was elevated and patient was recommended to follow-up pending her 6/6/2025 colonoscopy and biopsies.  All were agreeable for patient to return home.  Patient will call to schedule follow-up with PCP within the next week.  Would recommend PCP follow-up with monitoring patient's Eliquis was held for potential surgical procedure soon after discharge.  However I have recommended that patient follow-up with surgery and heme-onc in regards to future plan for Eliquis.  If this proves to be cancer patient may require to be on long-term Eliquis.      Please see above list of diagnoses and related plan for additional information.     Discharge Day Visit / Exam:   Subjective: Extensive conversation had with patient at bedside.  She reports no further discomfort at this time no dizziness lightheadedness no chest pain or palpitations.  Discussed her blood  "thinner moving forward and we also discussed her follow-up appointments in process after biopsy returns.  Patient really eager to leave as she is wanting to attend her great granddaughters birthday party today  Vitals: Blood Pressure: 124/50 (06/07/25 0729)  Pulse: 67 (06/07/25 0729)  Temperature: 98.4 °F (36.9 °C) (06/07/25 0729)  Temp Source: Oral (06/06/25 2137)  Respirations: 16 (06/07/25 0729)  Height: 5' 1\" (154.9 cm) (06/03/25 2201)  Weight - Scale: 60.7 kg (133 lb 13.1 oz) (06/03/25 2201)  SpO2: 98 % (06/07/25 0729)  Physical Exam  Constitutional:       General: She is not in acute distress.     Appearance: She is obese. She is not ill-appearing, toxic-appearing or diaphoretic.   HENT:      Head: Normocephalic and atraumatic.      Ears:      Comments: Hard of hearing     Nose: No congestion.     Eyes:      General:         Right eye: No discharge.         Left eye: No discharge.       Cardiovascular:      Rate and Rhythm: Normal rate.      Heart sounds: No murmur heard.     No friction rub. No gallop.   Pulmonary:      Effort: No respiratory distress.      Breath sounds: No stridor. No wheezing, rhonchi or rales.   Chest:      Chest wall: No tenderness.   Abdominal:      General: There is no distension.      Palpations: Abdomen is soft. There is no mass.      Tenderness: There is no abdominal tenderness. There is no guarding or rebound.      Hernia: No hernia is present.     Musculoskeletal:         General: No swelling, tenderness, deformity or signs of injury.      Right lower leg: No edema.      Left lower leg: No edema.     Skin:     Coloration: Skin is not jaundiced or pale.      Findings: No bruising, erythema, lesion or rash.     Neurological:      Mental Status: She is alert and oriented to person, place, and time.     Psychiatric:         Behavior: Behavior normal.          Discussion with Family: Updated  (granddaughter updated) via phone.    Discharge instructions/Information to " patient and family:   See after visit summary for information provided to patient and family.      Provisions for Follow-Up Care:  See after visit summary for information related to follow-up care and any pertinent home health orders.      Mobility at time of Discharge:   Basic Mobility Inpatient Raw Score: 24  JH-HLM Goal: 8: Walk 250 feet or more  JH-HLM Achieved: 8: Walk 250 feet ot more  HLM Goal achieved. Continue to encourage appropriate mobility.     Disposition:   Home    Planned Readmission: No plan for readmission    Administrative Statements   Discharge Statement:  I have spent a total time of 56 minutes in caring for this patient on the day of the visit/encounter. >30 minutes of time was spent on: Patient and family education, Importance of tx compliance, Risk factor reductions, Counseling / Coordination of care, Documenting in the medical record, Reviewing / ordering tests, medicine, procedures  , and Communicating with other healthcare professionals .    **Please Note: This note may have been constructed using a voice recognition system**

## 2025-06-07 NOTE — ASSESSMENT & PLAN NOTE
Noted on colonoscopy 6/6:Single malignant-appearing mass in the sigmoid colon 35 cm from the anal verge; performed cold forceps biopsy. There was significant narrowing with friability extending a few centimeters distal to the mass that was only traversable with the slim colonoscope.  Tattoo was placed 5 cm distal to the area of narrowing at approximately 30 cm.The rectum appeared normal.Firm, mobile, submucosal lesion palpated on external examination to the left of the anus measuring approximately 3 cm  Pt will need fu in one year for colonoscopy   Currently pending bx for final plan per colorectal surgery who will fu outpt

## 2025-06-07 NOTE — ASSESSMENT & PLAN NOTE
POA: Generalized weakness, exertional dyspnea and increasing fatigue.  Labs per PCP showed severe anemia, hemoglobin 5.3 and hematocrit 21.0.   Status post transfusion of 2 units of packed red blood cell  Hemoglobin hematocrit stabilized at 8.2 and 28.4  Status post EGD colonoscopy (6/6) showed single malignant mass in the sigmoid colon at 35 cm

## 2025-06-07 NOTE — ASSESSMENT & PLAN NOTE
Noted on colonoscopy 6/6:Single malignant-appearing mass in the sigmoid colon 35 cm from the anal verge; performed cold forceps biopsy. There was significant narrowing with friability extending a few centimeters distal to the mass that was only traversable with the slim colonoscope.  Tattoo was placed 5 cm distal to the area of narrowing at approximately 30 cm.The rectum appeared normal.Firm, mobile, submucosal lesion palpated on external examination to the left of the anus measuring approximately 3 cm

## 2025-06-07 NOTE — ASSESSMENT & PLAN NOTE
Colonoscopy (6/6):  Malignant mass in the sigmoid colon, s/p bx.     CT abdomen:    Surgery consulted for possible resection.

## 2025-06-07 NOTE — ASSESSMENT & PLAN NOTE
Lab Results   Component Value Date    HGBA1C 6.7 (H) 03/24/2025       Recent Labs     06/06/25  0717 06/06/25  1139 06/06/25  1624 06/07/25  0728   POCGLU 190* 136 138 153*       Blood Sugar Average: Last 72 hrs:  (P) 133.25

## 2025-06-09 ENCOUNTER — TELEPHONE (OUTPATIENT)
Age: 80
End: 2025-06-09

## 2025-06-09 NOTE — TELEPHONE ENCOUNTER
Called patients granddaughter Rebecca and went over the following information:    Patients last blood work for the kidney function was totally normal that was done about a week ago. They absolutely can reschedule whenever they feel the time is right so there is no problem with that.     Rebecca verbally understood and stated she will call back when this is more under control.     I have canceled the appointment.

## 2025-06-09 NOTE — TELEPHONE ENCOUNTER
Patients granddaughter calling in for patient to relay that patient was dx with colon cancer on Friday- reports they are currently waiting for the cancer to be staged before they can move forward with surgical intervention. Rebecca is questioning if patients 7/10 follow up appointment is a necessity or if patient could reschedule and be seen at a later date? States they are trying to go through and open up patients schedule for the rigorous amount of testing/ appointments she will need to go to w/ new cancer diagnosis. Rebecca is questioning if the 7/10 appointment is a necessity and provider feels she needs to be seen if it could be changed to a virtual appointment? Please advise, Rebecca is requesting a callback stating patient is hard of hearing. Thank you.

## 2025-06-10 RX ORDER — DOXYCYCLINE 100 MG/1
1 CAPSULE ORAL 2 TIMES DAILY
COMMUNITY
Start: 2025-03-18

## 2025-06-11 ENCOUNTER — OFFICE VISIT (OUTPATIENT)
Age: 80
End: 2025-06-11
Payer: MEDICARE

## 2025-06-11 ENCOUNTER — LAB REQUISITION (OUTPATIENT)
Dept: LAB | Facility: HOSPITAL | Age: 80
End: 2025-06-11
Payer: MEDICARE

## 2025-06-11 ENCOUNTER — RESULTS FOLLOW-UP (OUTPATIENT)
Dept: GASTROENTEROLOGY | Facility: CLINIC | Age: 80
End: 2025-06-11

## 2025-06-11 ENCOUNTER — DOCUMENTATION (OUTPATIENT)
Dept: HEMATOLOGY ONCOLOGY | Facility: CLINIC | Age: 80
End: 2025-06-11

## 2025-06-11 VITALS — WEIGHT: 130 LBS | HEIGHT: 61 IN | BODY MASS INDEX: 24.55 KG/M2

## 2025-06-11 DIAGNOSIS — D64.9 ANEMIA, UNSPECIFIED: ICD-10-CM

## 2025-06-11 DIAGNOSIS — E11.9 TYPE 2 DIABETES MELLITUS WITHOUT COMPLICATIONS (HCC): ICD-10-CM

## 2025-06-11 DIAGNOSIS — C18.6 MALIGNANT NEOPLASM OF DESCENDING COLON (HCC): Primary | ICD-10-CM

## 2025-06-11 LAB
BASOPHILS # BLD AUTO: 0.03 THOUSANDS/ÂΜL (ref 0–0.1)
BASOPHILS NFR BLD AUTO: 1 % (ref 0–1)
EOSINOPHIL # BLD AUTO: 0.13 THOUSAND/ÂΜL (ref 0–0.61)
EOSINOPHIL NFR BLD AUTO: 3 % (ref 0–6)
ERYTHROCYTE [DISTWIDTH] IN BLOOD BY AUTOMATED COUNT: 30.4 % (ref 11.6–15.1)
HCT VFR BLD AUTO: 27.1 % (ref 34.8–46.1)
HGB BLD-MCNC: 7.7 G/DL (ref 11.5–15.4)
IMM GRANULOCYTES # BLD AUTO: 0.02 THOUSAND/UL (ref 0–0.2)
IMM GRANULOCYTES NFR BLD AUTO: 0 % (ref 0–2)
LYMPHOCYTES # BLD AUTO: 1.28 THOUSANDS/ÂΜL (ref 0.6–4.47)
LYMPHOCYTES NFR BLD AUTO: 26 % (ref 14–44)
MCH RBC QN AUTO: 20.6 PG (ref 26.8–34.3)
MCHC RBC AUTO-ENTMCNC: 28.4 G/DL (ref 31.4–37.4)
MCV RBC AUTO: 73 FL (ref 82–98)
MONOCYTES # BLD AUTO: 0.53 THOUSAND/ÂΜL (ref 0.17–1.22)
MONOCYTES NFR BLD AUTO: 11 % (ref 4–12)
NEUTROPHILS # BLD AUTO: 2.91 THOUSANDS/ÂΜL (ref 1.85–7.62)
NEUTS SEG NFR BLD AUTO: 59 % (ref 43–75)
NRBC BLD AUTO-RTO: 0 /100 WBCS
PLATELET # BLD AUTO: 237 THOUSANDS/UL (ref 149–390)
PMV BLD AUTO: 9.7 FL (ref 8.9–12.7)
RBC # BLD AUTO: 3.73 MILLION/UL (ref 3.81–5.12)
WBC # BLD AUTO: 4.9 THOUSAND/UL (ref 4.31–10.16)

## 2025-06-11 PROCEDURE — 85025 COMPLETE CBC W/AUTO DIFF WBC: CPT | Performed by: INTERNAL MEDICINE

## 2025-06-11 PROCEDURE — 99204 OFFICE O/P NEW MOD 45 MIN: CPT | Performed by: SURGERY

## 2025-06-11 PROCEDURE — 88305 TISSUE EXAM BY PATHOLOGIST: CPT | Performed by: PATHOLOGY

## 2025-06-11 NOTE — PROGRESS NOTES
:  Assessment & Plan  Malignant neoplasm of descending colon (HCC)  We reviewed surgical treatment of sigmoid colon cancer with laparoscopic possible robotic sigmoidectomy and primary anastomosis  Orders:  •  NM PET CT skull base to mid thigh; Future      Assessment & Plan  1. Colorectal cancer.  Her CEA levels are significantly elevated at 59. The CT scan revealed a mass in the colon and some thickening around the rectum. A comprehensive discussion was held regarding the increasing incidence of colon and rectal cancers and the potential treatment options based on the location and stage of the cancer. The possibility of requiring a temporary diversion post-surgery was also discussed. A PET scan will be ordered to further investigate the elevated CEA levels and to rule out any potential metastasis.A rectal examination will be conducted today due to the CT finding of rectal thickening. If the PET scan results indicate any abnormalities, an MRI may be considered.    PROCEDURE  Colonoscopy was performed by Dr. Dorado.      History of Present Illness     Leanne Jiang is a 79 y.o. female   History of Present Illness  The patient is a 79-year-old female who presents for colorectal cancer. She is accompanied by her son, daughter-in-law, and granddaughter.    She was recently hospitalized due to persistent weakness and shortness of breath, which had been ongoing since December 2024. Her primary care physician diagnosed her with anemia, prompting immediate hospitalization. She reports no rectal bleeding but acknowledges a weight loss of approximately 15 pounds over the past 4 months, which she attributes to her medication, Farxiga. She has never undergone a colonoscopy prior to the one performed by Dr. Dorado. She is aware of a mass in her colon but has not been informed about potential treatment options. Her bowel movements were regular before her hospital stay, but she has not had a bowel movement since Saturday. She  "reports no history of perianal infections or pain during bowel movements, although she occasionally experiences hard stools that require straining. She also reports no issues with bowel control or incontinence. She has a known history of hemorrhoids. Her surgical history includes a hysterectomy performed when she was 37 years old, which involved a large incision.    SOCIAL HISTORY  She does not smoke.    FAMILY HISTORY  She reports no family history of colon or rectal cancers, Crohn's disease, or ulcerative colitis.    Review of Systems   Constitutional:  Positive for fatigue and unexpected weight change. Negative for chills and fever.   HENT:  Negative for ear pain and sore throat.    Eyes:  Negative for pain and visual disturbance.   Respiratory:  Negative for cough and shortness of breath.    Cardiovascular:  Negative for chest pain and palpitations.   Gastrointestinal:  Negative for abdominal pain and vomiting.   Genitourinary:  Negative for dysuria and hematuria.   Musculoskeletal:  Negative for arthralgias and back pain.   Skin:  Negative for color change and rash.   Neurological:  Negative for seizures and syncope.   All other systems reviewed and are negative.    Objective   Ht 5' 1\" (1.549 m)   Wt 59 kg (130 lb)   BMI 24.56 kg/m²      Physical Exam  Vitals and nursing note reviewed.   Constitutional:       General: She is not in acute distress.     Appearance: She is well-developed.   HENT:      Head: Normocephalic and atraumatic.     Eyes:      Conjunctiva/sclera: Conjunctivae normal.       Cardiovascular:      Rate and Rhythm: Normal rate and regular rhythm.      Heart sounds: No murmur heard.  Pulmonary:      Effort: Pulmonary effort is normal. No respiratory distress.      Breath sounds: Normal breath sounds.   Abdominal:      Palpations: Abdomen is soft.      Tenderness: There is no abdominal tenderness.     Musculoskeletal:         General: No swelling.      Cervical back: Neck supple.     Skin:     " General: Skin is warm and dry.      Capillary Refill: Capillary refill takes less than 2 seconds.     Neurological:      Mental Status: She is alert.     Psychiatric:         Mood and Affect: Mood normal.     Physical Exam  Rectal exam was performed.    Results  Laboratory Studies  CEA level is 59.    Imaging  CT scan showed a mass in the colon and some thickening around the rectum.  Administrative Statements   I have spent a total time of 46 minutes in caring for this patient on the day of the visit/encounter including Diagnostic results, Prognosis, Risks and benefits of tx options, Instructions for management, Patient and family education, Importance of tx compliance, Risk factor reductions, Impressions, Counseling / Coordination of care, Documenting in the medical record, Reviewing/placing orders in the medical record (including tests, medications, and/or procedures), Obtaining or reviewing history  , and Communicating with other healthcare professionals .

## 2025-06-11 NOTE — Clinical Note
Hi! Hoping to present this patient at tumor board. Additionally, I ordered a PET for a very high CEA; would you be able to facilitate rads appointment for family? Thanks so much for your help!

## 2025-06-11 NOTE — PROGRESS NOTES
In-basket message received from Dr. Torres to add [facility]. Chart reviewed and prep started. Pending PET CT on 6/24.

## 2025-06-12 DIAGNOSIS — D50.8 OTHER IRON DEFICIENCY ANEMIA: Primary | ICD-10-CM

## 2025-06-12 NOTE — TELEPHONE ENCOUNTER
Patients GI provider:  Dr. East/Dr. Lovett    Number to return call: [phone]    Reason for call: Pt's grand daughter Rebecca (has consent) returning a call. Per Rebecca pt just dx w/ colon caner and would like a call back today please. Thank you    Scheduled procedure/appointment date if applicable: Apt  7/2/25

## 2025-06-13 ENCOUNTER — PATIENT OUTREACH (OUTPATIENT)
Dept: HEMATOLOGY ONCOLOGY | Facility: CLINIC | Age: 80
End: 2025-06-13

## 2025-06-13 NOTE — PROGRESS NOTES
NN phone outreach to the pt to discuss the med onc referral that is in. Talked to the pt's granddaughter today to discuss the referral, after discussion she states that it was suggested this may be metastatic colon cancer is what she is suspecting. The pt is sched on 6/24/2025 for a PET CT. ONN to follow up on results to confirm appropriateness of the med onc appt. We decided to still schedule the patient with medical oncology several days after PET date. I will touch base again with the granddaughter at that time. She thanked me for my call and discussion.

## 2025-06-16 ENCOUNTER — NURSE TRIAGE (OUTPATIENT)
Age: 80
End: 2025-06-16

## 2025-06-16 NOTE — TELEPHONE ENCOUNTER
"    REASON FOR CONVERSATION: Constipation    SYMPTOMS: No BM since colonoscopy on 6/6 only fecal smearing with wiping. Feeling of urge to need to have a BM but can't. No pain.   Very gassy     OTHER HEALTH INFORMATION: hx colon mass   Takes colace daily   Eating well     PROTOCOL DISPOSITION: See Today in Office    CARE ADVICE PROVIDED: defer to provider     PRACTICE FOLLOW-UP: Please advise           Reason for Disposition   Last bowel movement (BM) > 4 days ago    Answer Assessment - Initial Assessment Questions  1. STOOL PATTERN OR FREQUENCY: \"How often do you have a bowel movement (BM)?\"  (Normal range: 3 times a day to every 3 days)  \"When was your last BM?\"        Last BM 6/6/25   2. STRAINING: \"Do you have to strain to have a BM?\"       No   3. ONSET: \"When did the constipation begin?\"      After colonoscopy   4. RECTAL PAIN: \"Does your rectum hurt when the stool comes out?\" If Yes, ask: \"Do you have hemorrhoids? How bad is the pain?\"  (Scale 1-10; or mild, moderate, severe)      No   5. BM COMPOSITION: \"Are the stools hard?\"       No   6. BLOOD ON STOOLS: \"Has there been any blood on the toilet tissue or on the surface of the BM?\" If Yes, ask: \"When was the last time?\"      No   7. CHRONIC CONSTIPATION: \"Is this a new problem for you?\"  If No, ask: \"How long have you had this problem?\" (days, weeks, months)       No   8. CHANGES IN DIET OR HYDRATION: \"Have there been any recent changes in your diet?\" \"How much fluids are you drinking on a daily basis?\"  \"How much have you had to drink today?\"        9. MEDICINES: \"Have you been taking any new medicines?\" \"Are you taking any narcotic pain medicines?\" (e.g., Dilaudid, morphine, Percocet, Vicodin)         10. LAXATIVES: \"Have you been using any stool softeners, laxatives, or enemas?\"  If Yes, ask \"What, how often, and when was the last time?\"        Colace   11. ACTIVITY:  \"How much walking do you do every day?\"  \"Has your activity level decreased in the past " "week?\"         N/a   12. CAUSE: \"What do you think is causing the constipation?\"   Colon mass   14. OTHER SYMPTOMS: \"Do you have any other symptoms?\" (e.g., abdomen pain, bloating, fever, vomiting)  Just feeling urge to have a BM    Protocols used: Constipation-Adult-[place]    "

## 2025-06-18 ENCOUNTER — RESULTS FOLLOW-UP (OUTPATIENT)
Dept: GASTROENTEROLOGY | Facility: CLINIC | Age: 80
End: 2025-06-18

## 2025-06-18 ENCOUNTER — APPOINTMENT (OUTPATIENT)
Dept: LAB | Age: 80
End: 2025-06-18
Attending: STUDENT IN AN ORGANIZED HEALTH CARE EDUCATION/TRAINING PROGRAM
Payer: MEDICARE

## 2025-06-18 DIAGNOSIS — D50.8 OTHER IRON DEFICIENCY ANEMIA: ICD-10-CM

## 2025-06-18 LAB
BASOPHILS # BLD AUTO: 0.03 THOUSANDS/ÂΜL (ref 0–0.1)
BASOPHILS NFR BLD AUTO: 1 % (ref 0–1)
EOSINOPHIL # BLD AUTO: 0.08 THOUSAND/ÂΜL (ref 0–0.61)
EOSINOPHIL NFR BLD AUTO: 1 % (ref 0–6)
ERYTHROCYTE [DISTWIDTH] IN BLOOD BY AUTOMATED COUNT: 31.1 % (ref 11.6–15.1)
HCT VFR BLD AUTO: 30.8 % (ref 34.8–46.1)
HGB BLD-MCNC: 8.8 G/DL (ref 11.5–15.4)
IMM GRANULOCYTES # BLD AUTO: 0.01 THOUSAND/UL (ref 0–0.2)
IMM GRANULOCYTES NFR BLD AUTO: 0 % (ref 0–2)
LYMPHOCYTES # BLD AUTO: 1.44 THOUSANDS/ÂΜL (ref 0.6–4.47)
LYMPHOCYTES NFR BLD AUTO: 26 % (ref 14–44)
MCH RBC QN AUTO: 21.5 PG (ref 26.8–34.3)
MCHC RBC AUTO-ENTMCNC: 28.6 G/DL (ref 31.4–37.4)
MCV RBC AUTO: 75 FL (ref 82–98)
MONOCYTES # BLD AUTO: 0.47 THOUSAND/ÂΜL (ref 0.17–1.22)
MONOCYTES NFR BLD AUTO: 8 % (ref 4–12)
NEUTROPHILS # BLD AUTO: 3.6 THOUSANDS/ÂΜL (ref 1.85–7.62)
NEUTS SEG NFR BLD AUTO: 64 % (ref 43–75)
NRBC BLD AUTO-RTO: 0 /100 WBCS
PLATELET # BLD AUTO: 284 THOUSANDS/UL (ref 149–390)
PMV BLD AUTO: 9.9 FL (ref 8.9–12.7)
RBC # BLD AUTO: 4.09 MILLION/UL (ref 3.81–5.12)
WBC # BLD AUTO: 5.63 THOUSAND/UL (ref 4.31–10.16)

## 2025-06-18 PROCEDURE — 36415 COLL VENOUS BLD VENIPUNCTURE: CPT

## 2025-06-18 PROCEDURE — 85025 COMPLETE CBC W/AUTO DIFF WBC: CPT

## 2025-06-24 ENCOUNTER — TELEPHONE (OUTPATIENT)
Age: 80
End: 2025-06-24

## 2025-06-24 ENCOUNTER — HOSPITAL ENCOUNTER (OUTPATIENT)
Dept: RADIOLOGY | Age: 80
Discharge: HOME | End: 2025-06-24
Attending: SURGERY
Payer: MEDICARE

## 2025-06-24 DIAGNOSIS — C18.6 MALIGNANT NEOPLASM OF DESCENDING COLON (HCC): ICD-10-CM

## 2025-06-24 LAB — GLUCOSE SERPL-MCNC: 135 MG/DL (ref 65–140)

## 2025-06-24 PROCEDURE — A9552 F18 FDG: HCPCS

## 2025-06-24 PROCEDURE — 82948 REAGENT STRIP/BLOOD GLUCOSE: CPT

## 2025-06-24 PROCEDURE — 78815 PET IMAGE W/CT SKULL-THIGH: CPT

## 2025-06-24 NOTE — TELEPHONE ENCOUNTER
Patients GI provider:  Dr. Torres     Number to return call: ( Rebecca Oliver  [phone] (Mobile)    Reason for call: Pt granddaughter called in to make the office aware that the PET SCAN is done and resulted patient has an apt on 7/2 she would like a sooner apt to discuss     Scheduled procedure/appointment date if applicable: Apt/procedure  7/2 next [facility]

## 2025-06-25 NOTE — TELEPHONE ENCOUNTER
Called and spoke to Rebecca, granddaughter.  Rebecca stated patient originally had an appointment on 6/24/25 to review PET scan results but appointment was rescheduled to 7/2 to give more time for results to come in.  Results were in within 3 hours and she was calling to see if the originally appointment was still available. I explained that if we got a cancellation we could call her to get pt in sooner than 7/2.  She was agreeable with same.

## 2025-06-26 ENCOUNTER — DOCUMENTATION (OUTPATIENT)
Dept: HEMATOLOGY ONCOLOGY | Facility: CLINIC | Age: 80
End: 2025-06-26

## 2025-06-26 ENCOUNTER — PATIENT OUTREACH (OUTPATIENT)
Dept: HEMATOLOGY ONCOLOGY | Facility: CLINIC | Age: 80
End: 2025-06-26

## 2025-06-26 NOTE — PROGRESS NOTES
RECTAL/GI MULTIDISCIPLINARY CASE REVIEW    DATE:  6/26/2025    PRESENTING DOCTOR: Dr. Torres    DIAGNOSIS: Sigmoid colon cancer    Leanne Jiang is a 79 y.o. female who was presented at the Rectal/GI Multidisciplinary Conference today.    PHYSICIAN RECOMMENDED PLAN:    -Recommend MRI of the pelvis rectal cancer protocol to evaluate perianal area before excisional biopsy.  -Excise the area of the perianal mass.   -Monitor and trend CEA level.  -Represent case on tumor board.  -Patient is scheduled on 7/2/2025 with Dr. Torres (colorectal surgery) for follow up.    Team agreed to plan.    The final treatment plan will be left to the discretion of the patient and the treating physician.     DISCLAIMERS:  TO THE TREATING PHYSICIAN:  This conference is a meeting of clinicians from various specialty areas who evaluate and discuss patients for whom a multidisciplinary treatment approach is being considered. Please note that the above opinion was a consensus of the conference attendees and is intended only to assist in quality care of the discussed patient.  The responsibility for follow up on the input given during the conference, along with any final decisions regarding plan of care, is that of the patient and the patient's provider. Accordingly, appointments have only been recommended based on this information and have NOT been scheduled unless otherwise noted.      TO THE PATIENT:  This summary is a brief record of major aspects of your cancer treatment. You may choose to share a copy with any of your doctors or nurses. However, this is not a detailed or comprehensive record of your care.      NCCN guidelines were readily available for review at this discussion

## 2025-06-26 NOTE — PROGRESS NOTES
NN phone outreach to the pt's granddaughter about cancellation of med onc appt. No answer, LVM. Pending call back. Appt has been cancelled. Pt will see colorectal surgery on 7/2.

## 2025-07-02 ENCOUNTER — OFFICE VISIT (OUTPATIENT)
Age: 80
End: 2025-07-02
Payer: MEDICARE

## 2025-07-02 ENCOUNTER — TELEPHONE (OUTPATIENT)
Age: 80
End: 2025-07-02

## 2025-07-02 DIAGNOSIS — K63.89 MASS OF COLON: ICD-10-CM

## 2025-07-02 PROCEDURE — 99213 OFFICE O/P EST LOW 20 MIN: CPT | Performed by: SURGERY

## 2025-07-02 RX ORDER — DIAZEPAM 2 MG/1
2 TABLET ORAL EVERY 6 HOURS PRN
Qty: 2 TABLET | Refills: 0 | Status: SHIPPED | OUTPATIENT
Start: 2025-07-02

## 2025-07-02 NOTE — PROGRESS NOTES
:  Assessment & Plan  Mass of colon    Orders:    Ambulatory Referral to Colorectal Surgery    MRI pelvis rectal cancer staging wo contrast; Future    diazepam (VALIUM) 2 mg tablet; Take 1 tablet (2 mg total) by mouth every 6 (six) hours as needed for anxiety (premedication for MRI) for up to 2 doses      Assessment & Plan  1. Colon cancer.  The PET scan revealed a mass indicative of colon cancer.  Perianal mass was also seen on PET scan, and patient and her family understand that malignancy must be ruled out. MRI of the pelvis has been ordered to further characterize the mass and ensure it is safe to excise. The MRI will help determine if any muscles, blood vessels, or nerves are involved. Based on the MRI results, an exam under anesthesia and excisional biopsy will be scheduled. The patient was informed that the recovery from this procedure should be straightforward, with minimal pain expected. She was advised to continue taking MiraLAX to maintain bowel movements. If she skips several days without a bowel movement, she should resume taking MiraLAX.        History of Present Illness     Leanne Jiang is a 79 y.o. female   History of Present Illness  The patient is a 79-year-old female who presents for a follow-up on her PET scan.    She has been experiencing constipation since her colonoscopy, which she believes may be related to the mass. She was advised to use MiraLAX but has not taken it for the past 2 days.    She also reports a small bump in her rectal area, which was previously identified as a hemorrhoid by her gynecologist. She expresses concern about undergoing an MRI due to her claustrophobia, although she managed to complete a PET scan without issue.    MEDICATIONS  MiraLAX  Review of Systems   Constitutional:  Negative for chills and fever.   HENT:  Negative for ear pain and sore throat.    Eyes:  Negative for pain and visual disturbance.   Respiratory:  Negative for cough and shortness of breath.     Cardiovascular:  Negative for chest pain and palpitations.   Gastrointestinal:  Positive for constipation. Negative for abdominal pain and vomiting.   Genitourinary:  Negative for dysuria and hematuria.   Musculoskeletal:  Negative for arthralgias and back pain.   Skin:  Negative for color change and rash.   Neurological:  Negative for seizures and syncope.   All other systems reviewed and are negative.    Objective   There were no vitals taken for this visit.     Physical Exam  Vitals and nursing note reviewed.   Constitutional:       General: She is not in acute distress.     Appearance: She is well-developed.   HENT:      Head: Normocephalic and atraumatic.     Eyes:      Conjunctiva/sclera: Conjunctivae normal.       Cardiovascular:      Rate and Rhythm: Normal rate and regular rhythm.      Heart sounds: No murmur heard.  Pulmonary:      Effort: Pulmonary effort is normal. No respiratory distress.      Breath sounds: Normal breath sounds.   Abdominal:      Palpations: Abdomen is soft.      Tenderness: There is no abdominal tenderness.     Musculoskeletal:         General: No swelling.      Cervical back: Neck supple.     Skin:     General: Skin is warm and dry.      Capillary Refill: Capillary refill takes less than 2 seconds.     Neurological:      Mental Status: She is alert.     Psychiatric:         Mood and Affect: Mood normal.       Physical Exam      Results  Imaging  PET scan showed a mass and a spot lighting up around the bottom.  Administrative Statements   I have spent a total time of 20 minutes in caring for this patient on the day of the visit/encounter including Diagnostic results, Prognosis, Risks and benefits of tx options, Instructions for management, Patient and family education, Importance of tx compliance, Risk factor reductions, Impressions, Counseling / Coordination of care, Documenting in the medical record, Reviewing/placing orders in the medical record (including tests, medications,  and/or procedures), Obtaining or reviewing history  , and Communicating with other healthcare professionals .

## 2025-07-03 DIAGNOSIS — K63.89 MASS OF COLON: Primary | ICD-10-CM

## 2025-07-03 NOTE — TELEPHONE ENCOUNTER
Left message for patient to return call.    HCG level today came in at [phone]    Jasper Smith, RN, BSN             Patient's grand daughter aware of medication and new order. She will call to get this scheduled within a week as requested. After the imaging is complete, if she does not hear from the office in the next 2-3 days she should call to expedite biopsies. She thanked me for my call.

## 2025-07-03 NOTE — TELEPHONE ENCOUNTER
Granddaughter calling (consent verified).  MRI was ordered routine with urgent read.  First available MRI is 7/21.  Wondering if this is soon enough or should MRI be ordered stat.  Please advise.    CB: [phone]

## 2025-07-03 NOTE — TELEPHONE ENCOUNTER
Patients family aware. Patients family also wanted to make Dr. Torres aware that she has a perianal lump that feels larger, non reducible and rates her pain 6-7/10.

## 2025-07-07 ENCOUNTER — LAB REQUISITION (OUTPATIENT)
Dept: LAB | Facility: HOSPITAL | Age: 80
End: 2025-07-07
Payer: MEDICARE

## 2025-07-07 DIAGNOSIS — D64.9 ANEMIA, UNSPECIFIED: ICD-10-CM

## 2025-07-07 DIAGNOSIS — E11.9 TYPE 2 DIABETES MELLITUS WITHOUT COMPLICATIONS (HCC): ICD-10-CM

## 2025-07-07 LAB
ALBUMIN SERPL BCG-MCNC: 3.9 G/DL (ref 3.5–5)
ALP SERPL-CCNC: 80 U/L (ref 34–104)
ALT SERPL W P-5'-P-CCNC: 6 U/L (ref 7–52)
ANION GAP SERPL CALCULATED.3IONS-SCNC: 9 MMOL/L (ref 4–13)
AST SERPL W P-5'-P-CCNC: 13 U/L (ref 13–39)
BASOPHILS # BLD AUTO: 0.03 THOUSANDS/ÂΜL (ref 0–0.1)
BASOPHILS NFR BLD AUTO: 1 % (ref 0–1)
BILIRUB SERPL-MCNC: 0.61 MG/DL (ref 0.2–1)
BUN SERPL-MCNC: 16 MG/DL (ref 5–25)
CALCIUM SERPL-MCNC: 9.7 MG/DL (ref 8.4–10.2)
CHLORIDE SERPL-SCNC: 102 MMOL/L (ref 96–108)
CO2 SERPL-SCNC: 29 MMOL/L (ref 21–32)
CREAT SERPL-MCNC: 0.62 MG/DL (ref 0.6–1.3)
EOSINOPHIL # BLD AUTO: 0.11 THOUSAND/ÂΜL (ref 0–0.61)
EOSINOPHIL NFR BLD AUTO: 2 % (ref 0–6)
FERRITIN SERPL-MCNC: 24 NG/ML (ref 30–307)
GFR SERPL CREATININE-BSD FRML MDRD: 86 ML/MIN/1.73SQ M
GLUCOSE SERPL-MCNC: 135 MG/DL (ref 65–140)
HCT VFR BLD AUTO: 34.1 % (ref 34.8–46.1)
HGB BLD-MCNC: 10.1 G/DL (ref 11.5–15.4)
IMM GRANULOCYTES # BLD AUTO: 0.02 THOUSAND/UL (ref 0–0.2)
IMM GRANULOCYTES NFR BLD AUTO: 0 % (ref 0–2)
IRON SERPL-MCNC: 54 UG/DL (ref 50–212)
LYMPHOCYTES # BLD AUTO: 1.68 THOUSANDS/ÂΜL (ref 0.6–4.47)
LYMPHOCYTES NFR BLD AUTO: 31 % (ref 14–44)
MCH RBC QN AUTO: 23 PG (ref 26.8–34.3)
MCHC RBC AUTO-ENTMCNC: 29.6 G/DL (ref 31.4–37.4)
MCV RBC AUTO: 78 FL (ref 82–98)
MONOCYTES # BLD AUTO: 0.39 THOUSAND/ÂΜL (ref 0.17–1.22)
MONOCYTES NFR BLD AUTO: 7 % (ref 4–12)
NEUTROPHILS # BLD AUTO: 3.12 THOUSANDS/ÂΜL (ref 1.85–7.62)
NEUTS SEG NFR BLD AUTO: 59 % (ref 43–75)
NRBC BLD AUTO-RTO: 0 /100 WBCS
PLATELET # BLD AUTO: 208 THOUSANDS/UL (ref 149–390)
PMV BLD AUTO: 10.4 FL (ref 8.9–12.7)
POTASSIUM SERPL-SCNC: 4.6 MMOL/L (ref 3.5–5.3)
PROT SERPL-MCNC: 6.7 G/DL (ref 6.4–8.4)
RBC # BLD AUTO: 4.4 MILLION/UL (ref 3.81–5.12)
SODIUM SERPL-SCNC: 140 MMOL/L (ref 135–147)
WBC # BLD AUTO: 5.35 THOUSAND/UL (ref 4.31–10.16)

## 2025-07-07 PROCEDURE — 82728 ASSAY OF FERRITIN: CPT | Performed by: INTERNAL MEDICINE

## 2025-07-07 PROCEDURE — 80053 COMPREHEN METABOLIC PANEL: CPT | Performed by: INTERNAL MEDICINE

## 2025-07-07 PROCEDURE — 85025 COMPLETE CBC W/AUTO DIFF WBC: CPT | Performed by: INTERNAL MEDICINE

## 2025-07-07 PROCEDURE — 83540 ASSAY OF IRON: CPT | Performed by: INTERNAL MEDICINE

## 2025-07-07 NOTE — TELEPHONE ENCOUNTER
Discussed with patient to monitor their blood pressure and if systolic blood pressure goes above 140 or diastolic is above 90 to return to clinic.  Take medicines as directed, call for any problems, patient not having or any worrisome symptoms.    Continue to monitor his blood pressure once a day.  We are going to add losartan to his regimen.    Orders:    sildenafil (VIAGRA) 50 MG tablet; Take 1 tablet by mouth As Needed for Erectile Dysfunction.    pantoprazole (PROTONIX) 40 MG EC tablet; Take 1 tablet by mouth Daily.    Metoprolol Succinate 25 MG capsule extended-release 24 hour sprinkle; Take 25 mg by mouth 2 (Two) Times a Day.    amLODIPine (NORVASC) 5 MG tablet; Take 1 tablet by mouth Daily.    Comprehensive Metabolic Panel; Future    Hemoglobin A1c; Future    Lipid Panel; Future    PSA Screen; Future    CBC & Differential; Future    Vitamin B12; Future    Vitamin D,25-Hydroxy; Future    TSH Rfx On Abnormal To Free T4; Future     Looks good lows have improved continue current regimen

## 2025-07-08 ENCOUNTER — HOSPITAL ENCOUNTER (OUTPATIENT)
Dept: MRI IMAGING | Facility: HOSPITAL | Age: 80
Discharge: HOME | End: 2025-07-08
Attending: SURGERY
Payer: MEDICARE

## 2025-07-08 DIAGNOSIS — K63.89 MASS OF COLON: ICD-10-CM

## 2025-07-08 PROCEDURE — 72195 MRI PELVIS W/O DYE: CPT

## 2025-07-14 ENCOUNTER — TELEPHONE (OUTPATIENT)
Age: 80
End: 2025-07-14

## 2025-07-14 ENCOUNTER — PREP FOR PROCEDURE (OUTPATIENT)
Age: 80
End: 2025-07-14

## 2025-07-14 DIAGNOSIS — K62.89 PERIANAL MASS: Primary | ICD-10-CM

## 2025-07-14 RX ORDER — SODIUM CHLORIDE, SODIUM LACTATE, POTASSIUM CHLORIDE, CALCIUM CHLORIDE 600; 310; 30; 20 MG/100ML; MG/100ML; MG/100ML; MG/100ML
20 INJECTION, SOLUTION INTRAVENOUS CONTINUOUS
Status: CANCELLED | OUTPATIENT
Start: 2025-07-14

## 2025-07-14 NOTE — TELEPHONE ENCOUNTER
Spoke to Rebecca today. She is aware that patient needs biopsy of anorectal mass. Emma will reach out to schedule this asap. Patient is currently taking 2 stool softeners and 2 doses of miralax daily, having incomplete bowel movements. They have concerns of bowel obstruction given colon mass. They understand the anorectal mass must be biopsied prior to colon cancer surgery. Discussed cares such as offloading irritated area, warm hand showers, barrier creams. She thanked me for my call.

## 2025-07-14 NOTE — TELEPHONE ENCOUNTER
Rebecca navarro (consent verified) regarding MRI results.  States was d/t be done STAT and was done last Tuesday and still no results.  Concerned d/t increased pain in rectum.  Please advise.

## 2025-07-15 ENCOUNTER — TELEPHONE (OUTPATIENT)
Age: 80
End: 2025-07-15

## 2025-07-18 RX ORDER — BISACODYL 5 MG/1
5 TABLET, DELAYED RELEASE ORAL DAILY PRN
COMMUNITY

## 2025-07-18 RX ORDER — SENNA AND DOCUSATE SODIUM 50; 8.6 MG/1; MG/1
1 TABLET, FILM COATED ORAL DAILY
COMMUNITY

## 2025-07-18 RX ORDER — INSULIN LISPRO 100 [IU]/ML
INJECTION, SOLUTION INTRAVENOUS; SUBCUTANEOUS
COMMUNITY

## 2025-07-21 ENCOUNTER — ANESTHESIA EVENT (OUTPATIENT)
Dept: PERIOP | Facility: AMBULARY SURGERY CENTER | Age: 80
End: 2025-07-21
Payer: MEDICARE

## 2025-07-29 ENCOUNTER — TELEPHONE (OUTPATIENT)
Dept: ENDOCRINOLOGY | Facility: CLINIC | Age: 80
End: 2025-07-29

## 2025-07-29 ENCOUNTER — PREP FOR PROCEDURE (OUTPATIENT)
Dept: OTHER | Facility: HOSPITAL | Age: 80
End: 2025-07-29

## 2025-07-29 ENCOUNTER — ANESTHESIA (OUTPATIENT)
Dept: PERIOP | Facility: AMBULARY SURGERY CENTER | Age: 80
End: 2025-07-29
Payer: MEDICARE

## 2025-07-29 ENCOUNTER — HOSPITAL ENCOUNTER (OUTPATIENT)
Facility: AMBULARY SURGERY CENTER | Age: 80
Setting detail: OUTPATIENT SURGERY
Discharge: HOME | End: 2025-07-29
Attending: SURGERY | Admitting: SURGERY
Payer: MEDICARE

## 2025-07-29 VITALS
TEMPERATURE: 96.6 F | HEART RATE: 55 BPM | WEIGHT: 125 LBS | HEIGHT: 61 IN | DIASTOLIC BLOOD PRESSURE: 81 MMHG | SYSTOLIC BLOOD PRESSURE: 186 MMHG | RESPIRATION RATE: 19 BRPM | OXYGEN SATURATION: 99 % | BODY MASS INDEX: 23.6 KG/M2

## 2025-07-29 DIAGNOSIS — C18.6 MALIGNANT NEOPLASM OF DESCENDING COLON (HCC): Primary | ICD-10-CM

## 2025-07-29 DIAGNOSIS — K62.89 PERIANAL MASS: ICD-10-CM

## 2025-07-29 LAB
GLUCOSE SERPL-MCNC: 121 MG/DL (ref 65–140)
GLUCOSE SERPL-MCNC: 136 MG/DL (ref 65–140)

## 2025-07-29 PROCEDURE — 88341 IMHCHEM/IMCYTCHM EA ADD ANTB: CPT | Performed by: PATHOLOGY

## 2025-07-29 PROCEDURE — 82948 REAGENT STRIP/BLOOD GLUCOSE: CPT

## 2025-07-29 PROCEDURE — 88307 TISSUE EXAM BY PATHOLOGIST: CPT | Performed by: PATHOLOGY

## 2025-07-29 PROCEDURE — 46922 EXCISION OF ANAL LESION(S): CPT | Performed by: SURGERY

## 2025-07-29 PROCEDURE — NC001 PR NO CHARGE: Performed by: SURGERY

## 2025-07-29 PROCEDURE — 88360 TUMOR IMMUNOHISTOCHEM/MANUAL: CPT | Performed by: PATHOLOGY

## 2025-07-29 PROCEDURE — 88342 IMHCHEM/IMCYTCHM 1ST ANTB: CPT | Performed by: PATHOLOGY

## 2025-07-29 RX ORDER — METOCLOPRAMIDE HYDROCHLORIDE 5 MG/ML
10 INJECTION INTRAMUSCULAR; INTRAVENOUS ONCE AS NEEDED
Status: DISCONTINUED | OUTPATIENT
Start: 2025-07-29 | End: 2025-07-29 | Stop reason: HOSPADM

## 2025-07-29 RX ORDER — ONDANSETRON 2 MG/ML
4 INJECTION INTRAMUSCULAR; INTRAVENOUS ONCE AS NEEDED
Status: DISCONTINUED | OUTPATIENT
Start: 2025-07-29 | End: 2025-07-29 | Stop reason: HOSPADM

## 2025-07-29 RX ORDER — FENTANYL CITRATE 50 UG/ML
INJECTION, SOLUTION INTRAMUSCULAR; INTRAVENOUS AS NEEDED
Status: DISCONTINUED | OUTPATIENT
Start: 2025-07-29 | End: 2025-07-29

## 2025-07-29 RX ORDER — CEFAZOLIN SODIUM 2 G/50ML
2000 SOLUTION INTRAVENOUS ONCE
Status: COMPLETED | OUTPATIENT
Start: 2025-07-29 | End: 2025-07-29

## 2025-07-29 RX ORDER — SODIUM CHLORIDE, SODIUM LACTATE, POTASSIUM CHLORIDE, CALCIUM CHLORIDE 600; 310; 30; 20 MG/100ML; MG/100ML; MG/100ML; MG/100ML
20 INJECTION, SOLUTION INTRAVENOUS CONTINUOUS
OUTPATIENT
Start: 2025-07-29

## 2025-07-29 RX ORDER — SODIUM CHLORIDE, SODIUM LACTATE, POTASSIUM CHLORIDE, CALCIUM CHLORIDE 600; 310; 30; 20 MG/100ML; MG/100ML; MG/100ML; MG/100ML
20 INJECTION, SOLUTION INTRAVENOUS CONTINUOUS
Status: DISCONTINUED | OUTPATIENT
Start: 2025-07-29 | End: 2025-07-29 | Stop reason: HOSPADM

## 2025-07-29 RX ORDER — METRONIDAZOLE 500 MG/100ML
500 INJECTION, SOLUTION INTRAVENOUS ONCE
OUTPATIENT
Start: 2025-07-29 | End: 2025-07-29

## 2025-07-29 RX ORDER — CEFAZOLIN SODIUM 2 G/50ML
2000 SOLUTION INTRAVENOUS ONCE
OUTPATIENT
Start: 2025-07-29 | End: 2025-07-29

## 2025-07-29 RX ORDER — FENTANYL CITRATE/PF 50 MCG/ML
12.5 SYRINGE (ML) INJECTION
Status: DISCONTINUED | OUTPATIENT
Start: 2025-07-29 | End: 2025-07-29 | Stop reason: HOSPADM

## 2025-07-29 RX ORDER — DEXAMETHASONE SODIUM PHOSPHATE 10 MG/ML
INJECTION, SOLUTION INTRAMUSCULAR; INTRAVENOUS AS NEEDED
Status: DISCONTINUED | OUTPATIENT
Start: 2025-07-29 | End: 2025-07-29

## 2025-07-29 RX ORDER — LIDOCAINE HYDROCHLORIDE 10 MG/ML
INJECTION, SOLUTION EPIDURAL; INFILTRATION; INTRACAUDAL; PERINEURAL AS NEEDED
Status: DISCONTINUED | OUTPATIENT
Start: 2025-07-29 | End: 2025-07-29 | Stop reason: HOSPADM

## 2025-07-29 RX ORDER — SODIUM CHLORIDE, SODIUM LACTATE, POTASSIUM CHLORIDE, CALCIUM CHLORIDE 600; 310; 30; 20 MG/100ML; MG/100ML; MG/100ML; MG/100ML
INJECTION, SOLUTION INTRAVENOUS CONTINUOUS PRN
Status: DISCONTINUED | OUTPATIENT
Start: 2025-07-29 | End: 2025-07-29

## 2025-07-29 RX ORDER — LIDOCAINE HYDROCHLORIDE 10 MG/ML
INJECTION, SOLUTION EPIDURAL; INFILTRATION; INTRACAUDAL; PERINEURAL AS NEEDED
Status: DISCONTINUED | OUTPATIENT
Start: 2025-07-29 | End: 2025-07-29

## 2025-07-29 RX ORDER — MAGNESIUM HYDROXIDE 1200 MG/15ML
LIQUID ORAL AS NEEDED
Status: DISCONTINUED | OUTPATIENT
Start: 2025-07-29 | End: 2025-07-29 | Stop reason: HOSPADM

## 2025-07-29 RX ORDER — HEPARIN SODIUM 5000 [USP'U]/ML
5000 INJECTION, SOLUTION INTRAVENOUS; SUBCUTANEOUS EVERY 8 HOURS SCHEDULED
OUTPATIENT
Start: 2025-07-29

## 2025-07-29 RX ORDER — PROPOFOL 10 MG/ML
INJECTION, EMULSION INTRAVENOUS AS NEEDED
Status: DISCONTINUED | OUTPATIENT
Start: 2025-07-29 | End: 2025-07-29

## 2025-07-29 RX ORDER — ONDANSETRON 2 MG/ML
INJECTION INTRAMUSCULAR; INTRAVENOUS AS NEEDED
Status: DISCONTINUED | OUTPATIENT
Start: 2025-07-29 | End: 2025-07-29

## 2025-07-29 RX ADMIN — ONDANSETRON 4 MG: 2 INJECTION INTRAMUSCULAR; INTRAVENOUS at 11:03

## 2025-07-29 RX ADMIN — FENTANYL CITRATE 25 MCG: 50 INJECTION INTRAMUSCULAR; INTRAVENOUS at 11:02

## 2025-07-29 RX ADMIN — DEXAMETHASONE SODIUM PHOSPHATE 6 MG: 10 INJECTION, SOLUTION INTRAMUSCULAR; INTRAVENOUS at 11:04

## 2025-07-29 RX ADMIN — LIDOCAINE HYDROCHLORIDE 50 MG: 10 INJECTION, SOLUTION EPIDURAL; INFILTRATION; INTRACAUDAL at 11:02

## 2025-07-29 RX ADMIN — PROPOFOL 30 MG: 10 INJECTION, EMULSION INTRAVENOUS at 11:02

## 2025-07-29 RX ADMIN — PROPOFOL 100 MCG/KG/MIN: 10 INJECTION, EMULSION INTRAVENOUS at 11:03

## 2025-07-29 RX ADMIN — PROPOFOL 30 MG: 10 INJECTION, EMULSION INTRAVENOUS at 11:22

## 2025-07-29 RX ADMIN — SODIUM CHLORIDE, SODIUM LACTATE, POTASSIUM CHLORIDE, AND CALCIUM CHLORIDE: .6; .31; .03; .02 INJECTION, SOLUTION INTRAVENOUS at 11:02

## 2025-07-29 RX ADMIN — CEFAZOLIN SODIUM 2000 MG: 2 SOLUTION INTRAVENOUS at 11:02

## 2025-07-31 ENCOUNTER — TELEPHONE (OUTPATIENT)
Age: 80
End: 2025-07-31

## 2025-07-31 DIAGNOSIS — C18.6 MALIGNANT NEOPLASM OF DESCENDING COLON (HCC): ICD-10-CM

## 2025-07-31 DIAGNOSIS — K63.89 MASS OF COLON: Primary | ICD-10-CM

## 2025-08-04 ENCOUNTER — APPOINTMENT (OUTPATIENT)
Dept: LAB | Age: 80
End: 2025-08-04
Payer: MEDICARE

## 2025-08-04 DIAGNOSIS — C18.9 MALIGNANT NEOPLASM OF COLON, UNSPECIFIED PART OF COLON (HCC): ICD-10-CM

## 2025-08-04 DIAGNOSIS — D64.9 ANEMIA, UNSPECIFIED TYPE: ICD-10-CM

## 2025-08-04 LAB
ANION GAP SERPL CALCULATED.3IONS-SCNC: 7 MMOL/L (ref 4–13)
BUN SERPL-MCNC: 18 MG/DL (ref 5–25)
CALCIUM SERPL-MCNC: 10.2 MG/DL (ref 8.4–10.2)
CHLORIDE SERPL-SCNC: 101 MMOL/L (ref 96–108)
CO2 SERPL-SCNC: 30 MMOL/L (ref 21–32)
CREAT SERPL-MCNC: 0.55 MG/DL (ref 0.6–1.3)
ERYTHROCYTE [DISTWIDTH] IN BLOOD BY AUTOMATED COUNT: 22.4 % (ref 11.6–15.1)
FERRITIN SERPL-MCNC: 17 NG/ML (ref 30–307)
GFR SERPL CREATININE-BSD FRML MDRD: 89 ML/MIN/1.73SQ M
GLUCOSE SERPL-MCNC: 178 MG/DL (ref 65–140)
HCT VFR BLD AUTO: 35.7 % (ref 34.8–46.1)
HGB BLD-MCNC: 10.8 G/DL (ref 11.5–15.4)
IRON SERPL-MCNC: 47 UG/DL (ref 50–212)
MCH RBC QN AUTO: 24.3 PG (ref 26.8–34.3)
MCHC RBC AUTO-ENTMCNC: 30.3 G/DL (ref 31.4–37.4)
MCV RBC AUTO: 80 FL (ref 82–98)
PLATELET # BLD AUTO: 203 THOUSANDS/UL (ref 149–390)
PMV BLD AUTO: 10 FL (ref 8.9–12.7)
POTASSIUM SERPL-SCNC: 4.4 MMOL/L (ref 3.5–5.3)
RBC # BLD AUTO: 4.44 MILLION/UL (ref 3.81–5.12)
SODIUM SERPL-SCNC: 138 MMOL/L (ref 135–147)
WBC # BLD AUTO: 4.35 THOUSAND/UL (ref 4.31–10.16)

## 2025-08-04 PROCEDURE — 36415 COLL VENOUS BLD VENIPUNCTURE: CPT

## 2025-08-04 PROCEDURE — 80048 BASIC METABOLIC PNL TOTAL CA: CPT

## 2025-08-04 PROCEDURE — 83540 ASSAY OF IRON: CPT

## 2025-08-04 PROCEDURE — 85027 COMPLETE CBC AUTOMATED: CPT

## 2025-08-04 PROCEDURE — 82728 ASSAY OF FERRITIN: CPT

## 2025-08-05 PROCEDURE — 88341 IMHCHEM/IMCYTCHM EA ADD ANTB: CPT | Performed by: PATHOLOGY

## 2025-08-05 PROCEDURE — 88342 IMHCHEM/IMCYTCHM 1ST ANTB: CPT | Performed by: PATHOLOGY

## 2025-08-05 PROCEDURE — 88307 TISSUE EXAM BY PATHOLOGIST: CPT | Performed by: PATHOLOGY

## 2025-08-05 PROCEDURE — 88360 TUMOR IMMUNOHISTOCHEM/MANUAL: CPT | Performed by: PATHOLOGY

## 2025-08-06 DIAGNOSIS — K63.89 MASS OF COLON: Primary | ICD-10-CM

## 2025-08-06 RX ORDER — NEOMYCIN SULFATE 500 MG/1
TABLET ORAL
Qty: 6 TABLET | Refills: 0 | Status: SHIPPED | OUTPATIENT
Start: 2025-08-06 | End: 2025-08-07

## 2025-08-06 RX ORDER — METRONIDAZOLE 500 MG/1
TABLET ORAL
Qty: 3 TABLET | Refills: 0 | Status: SHIPPED | OUTPATIENT
Start: 2025-08-06 | End: 2025-08-07

## 2025-08-07 ENCOUNTER — TELEPHONE (OUTPATIENT)
Dept: ANESTHESIOLOGY | Facility: CLINIC | Age: 80
End: 2025-08-07

## 2025-08-07 ENCOUNTER — ANESTHESIA EVENT (INPATIENT)
Dept: PERIOP | Facility: HOSPITAL | Age: 80
DRG: 330 | End: 2025-08-07
Payer: MEDICARE

## 2025-08-07 RX ORDER — AMLODIPINE BESYLATE 5 MG/1
1 TABLET ORAL DAILY
COMMUNITY
Start: 2025-07-29

## 2025-08-11 ENCOUNTER — CONSULT (OUTPATIENT)
Dept: ANESTHESIOLOGY | Facility: CLINIC | Age: 80
End: 2025-08-11
Payer: MEDICARE

## 2025-08-11 ENCOUNTER — RESULTS FOLLOW-UP (OUTPATIENT)
Dept: NEPHROLOGY | Facility: CLINIC | Age: 80
End: 2025-08-11

## 2025-08-11 ENCOUNTER — APPOINTMENT (OUTPATIENT)
Dept: LAB | Age: 80
End: 2025-08-11
Payer: MEDICARE

## 2025-08-11 PROBLEM — Z01.89 ENCOUNTER FOR GERIATRIC ASSESSMENT: Status: ACTIVE | Noted: 2025-08-11

## 2025-08-18 ENCOUNTER — HOSPITAL ENCOUNTER (INPATIENT)
Facility: HOSPITAL | Age: 80
LOS: 3 days | Discharge: HOME HEALTH | DRG: 330 | End: 2025-08-21
Attending: UROLOGY | Admitting: SURGERY
Payer: MEDICARE

## 2025-08-18 ENCOUNTER — ANESTHESIA (INPATIENT)
Dept: PERIOP | Facility: HOSPITAL | Age: 80
DRG: 330 | End: 2025-08-18
Payer: MEDICARE

## 2025-08-18 DIAGNOSIS — Z01.89 ENCOUNTER FOR GERIATRIC ASSESSMENT: ICD-10-CM

## 2025-08-18 DIAGNOSIS — C18.6 MALIGNANT NEOPLASM OF DESCENDING COLON (HCC): Primary | ICD-10-CM

## 2025-08-18 LAB
ABO GROUP BLD: NORMAL
BLD GP AB SCN SERPL QL: NEGATIVE
GLUCOSE SERPL-MCNC: 100 MG/DL (ref 65–140)
GLUCOSE SERPL-MCNC: 152 MG/DL (ref 65–140)
GLUCOSE SERPL-MCNC: 172 MG/DL (ref 65–140)
GLUCOSE SERPL-MCNC: 224 MG/DL (ref 65–140)
RH BLD: POSITIVE
SPECIMEN EXPIRATION DATE: NORMAL

## 2025-08-18 PROCEDURE — 0DBM4ZZ EXCISION OF DESCENDING COLON, PERCUTANEOUS ENDOSCOPIC APPROACH: ICD-10-PCS | Performed by: SURGERY

## 2025-08-18 PROCEDURE — 88309 TISSUE EXAM BY PATHOLOGIST: CPT | Performed by: PATHOLOGY

## 2025-08-18 PROCEDURE — 8E0W4CZ ROBOTIC ASSISTED PROCEDURE OF TRUNK REGION, PERCUTANEOUS ENDOSCOPIC APPROACH: ICD-10-PCS | Performed by: SURGERY

## 2025-08-18 PROCEDURE — S2900 ROBOTIC SURGICAL SYSTEM: HCPCS | Performed by: SURGERY

## 2025-08-18 PROCEDURE — 0DJD8ZZ INSPECTION OF LOWER INTESTINAL TRACT, VIA NATURAL OR ARTIFICIAL OPENING ENDOSCOPIC: ICD-10-PCS | Performed by: SURGERY

## 2025-08-18 PROCEDURE — 82948 REAGENT STRIP/BLOOD GLUCOSE: CPT

## 2025-08-18 PROCEDURE — 88341 IMHCHEM/IMCYTCHM EA ADD ANTB: CPT | Performed by: PATHOLOGY

## 2025-08-18 PROCEDURE — NC001 PR NO CHARGE: Performed by: SURGERY

## 2025-08-18 PROCEDURE — 88342 IMHCHEM/IMCYTCHM 1ST ANTB: CPT | Performed by: PATHOLOGY

## 2025-08-18 PROCEDURE — 86900 BLOOD TYPING SEROLOGIC ABO: CPT | Performed by: UROLOGY

## 2025-08-18 PROCEDURE — 52005 CYSTO W/URTRL CATHJ: CPT | Performed by: UROLOGY

## 2025-08-18 PROCEDURE — C1758 CATHETER, URETERAL: HCPCS | Performed by: UROLOGY

## 2025-08-18 PROCEDURE — 44204 LAPARO PARTIAL COLECTOMY: CPT | Performed by: SURGERY

## 2025-08-18 PROCEDURE — 86850 RBC ANTIBODY SCREEN: CPT | Performed by: UROLOGY

## 2025-08-18 PROCEDURE — 0T788DZ DILATION OF BILATERAL URETERS WITH INTRALUMINAL DEVICE, VIA NATURAL OR ARTIFICIAL OPENING ENDOSCOPIC: ICD-10-PCS | Performed by: UROLOGY

## 2025-08-18 PROCEDURE — 0DTNFZZ RESECTION OF SIGMOID COLON, VIA NATURAL OR ARTIFICIAL OPENING WITH PERCUTANEOUS ENDOSCOPIC ASSISTANCE: ICD-10-PCS | Performed by: SURGERY

## 2025-08-18 PROCEDURE — C1769 GUIDE WIRE: HCPCS | Performed by: UROLOGY

## 2025-08-18 PROCEDURE — 86901 BLOOD TYPING SEROLOGIC RH(D): CPT | Performed by: UROLOGY

## 2025-08-18 RX ORDER — LOSARTAN POTASSIUM 50 MG/1
100 TABLET ORAL DAILY
Status: DISCONTINUED | OUTPATIENT
Start: 2025-08-18 | End: 2025-08-18

## 2025-08-18 RX ORDER — SODIUM CHLORIDE 9 MG/ML
INJECTION, SOLUTION INTRAVENOUS CONTINUOUS PRN
Status: DISCONTINUED | OUTPATIENT
Start: 2025-08-18 | End: 2025-08-18

## 2025-08-18 RX ORDER — SODIUM CHLORIDE, SODIUM LACTATE, POTASSIUM CHLORIDE, CALCIUM CHLORIDE 600; 310; 30; 20 MG/100ML; MG/100ML; MG/100ML; MG/100ML
INJECTION, SOLUTION INTRAVENOUS CONTINUOUS PRN
Status: DISCONTINUED | OUTPATIENT
Start: 2025-08-18 | End: 2025-08-18

## 2025-08-18 RX ORDER — DIPHENHYDRAMINE HYDROCHLORIDE 50 MG/ML
25 INJECTION, SOLUTION INTRAMUSCULAR; INTRAVENOUS EVERY 6 HOURS PRN
Status: DISCONTINUED | OUTPATIENT
Start: 2025-08-18 | End: 2025-08-21 | Stop reason: HOSPADM

## 2025-08-18 RX ORDER — GLYCOPYRROLATE 0.2 MG/ML
INJECTION INTRAMUSCULAR; INTRAVENOUS AS NEEDED
Status: DISCONTINUED | OUTPATIENT
Start: 2025-08-18 | End: 2025-08-18

## 2025-08-18 RX ORDER — KETOROLAC TROMETHAMINE 30 MG/ML
INJECTION, SOLUTION INTRAMUSCULAR; INTRAVENOUS AS NEEDED
Status: DISCONTINUED | OUTPATIENT
Start: 2025-08-18 | End: 2025-08-18

## 2025-08-18 RX ORDER — LIDOCAINE HYDROCHLORIDE 10 MG/ML
INJECTION, SOLUTION EPIDURAL; INFILTRATION; INTRACAUDAL; PERINEURAL AS NEEDED
Status: DISCONTINUED | OUTPATIENT
Start: 2025-08-18 | End: 2025-08-18

## 2025-08-18 RX ORDER — SODIUM CHLORIDE, SODIUM LACTATE, POTASSIUM CHLORIDE, CALCIUM CHLORIDE 600; 310; 30; 20 MG/100ML; MG/100ML; MG/100ML; MG/100ML
20 INJECTION, SOLUTION INTRAVENOUS CONTINUOUS
Status: DISCONTINUED | OUTPATIENT
Start: 2025-08-18 | End: 2025-08-18

## 2025-08-18 RX ORDER — ACETAMINOPHEN 10 MG/ML
1000 INJECTION, SOLUTION INTRAVENOUS ONCE
Status: DISCONTINUED | OUTPATIENT
Start: 2025-08-18 | End: 2025-08-18

## 2025-08-18 RX ORDER — ACETAMINOPHEN 325 MG/1
975 TABLET ORAL EVERY 8 HOURS SCHEDULED
Status: DISCONTINUED | OUTPATIENT
Start: 2025-08-18 | End: 2025-08-21 | Stop reason: HOSPADM

## 2025-08-18 RX ORDER — PROPRANOLOL HCL 20 MG
20 TABLET ORAL EVERY 12 HOURS SCHEDULED
Status: DISCONTINUED | OUTPATIENT
Start: 2025-08-18 | End: 2025-08-21 | Stop reason: HOSPADM

## 2025-08-18 RX ORDER — FENTANYL CITRATE/PF 50 MCG/ML
25 SYRINGE (ML) INJECTION
Status: DISCONTINUED | OUTPATIENT
Start: 2025-08-18 | End: 2025-08-18 | Stop reason: HOSPADM

## 2025-08-18 RX ORDER — METRONIDAZOLE 500 MG/100ML
500 INJECTION, SOLUTION INTRAVENOUS ONCE
Status: COMPLETED | OUTPATIENT
Start: 2025-08-18 | End: 2025-08-18

## 2025-08-18 RX ORDER — INDOCYANINE GREEN AND WATER 25 MG
KIT INJECTION AS NEEDED
Status: DISCONTINUED | OUTPATIENT
Start: 2025-08-18 | End: 2025-08-18

## 2025-08-18 RX ORDER — SODIUM CHLORIDE 9 MG/ML
125 INJECTION, SOLUTION INTRAVENOUS CONTINUOUS
Status: DISCONTINUED | OUTPATIENT
Start: 2025-08-18 | End: 2025-08-19

## 2025-08-18 RX ORDER — PROPOFOL 10 MG/ML
INJECTION, EMULSION INTRAVENOUS CONTINUOUS PRN
Status: DISCONTINUED | OUTPATIENT
Start: 2025-08-18 | End: 2025-08-18

## 2025-08-18 RX ORDER — CEFAZOLIN SODIUM 2 G/50ML
2000 SOLUTION INTRAVENOUS ONCE
Status: COMPLETED | OUTPATIENT
Start: 2025-08-18 | End: 2025-08-18

## 2025-08-18 RX ORDER — INSULIN GLARGINE 100 [IU]/ML
10 INJECTION, SOLUTION SUBCUTANEOUS
Status: DISCONTINUED | OUTPATIENT
Start: 2025-08-19 | End: 2025-08-21 | Stop reason: HOSPADM

## 2025-08-18 RX ORDER — SCOPOLAMINE 1 MG/3D
1 PATCH, EXTENDED RELEASE TRANSDERMAL
Status: DISCONTINUED | OUTPATIENT
Start: 2025-08-18 | End: 2025-08-18 | Stop reason: HOSPADM

## 2025-08-18 RX ORDER — AMLODIPINE BESYLATE 5 MG/1
5 TABLET ORAL DAILY
Status: DISCONTINUED | OUTPATIENT
Start: 2025-08-19 | End: 2025-08-21 | Stop reason: HOSPADM

## 2025-08-18 RX ORDER — INSULIN LISPRO 100 [IU]/ML
2-12 INJECTION, SOLUTION INTRAVENOUS; SUBCUTANEOUS
Status: DISCONTINUED | OUTPATIENT
Start: 2025-08-19 | End: 2025-08-21 | Stop reason: HOSPADM

## 2025-08-18 RX ORDER — LATANOPROST 50 UG/ML
1 SOLUTION/ DROPS OPHTHALMIC
Status: DISCONTINUED | OUTPATIENT
Start: 2025-08-18 | End: 2025-08-21 | Stop reason: HOSPADM

## 2025-08-18 RX ORDER — ATORVASTATIN CALCIUM 10 MG/1
10 TABLET, FILM COATED ORAL
Status: DISCONTINUED | OUTPATIENT
Start: 2025-08-18 | End: 2025-08-18

## 2025-08-18 RX ORDER — METOCLOPRAMIDE HYDROCHLORIDE 5 MG/ML
INJECTION INTRAMUSCULAR; INTRAVENOUS AS NEEDED
Status: DISCONTINUED | OUTPATIENT
Start: 2025-08-18 | End: 2025-08-18

## 2025-08-18 RX ORDER — DIAZEPAM 2 MG/1
2 TABLET ORAL EVERY 6 HOURS PRN
Status: DISCONTINUED | OUTPATIENT
Start: 2025-08-18 | End: 2025-08-21 | Stop reason: HOSPADM

## 2025-08-18 RX ORDER — ONDANSETRON 2 MG/ML
INJECTION INTRAMUSCULAR; INTRAVENOUS AS NEEDED
Status: DISCONTINUED | OUTPATIENT
Start: 2025-08-18 | End: 2025-08-18

## 2025-08-18 RX ORDER — HYDROMORPHONE HCL/PF 1 MG/ML
0.5 SYRINGE (ML) INJECTION
Status: DISCONTINUED | OUTPATIENT
Start: 2025-08-18 | End: 2025-08-18 | Stop reason: HOSPADM

## 2025-08-18 RX ORDER — HYDROMORPHONE HYDROCHLORIDE 2 MG/ML
INJECTION, SOLUTION INTRAMUSCULAR; INTRAVENOUS; SUBCUTANEOUS AS NEEDED
Status: DISCONTINUED | OUTPATIENT
Start: 2025-08-18 | End: 2025-08-18

## 2025-08-18 RX ORDER — PROMETHAZINE HYDROCHLORIDE 25 MG/ML
25 INJECTION, SOLUTION INTRAMUSCULAR; INTRAVENOUS ONCE AS NEEDED
Status: DISCONTINUED | OUTPATIENT
Start: 2025-08-18 | End: 2025-08-18 | Stop reason: HOSPADM

## 2025-08-18 RX ORDER — FENTANYL CITRATE 50 UG/ML
INJECTION, SOLUTION INTRAMUSCULAR; INTRAVENOUS AS NEEDED
Status: DISCONTINUED | OUTPATIENT
Start: 2025-08-18 | End: 2025-08-18

## 2025-08-18 RX ORDER — MIDAZOLAM HYDROCHLORIDE 2 MG/2ML
INJECTION, SOLUTION INTRAMUSCULAR; INTRAVENOUS AS NEEDED
Status: DISCONTINUED | OUTPATIENT
Start: 2025-08-18 | End: 2025-08-18

## 2025-08-18 RX ORDER — MAGNESIUM HYDROXIDE 1200 MG/15ML
LIQUID ORAL AS NEEDED
Status: DISCONTINUED | OUTPATIENT
Start: 2025-08-18 | End: 2025-08-18 | Stop reason: HOSPADM

## 2025-08-18 RX ORDER — ONDANSETRON 2 MG/ML
4 INJECTION INTRAMUSCULAR; INTRAVENOUS EVERY 6 HOURS PRN
Status: DISCONTINUED | OUTPATIENT
Start: 2025-08-18 | End: 2025-08-21 | Stop reason: HOSPADM

## 2025-08-18 RX ORDER — DEXAMETHASONE SODIUM PHOSPHATE 10 MG/ML
INJECTION, SOLUTION INTRAMUSCULAR; INTRAVENOUS AS NEEDED
Status: DISCONTINUED | OUTPATIENT
Start: 2025-08-18 | End: 2025-08-18

## 2025-08-18 RX ORDER — ONDANSETRON 2 MG/ML
4 INJECTION INTRAMUSCULAR; INTRAVENOUS ONCE AS NEEDED
Status: DISCONTINUED | OUTPATIENT
Start: 2025-08-18 | End: 2025-08-18 | Stop reason: HOSPADM

## 2025-08-18 RX ORDER — HEPARIN SODIUM 5000 [USP'U]/ML
5000 INJECTION, SOLUTION INTRAVENOUS; SUBCUTANEOUS EVERY 8 HOURS SCHEDULED
Status: DISCONTINUED | OUTPATIENT
Start: 2025-08-18 | End: 2025-08-19

## 2025-08-18 RX ORDER — ROCURONIUM BROMIDE 10 MG/ML
INJECTION, SOLUTION INTRAVENOUS AS NEEDED
Status: DISCONTINUED | OUTPATIENT
Start: 2025-08-18 | End: 2025-08-18

## 2025-08-18 RX ORDER — HEPARIN SODIUM 5000 [USP'U]/ML
5000 INJECTION, SOLUTION INTRAVENOUS; SUBCUTANEOUS EVERY 8 HOURS SCHEDULED
Status: DISCONTINUED | OUTPATIENT
Start: 2025-08-18 | End: 2025-08-18

## 2025-08-18 RX ORDER — PROPOFOL 10 MG/ML
INJECTION, EMULSION INTRAVENOUS AS NEEDED
Status: DISCONTINUED | OUTPATIENT
Start: 2025-08-18 | End: 2025-08-18

## 2025-08-18 RX ORDER — INSULIN LISPRO 100 [IU]/ML
2-12 INJECTION, SOLUTION INTRAVENOUS; SUBCUTANEOUS
Status: DISCONTINUED | OUTPATIENT
Start: 2025-08-19 | End: 2025-08-18

## 2025-08-18 RX ADMIN — PHENYLEPHRINE HYDROCHLORIDE 50 MCG/MIN: 50 INJECTION INTRAVENOUS at 07:52

## 2025-08-18 RX ADMIN — HYDROMORPHONE HYDROCHLORIDE 0.5 MG: 2 INJECTION, SOLUTION INTRAMUSCULAR; INTRAVENOUS; SUBCUTANEOUS at 08:28

## 2025-08-18 RX ADMIN — GLYCOPYRROLATE 0.1 MG: 0.2 INJECTION, SOLUTION INTRAMUSCULAR; INTRAVENOUS at 08:02

## 2025-08-18 RX ADMIN — SODIUM CHLORIDE 125 ML/HR: 0.9 INJECTION, SOLUTION INTRAVENOUS at 16:26

## 2025-08-18 RX ADMIN — LATANOPROST 1 DROP: 50 SOLUTION OPHTHALMIC at 21:49

## 2025-08-18 RX ADMIN — HEPARIN SODIUM 5000 UNITS: 5000 INJECTION INTRAVENOUS; SUBCUTANEOUS at 21:52

## 2025-08-18 RX ADMIN — ONDANSETRON 4 MG: 2 INJECTION INTRAMUSCULAR; INTRAVENOUS at 11:01

## 2025-08-18 RX ADMIN — Medication: at 12:11

## 2025-08-18 RX ADMIN — FENTANYL CITRATE 50 MCG: 50 INJECTION INTRAMUSCULAR; INTRAVENOUS at 08:00

## 2025-08-18 RX ADMIN — ROCURONIUM BROMIDE 20 MG: 10 INJECTION, SOLUTION INTRAVENOUS at 08:25

## 2025-08-18 RX ADMIN — PROPOFOL 80 MCG/KG/MIN: 10 INJECTION, EMULSION INTRAVENOUS at 08:21

## 2025-08-18 RX ADMIN — METOCLOPRAMIDE 10 MG: 5 INJECTION, SOLUTION INTRAMUSCULAR; INTRAVENOUS at 11:01

## 2025-08-18 RX ADMIN — PROPOFOL 130 MG: 10 INJECTION, EMULSION INTRAVENOUS at 07:41

## 2025-08-18 RX ADMIN — ROCURONIUM BROMIDE 5 MG: 10 INJECTION, SOLUTION INTRAVENOUS at 10:47

## 2025-08-18 RX ADMIN — ROCURONIUM BROMIDE 20 MG: 10 INJECTION, SOLUTION INTRAVENOUS at 08:58

## 2025-08-18 RX ADMIN — METRONIDAZOLE: 500 SOLUTION INTRAVENOUS at 07:49

## 2025-08-18 RX ADMIN — SUGAMMADEX 200 MG: 100 INJECTION, SOLUTION INTRAVENOUS at 11:24

## 2025-08-18 RX ADMIN — PROPOFOL 30 MG: 10 INJECTION, EMULSION INTRAVENOUS at 07:42

## 2025-08-18 RX ADMIN — KETOROLAC TROMETHAMINE 15 MG: 30 INJECTION, SOLUTION INTRAMUSCULAR; INTRAVENOUS at 11:04

## 2025-08-18 RX ADMIN — GLYCOPYRROLATE 0.1 MG: 0.2 INJECTION, SOLUTION INTRAMUSCULAR; INTRAVENOUS at 08:07

## 2025-08-18 RX ADMIN — LIDOCAINE HYDROCHLORIDE 50 MG: 10 INJECTION, SOLUTION EPIDURAL; INFILTRATION; INTRACAUDAL; PERINEURAL at 07:41

## 2025-08-18 RX ADMIN — FENTANYL CITRATE 50 MCG: 50 INJECTION INTRAMUSCULAR; INTRAVENOUS at 07:41

## 2025-08-18 RX ADMIN — INDOCYANINE GREEN AND WATER 7.5 MG: KIT at 10:05

## 2025-08-18 RX ADMIN — PROPRANOLOL HYDROCHLORIDE 20 MG: 20 TABLET ORAL at 21:49

## 2025-08-18 RX ADMIN — SODIUM CHLORIDE: 0.9 INJECTION, SOLUTION INTRAVENOUS at 07:52

## 2025-08-18 RX ADMIN — DEXAMETHASONE SODIUM PHOSPHATE 10 MG: 10 INJECTION, SOLUTION INTRAMUSCULAR; INTRAVENOUS at 07:51

## 2025-08-18 RX ADMIN — PROPOFOL 20 MG: 10 INJECTION, EMULSION INTRAVENOUS at 08:28

## 2025-08-18 RX ADMIN — MIDAZOLAM 1 MG: 1 INJECTION INTRAMUSCULAR; INTRAVENOUS at 07:41

## 2025-08-18 RX ADMIN — ROCURONIUM BROMIDE 40 MG: 10 INJECTION, SOLUTION INTRAVENOUS at 07:41

## 2025-08-18 RX ADMIN — ACETAMINOPHEN 975 MG: 325 TABLET ORAL at 21:50

## 2025-08-18 RX ADMIN — HEPARIN SODIUM 5000 UNITS: 5000 INJECTION INTRAVENOUS; SUBCUTANEOUS at 07:23

## 2025-08-18 RX ADMIN — MIDAZOLAM 1 MG: 1 INJECTION INTRAMUSCULAR; INTRAVENOUS at 07:36

## 2025-08-18 RX ADMIN — CEFAZOLIN SODIUM 2000 MG: 2 SOLUTION INTRAVENOUS at 07:49

## 2025-08-18 RX ADMIN — SODIUM CHLORIDE, SODIUM LACTATE, POTASSIUM CHLORIDE, AND CALCIUM CHLORIDE: .6; .31; .03; .02 INJECTION, SOLUTION INTRAVENOUS at 07:36

## 2025-08-19 PROBLEM — Z91.89 AT RISK FOR DELIRIUM: Status: ACTIVE | Noted: 2025-08-19

## 2025-08-19 PROBLEM — R52 ACUTE PAIN: Status: ACTIVE | Noted: 2025-08-19

## 2025-08-19 PROBLEM — H91.90 HEARING LOSS: Status: ACTIVE | Noted: 2025-08-19

## 2025-08-19 PROBLEM — Z91.81 AT RISK FOR FALLS: Status: ACTIVE | Noted: 2025-08-19

## 2025-08-19 PROBLEM — Z13.31 DEPRESSION SCREENING: Status: ACTIVE | Noted: 2025-08-19

## 2025-08-19 PROBLEM — R54 FRAILTY: Status: ACTIVE | Noted: 2025-08-19

## 2025-08-19 LAB
ANION GAP SERPL CALCULATED.3IONS-SCNC: 6 MMOL/L (ref 4–13)
BASOPHILS # BLD AUTO: 0.01 THOUSANDS/ÂΜL (ref 0–0.1)
BASOPHILS NFR BLD AUTO: 0 % (ref 0–1)
BUN SERPL-MCNC: 15 MG/DL (ref 5–25)
CALCIUM SERPL-MCNC: 8.3 MG/DL (ref 8.4–10.2)
CHLORIDE SERPL-SCNC: 110 MMOL/L (ref 96–108)
CO2 SERPL-SCNC: 24 MMOL/L (ref 21–32)
CREAT SERPL-MCNC: 0.72 MG/DL (ref 0.6–1.3)
EOSINOPHIL # BLD AUTO: 0.01 THOUSAND/ÂΜL (ref 0–0.61)
EOSINOPHIL NFR BLD AUTO: 0 % (ref 0–6)
ERYTHROCYTE [DISTWIDTH] IN BLOOD BY AUTOMATED COUNT: 18.8 % (ref 11.6–15.1)
GFR SERPL CREATININE-BSD FRML MDRD: 79 ML/MIN/1.73SQ M
GLUCOSE SERPL-MCNC: 106 MG/DL (ref 65–140)
GLUCOSE SERPL-MCNC: 116 MG/DL (ref 65–140)
GLUCOSE SERPL-MCNC: 139 MG/DL (ref 65–140)
GLUCOSE SERPL-MCNC: 201 MG/DL (ref 65–140)
GLUCOSE SERPL-MCNC: 206 MG/DL (ref 65–140)
HCT VFR BLD AUTO: 28.2 % (ref 34.8–46.1)
HGB BLD-MCNC: 8.8 G/DL (ref 11.5–15.4)
IMM GRANULOCYTES # BLD AUTO: 0.02 THOUSAND/UL (ref 0–0.2)
IMM GRANULOCYTES NFR BLD AUTO: 0 % (ref 0–2)
LYMPHOCYTES # BLD AUTO: 1.29 THOUSANDS/ÂΜL (ref 0.6–4.47)
LYMPHOCYTES NFR BLD AUTO: 19 % (ref 14–44)
MAGNESIUM SERPL-MCNC: 1.3 MG/DL (ref 1.9–2.7)
MCH RBC QN AUTO: 25.4 PG (ref 26.8–34.3)
MCHC RBC AUTO-ENTMCNC: 31.2 G/DL (ref 31.4–37.4)
MCV RBC AUTO: 81 FL (ref 82–98)
MONOCYTES # BLD AUTO: 0.68 THOUSAND/ÂΜL (ref 0.17–1.22)
MONOCYTES NFR BLD AUTO: 10 % (ref 4–12)
NEUTROPHILS # BLD AUTO: 4.8 THOUSANDS/ÂΜL (ref 1.85–7.62)
NEUTS SEG NFR BLD AUTO: 71 % (ref 43–75)
NRBC BLD AUTO-RTO: 0 /100 WBCS
PHOSPHATE SERPL-MCNC: 3.7 MG/DL (ref 2.3–4.1)
PLATELET # BLD AUTO: 143 THOUSANDS/UL (ref 149–390)
PMV BLD AUTO: 9.6 FL (ref 8.9–12.7)
POTASSIUM SERPL-SCNC: 3.7 MMOL/L (ref 3.5–5.3)
RBC # BLD AUTO: 3.47 MILLION/UL (ref 3.81–5.12)
SODIUM SERPL-SCNC: 140 MMOL/L (ref 135–147)
WBC # BLD AUTO: 6.81 THOUSAND/UL (ref 4.31–10.16)

## 2025-08-19 PROCEDURE — G0316 PR PROLONG INPT EVAL ADD15 M: HCPCS | Performed by: NURSE PRACTITIONER

## 2025-08-19 PROCEDURE — 85025 COMPLETE CBC W/AUTO DIFF WBC: CPT | Performed by: PHYSICIAN ASSISTANT

## 2025-08-19 PROCEDURE — 80048 BASIC METABOLIC PNL TOTAL CA: CPT | Performed by: PHYSICIAN ASSISTANT

## 2025-08-19 PROCEDURE — 84100 ASSAY OF PHOSPHORUS: CPT | Performed by: PHYSICIAN ASSISTANT

## 2025-08-19 PROCEDURE — 97163 PT EVAL HIGH COMPLEX 45 MIN: CPT

## 2025-08-19 PROCEDURE — 99024 POSTOP FOLLOW-UP VISIT: CPT | Performed by: SURGERY

## 2025-08-19 PROCEDURE — 83735 ASSAY OF MAGNESIUM: CPT | Performed by: PHYSICIAN ASSISTANT

## 2025-08-19 PROCEDURE — 82948 REAGENT STRIP/BLOOD GLUCOSE: CPT

## 2025-08-19 PROCEDURE — 99223 1ST HOSP IP/OBS HIGH 75: CPT | Performed by: NURSE PRACTITIONER

## 2025-08-19 PROCEDURE — 97167 OT EVAL HIGH COMPLEX 60 MIN: CPT

## 2025-08-19 RX ORDER — ENOXAPARIN SODIUM 100 MG/ML
40 INJECTION SUBCUTANEOUS
Status: DISCONTINUED | OUTPATIENT
Start: 2025-08-19 | End: 2025-08-21 | Stop reason: HOSPADM

## 2025-08-19 RX ORDER — MAGNESIUM SULFATE HEPTAHYDRATE 40 MG/ML
4 INJECTION, SOLUTION INTRAVENOUS ONCE
Status: COMPLETED | OUTPATIENT
Start: 2025-08-19 | End: 2025-08-19

## 2025-08-19 RX ORDER — OXYCODONE HYDROCHLORIDE 5 MG/1
5 TABLET ORAL EVERY 4 HOURS PRN
Refills: 0 | Status: DISCONTINUED | OUTPATIENT
Start: 2025-08-19 | End: 2025-08-21 | Stop reason: HOSPADM

## 2025-08-19 RX ORDER — DEXTROSE MONOHYDRATE, SODIUM CHLORIDE, AND POTASSIUM CHLORIDE 50; 1.49; 4.5 G/1000ML; G/1000ML; G/1000ML
75 INJECTION, SOLUTION INTRAVENOUS CONTINUOUS
Status: DISCONTINUED | OUTPATIENT
Start: 2025-08-19 | End: 2025-08-20

## 2025-08-19 RX ORDER — POTASSIUM CHLORIDE 1500 MG/1
20 TABLET, EXTENDED RELEASE ORAL ONCE
Status: COMPLETED | OUTPATIENT
Start: 2025-08-19 | End: 2025-08-19

## 2025-08-19 RX ADMIN — HEPARIN SODIUM 5000 UNITS: 5000 INJECTION INTRAVENOUS; SUBCUTANEOUS at 05:32

## 2025-08-19 RX ADMIN — AMLODIPINE BESYLATE 5 MG: 5 TABLET ORAL at 09:56

## 2025-08-19 RX ADMIN — MAGNESIUM SULFATE HEPTAHYDRATE 4 G: 40 INJECTION, SOLUTION INTRAVENOUS at 09:58

## 2025-08-19 RX ADMIN — ACETAMINOPHEN 975 MG: 325 TABLET ORAL at 05:31

## 2025-08-19 RX ADMIN — INSULIN GLARGINE 10 UNITS: 100 INJECTION, SOLUTION SUBCUTANEOUS at 22:02

## 2025-08-19 RX ADMIN — DEXTROSE, SODIUM CHLORIDE, AND POTASSIUM CHLORIDE 75 ML/HR: 5; .45; .15 INJECTION INTRAVENOUS at 09:09

## 2025-08-19 RX ADMIN — PROPRANOLOL HYDROCHLORIDE 20 MG: 20 TABLET ORAL at 09:55

## 2025-08-19 RX ADMIN — ENOXAPARIN SODIUM 40 MG: 40 INJECTION SUBCUTANEOUS at 09:55

## 2025-08-19 RX ADMIN — DEXTROSE, SODIUM CHLORIDE, AND POTASSIUM CHLORIDE 75 ML/HR: 5; .45; .15 INJECTION INTRAVENOUS at 21:52

## 2025-08-19 RX ADMIN — ACETAMINOPHEN 975 MG: 325 TABLET ORAL at 21:57

## 2025-08-19 RX ADMIN — LATANOPROST 1 DROP: 50 SOLUTION OPHTHALMIC at 21:57

## 2025-08-19 RX ADMIN — POTASSIUM CHLORIDE 20 MEQ: 1500 TABLET, EXTENDED RELEASE ORAL at 09:56

## 2025-08-19 RX ADMIN — ACETAMINOPHEN 975 MG: 325 TABLET ORAL at 15:13

## 2025-08-20 LAB
ANION GAP SERPL CALCULATED.3IONS-SCNC: 4 MMOL/L (ref 4–13)
BASOPHILS # BLD AUTO: 0.01 THOUSANDS/ÂΜL (ref 0–0.1)
BASOPHILS NFR BLD AUTO: 0 % (ref 0–1)
BUN SERPL-MCNC: 12 MG/DL (ref 5–25)
CALCIUM SERPL-MCNC: 8.7 MG/DL (ref 8.4–10.2)
CHLORIDE SERPL-SCNC: 110 MMOL/L (ref 96–108)
CO2 SERPL-SCNC: 25 MMOL/L (ref 21–32)
CREAT SERPL-MCNC: 0.63 MG/DL (ref 0.6–1.3)
EOSINOPHIL # BLD AUTO: 0.07 THOUSAND/ÂΜL (ref 0–0.61)
EOSINOPHIL NFR BLD AUTO: 1 % (ref 0–6)
ERYTHROCYTE [DISTWIDTH] IN BLOOD BY AUTOMATED COUNT: 18.6 % (ref 11.6–15.1)
GFR SERPL CREATININE-BSD FRML MDRD: 85 ML/MIN/1.73SQ M
GLUCOSE SERPL-MCNC: 155 MG/DL (ref 65–140)
GLUCOSE SERPL-MCNC: 176 MG/DL (ref 65–140)
GLUCOSE SERPL-MCNC: 182 MG/DL (ref 65–140)
GLUCOSE SERPL-MCNC: 184 MG/DL (ref 65–140)
GLUCOSE SERPL-MCNC: 222 MG/DL (ref 65–140)
HCT VFR BLD AUTO: 30.6 % (ref 34.8–46.1)
HGB BLD-MCNC: 9.2 G/DL (ref 11.5–15.4)
IMM GRANULOCYTES # BLD AUTO: 0.02 THOUSAND/UL (ref 0–0.2)
IMM GRANULOCYTES NFR BLD AUTO: 0 % (ref 0–2)
LYMPHOCYTES # BLD AUTO: 1.43 THOUSANDS/ÂΜL (ref 0.6–4.47)
LYMPHOCYTES NFR BLD AUTO: 26 % (ref 14–44)
MAGNESIUM SERPL-MCNC: 1.9 MG/DL (ref 1.9–2.7)
MCH RBC QN AUTO: 25.1 PG (ref 26.8–34.3)
MCHC RBC AUTO-ENTMCNC: 30.1 G/DL (ref 31.4–37.4)
MCV RBC AUTO: 83 FL (ref 82–98)
MONOCYTES # BLD AUTO: 0.48 THOUSAND/ÂΜL (ref 0.17–1.22)
MONOCYTES NFR BLD AUTO: 9 % (ref 4–12)
NEUTROPHILS # BLD AUTO: 3.57 THOUSANDS/ÂΜL (ref 1.85–7.62)
NEUTS SEG NFR BLD AUTO: 64 % (ref 43–75)
NRBC BLD AUTO-RTO: 0 /100 WBCS
PLATELET # BLD AUTO: 158 THOUSANDS/UL (ref 149–390)
PMV BLD AUTO: 9.8 FL (ref 8.9–12.7)
POTASSIUM SERPL-SCNC: 4.1 MMOL/L (ref 3.5–5.3)
RBC # BLD AUTO: 3.67 MILLION/UL (ref 3.81–5.12)
SODIUM SERPL-SCNC: 139 MMOL/L (ref 135–147)
WBC # BLD AUTO: 5.58 THOUSAND/UL (ref 4.31–10.16)

## 2025-08-20 PROCEDURE — 83735 ASSAY OF MAGNESIUM: CPT | Performed by: PHYSICIAN ASSISTANT

## 2025-08-20 PROCEDURE — 82948 REAGENT STRIP/BLOOD GLUCOSE: CPT

## 2025-08-20 PROCEDURE — 44204 LAPARO PARTIAL COLECTOMY: CPT | Performed by: PHYSICIAN ASSISTANT

## 2025-08-20 PROCEDURE — 99024 POSTOP FOLLOW-UP VISIT: CPT | Performed by: SURGERY

## 2025-08-20 PROCEDURE — 80048 BASIC METABOLIC PNL TOTAL CA: CPT | Performed by: PHYSICIAN ASSISTANT

## 2025-08-20 PROCEDURE — 85025 COMPLETE CBC W/AUTO DIFF WBC: CPT | Performed by: PHYSICIAN ASSISTANT

## 2025-08-20 RX ORDER — HYDROMORPHONE HCL IN WATER/PF 6 MG/30 ML
0.2 PATIENT CONTROLLED ANALGESIA SYRINGE INTRAVENOUS EVERY 4 HOURS PRN
Status: DISCONTINUED | OUTPATIENT
Start: 2025-08-20 | End: 2025-08-21 | Stop reason: HOSPADM

## 2025-08-20 RX ORDER — MAGNESIUM SULFATE 1 G/100ML
1 INJECTION INTRAVENOUS ONCE
Status: COMPLETED | OUTPATIENT
Start: 2025-08-20 | End: 2025-08-20

## 2025-08-20 RX ADMIN — PROPRANOLOL HYDROCHLORIDE 20 MG: 20 TABLET ORAL at 21:13

## 2025-08-20 RX ADMIN — ENOXAPARIN SODIUM 40 MG: 40 INJECTION SUBCUTANEOUS at 09:34

## 2025-08-20 RX ADMIN — ACETAMINOPHEN 975 MG: 325 TABLET ORAL at 14:30

## 2025-08-20 RX ADMIN — INSULIN LISPRO 2 UNITS: 100 INJECTION, SOLUTION INTRAVENOUS; SUBCUTANEOUS at 07:59

## 2025-08-20 RX ADMIN — AMLODIPINE BESYLATE 5 MG: 5 TABLET ORAL at 09:34

## 2025-08-20 RX ADMIN — PROPRANOLOL HYDROCHLORIDE 20 MG: 20 TABLET ORAL at 09:35

## 2025-08-20 RX ADMIN — INSULIN GLARGINE 10 UNITS: 100 INJECTION, SOLUTION SUBCUTANEOUS at 21:14

## 2025-08-20 RX ADMIN — LATANOPROST 1 DROP: 50 SOLUTION OPHTHALMIC at 21:13

## 2025-08-20 RX ADMIN — ACETAMINOPHEN 975 MG: 325 TABLET ORAL at 05:31

## 2025-08-20 RX ADMIN — MAGNESIUM SULFATE HEPTAHYDRATE 1 G: 1 INJECTION, SOLUTION INTRAVENOUS at 09:39

## 2025-08-20 RX ADMIN — ACETAMINOPHEN 975 MG: 325 TABLET ORAL at 21:13

## 2025-08-21 VITALS
BODY MASS INDEX: 23.6 KG/M2 | OXYGEN SATURATION: 95 % | WEIGHT: 125 LBS | TEMPERATURE: 98.7 F | HEIGHT: 61 IN | SYSTOLIC BLOOD PRESSURE: 124 MMHG | RESPIRATION RATE: 17 BRPM | DIASTOLIC BLOOD PRESSURE: 49 MMHG | HEART RATE: 51 BPM

## 2025-08-21 LAB — GLUCOSE SERPL-MCNC: 142 MG/DL (ref 65–140)

## 2025-08-21 PROCEDURE — 99024 POSTOP FOLLOW-UP VISIT: CPT | Performed by: SURGERY

## 2025-08-21 PROCEDURE — 82948 REAGENT STRIP/BLOOD GLUCOSE: CPT

## 2025-08-21 PROCEDURE — NC001 PR NO CHARGE: Performed by: SURGERY

## 2025-08-21 RX ORDER — ACETAMINOPHEN 325 MG/1
975 TABLET ORAL EVERY 8 HOURS SCHEDULED
Qty: 63 TABLET | Refills: 0 | Status: SHIPPED | OUTPATIENT
Start: 2025-08-21 | End: 2025-08-28

## 2025-08-21 RX ORDER — OXYCODONE HYDROCHLORIDE 5 MG/1
2.5 TABLET ORAL EVERY 6 HOURS PRN
Qty: 8 TABLET | Refills: 0 | Status: SHIPPED | OUTPATIENT
Start: 2025-08-21 | End: 2025-08-25

## 2025-08-21 RX ORDER — ENOXAPARIN SODIUM 100 MG/ML
40 INJECTION SUBCUTANEOUS
Qty: 9.6 ML | Refills: 0 | Status: SHIPPED | OUTPATIENT
Start: 2025-08-22 | End: 2025-09-15

## 2025-08-21 RX ADMIN — PROPRANOLOL HYDROCHLORIDE 20 MG: 20 TABLET ORAL at 08:02

## 2025-08-21 RX ADMIN — ACETAMINOPHEN 975 MG: 325 TABLET ORAL at 05:37

## 2025-08-21 RX ADMIN — AMLODIPINE BESYLATE 5 MG: 5 TABLET ORAL at 08:02

## 2025-08-21 RX ADMIN — ENOXAPARIN SODIUM 40 MG: 40 INJECTION SUBCUTANEOUS at 08:02

## 2025-08-22 ENCOUNTER — HOME CARE VISIT (OUTPATIENT)
Dept: HOME HEALTH SERVICES | Facility: HOME HEALTHCARE | Age: 80
End: 2025-08-22
Attending: PHYSICIAN ASSISTANT
Payer: MEDICARE

## 2025-08-22 ENCOUNTER — TELEPHONE (OUTPATIENT)
Age: 80
End: 2025-08-22

## 2025-08-22 VITALS
SYSTOLIC BLOOD PRESSURE: 128 MMHG | BODY MASS INDEX: 25.51 KG/M2 | OXYGEN SATURATION: 98 % | DIASTOLIC BLOOD PRESSURE: 68 MMHG | WEIGHT: 135 LBS | HEART RATE: 66 BPM | RESPIRATION RATE: 18 BRPM | TEMPERATURE: 97.7 F

## 2025-08-22 DIAGNOSIS — C18.7 MALIGNANT NEOPLASM OF SIGMOID COLON (HCC): Primary | ICD-10-CM

## 2025-08-22 PROCEDURE — 400013 VN SOC

## 2025-08-22 PROCEDURE — G0299 HHS/HOSPICE OF RN EA 15 MIN: HCPCS

## (undated) DEVICE — DRAPE IOBAN 2-23 X 17 LGE

## (undated) DEVICE — Device

## (undated) DEVICE — PACK PBDS GENERAL MINOR RF

## (undated) DEVICE — PACK CUSTOM GU CYSTO PACK RF

## (undated) DEVICE — TROCAR PORT ACCESS 5 X120MML W/BLDLS OPTICAL TIP AIRSEAL

## (undated) DEVICE — STAPER EEA CIR TRISTAPLE 28MM

## (undated) DEVICE — SPONGE GAUZE 4 X 4 16 PLY STRL PLASTIC TRAY LF

## (undated) DEVICE — PACK PBDS MAJOR BASIC RF

## (undated) RX ORDER — CEPHALEXIN 500 MG/1: 1 CAPSULE ORAL

## (undated) RX ORDER — ERYTHROMYCIN 5 MG/G: 1/4 OINTMENT OPHTHALMIC TWICE A DAY